# Patient Record
Sex: FEMALE | Race: WHITE | Employment: OTHER | URBAN - METROPOLITAN AREA
[De-identification: names, ages, dates, MRNs, and addresses within clinical notes are randomized per-mention and may not be internally consistent; named-entity substitution may affect disease eponyms.]

---

## 2017-02-23 RX ORDER — TRAMADOL HYDROCHLORIDE 50 MG/1
TABLET ORAL
Qty: 180 TABLET | Refills: 1 | Status: SHIPPED | OUTPATIENT
Start: 2017-02-23 | End: 2017-02-27 | Stop reason: SDUPTHER

## 2017-02-27 RX ORDER — SIMVASTATIN 20 MG
20 TABLET ORAL NIGHTLY
Qty: 90 TABLET | Refills: 3 | Status: SHIPPED | OUTPATIENT
Start: 2017-02-27 | End: 2018-02-07 | Stop reason: SDUPTHER

## 2017-02-27 RX ORDER — TRAMADOL HYDROCHLORIDE 50 MG/1
50 TABLET ORAL 2 TIMES DAILY PRN
Qty: 180 TABLET | Refills: 1 | Status: SHIPPED | OUTPATIENT
Start: 2017-02-27 | End: 2017-03-06 | Stop reason: SDUPTHER

## 2017-03-06 RX ORDER — TRAMADOL HYDROCHLORIDE 50 MG/1
50 TABLET ORAL 2 TIMES DAILY PRN
Qty: 180 TABLET | Refills: 1 | Status: SHIPPED | OUTPATIENT
Start: 2017-03-06 | End: 2017-03-08 | Stop reason: SDUPTHER

## 2017-03-08 ENCOUNTER — OFFICE VISIT (OUTPATIENT)
Dept: INTERNAL MEDICINE CLINIC | Age: 72
End: 2017-03-08

## 2017-03-08 VITALS
HEIGHT: 62 IN | RESPIRATION RATE: 16 BRPM | DIASTOLIC BLOOD PRESSURE: 70 MMHG | OXYGEN SATURATION: 98 % | SYSTOLIC BLOOD PRESSURE: 132 MMHG | HEART RATE: 93 BPM | WEIGHT: 145 LBS | BODY MASS INDEX: 26.68 KG/M2

## 2017-03-08 DIAGNOSIS — I34.0 MODERATE MITRAL REGURGITATION: ICD-10-CM

## 2017-03-08 DIAGNOSIS — I10 ESSENTIAL HYPERTENSION: Primary | ICD-10-CM

## 2017-03-08 DIAGNOSIS — Z79.899 HIGH RISK MEDICATION USE: ICD-10-CM

## 2017-03-08 DIAGNOSIS — J45.30 MILD PERSISTENT ASTHMA WITHOUT COMPLICATION: ICD-10-CM

## 2017-03-08 DIAGNOSIS — E78.2 MIXED HYPERLIPIDEMIA: ICD-10-CM

## 2017-03-08 DIAGNOSIS — G25.81 RLS (RESTLESS LEGS SYNDROME): ICD-10-CM

## 2017-03-08 DIAGNOSIS — K21.9 GASTROESOPHAGEAL REFLUX DISEASE, ESOPHAGITIS PRESENCE NOT SPECIFIED: ICD-10-CM

## 2017-03-08 PROCEDURE — 80305 DRUG TEST PRSMV DIR OPT OBS: CPT | Performed by: INTERNAL MEDICINE

## 2017-03-08 PROCEDURE — 99213 OFFICE O/P EST LOW 20 MIN: CPT | Performed by: INTERNAL MEDICINE

## 2017-03-08 RX ORDER — TRAMADOL HYDROCHLORIDE 50 MG/1
50 TABLET ORAL 2 TIMES DAILY PRN
Qty: 180 TABLET | Refills: 1 | Status: SHIPPED | OUTPATIENT
Start: 2017-03-08 | End: 2017-07-12 | Stop reason: SDUPTHER

## 2017-03-08 ASSESSMENT — ENCOUNTER SYMPTOMS
HOARSE VOICE: 0
SPUTUM PRODUCTION: 0
HEMOPTYSIS: 0
BELCHING: 0
HEARTBURN: 0
CHEST TIGHTNESS: 0
BLURRED VISION: 0
DIFFICULTY BREATHING: 0
GLOBUS SENSATION: 0
NAUSEA: 0
ORTHOPNEA: 0
SHORTNESS OF BREATH: 1
ABDOMINAL PAIN: 0
FREQUENT THROAT CLEARING: 0
WHEEZING: 0
COUGH: 0
SORE THROAT: 0
CHOKING: 0

## 2017-03-10 ENCOUNTER — HOSPITAL ENCOUNTER (OUTPATIENT)
Dept: NON INVASIVE DIAGNOSTICS | Age: 72
Discharge: HOME | End: 2017-03-10
Attending: INTERNAL MEDICINE | Admitting: INTERNAL MEDICINE

## 2017-03-10 DIAGNOSIS — I34.0 NONRHEUMATIC MITRAL VALVE INSUFFICIENCY: ICD-10-CM

## 2017-03-10 LAB
LV EF: 60 %
LVEF MODALITY: NORMAL

## 2017-03-12 LAB
6-ACETYLMORPHINE: NOT DETECTED
7-AMINOCLONAZEPAM: NOT DETECTED
ALPHA-OH-ALPRAZOLAM: NOT DETECTED
ALPRAZOLAM: NOT DETECTED
AMPHETAMINE: NOT DETECTED
BARBITURATES: NOT DETECTED
BENZOYLECGONINE: NOT DETECTED
BUPRENORPHINE: NOT DETECTED
CARISOPRODOL: NOT DETECTED
CLONAZEPAM: NOT DETECTED
CODEINE: NOT DETECTED
CREATININE URINE: 84.5 MG/DL (ref 20–400)
DIAZEPAM: NOT DETECTED
DRUGS EXPECTED: NORMAL
EER PAIN MGT DRUG PANEL, HIGH RES/EMIT U: NORMAL
ETHYL GLUCURONIDE: NOT DETECTED
FENTANYL: NOT DETECTED
HYDROCODONE: NOT DETECTED
HYDROMORPHONE: NOT DETECTED
LORAZEPAM: NOT DETECTED
MARIJUANA METABOLITE: NOT DETECTED
MDA: NOT DETECTED
MDEA: NOT DETECTED
MDMA URINE: NOT DETECTED
MEPERIDINE: NOT DETECTED
METHADONE: NOT DETECTED
METHAMPHETAMINE: NOT DETECTED
METHYLPHENIDATE: NOT DETECTED
MIDAZOLAM: NOT DETECTED
MORPHINE: NOT DETECTED
NORBUPRENORPHINE, FREE: NOT DETECTED
NORDIAZEPAM: NOT DETECTED
NORFENTANYL: NOT DETECTED
NORHYDROCODONE, URINE: NOT DETECTED
NOROXYCODONE: NOT DETECTED
NOROXYMORPHONE, URINE: NOT DETECTED
OXAZEPAM: NOT DETECTED
OXYCODONE: NOT DETECTED
OXYMORPHONE: NOT DETECTED
PAIN MANAGEMENT DRUG PANEL: NORMAL
PAIN MANAGEMENT DRUG PANEL: NORMAL
PCP: NOT DETECTED
PHENTERMINE: NOT DETECTED
PROPOXYPHENE: NOT DETECTED
TAPENTADOL, URINE: NOT DETECTED
TAPENTADOL-O-SULFATE, URINE: NOT DETECTED
TEMAZEPAM: NOT DETECTED
TRAMADOL: PRESENT
ZOLPIDEM: NOT DETECTED

## 2017-06-22 ENCOUNTER — OFFICE VISIT (OUTPATIENT)
Dept: INTERNAL MEDICINE CLINIC | Age: 72
End: 2017-06-22

## 2017-06-22 VITALS
HEIGHT: 62 IN | OXYGEN SATURATION: 99 % | BODY MASS INDEX: 27.05 KG/M2 | SYSTOLIC BLOOD PRESSURE: 134 MMHG | RESPIRATION RATE: 16 BRPM | WEIGHT: 147 LBS | DIASTOLIC BLOOD PRESSURE: 70 MMHG | HEART RATE: 100 BPM

## 2017-06-22 DIAGNOSIS — I10 ESSENTIAL HYPERTENSION: Primary | ICD-10-CM

## 2017-06-22 DIAGNOSIS — E78.2 MIXED HYPERLIPIDEMIA: ICD-10-CM

## 2017-06-22 DIAGNOSIS — K21.9 GASTROESOPHAGEAL REFLUX DISEASE, ESOPHAGITIS PRESENCE NOT SPECIFIED: ICD-10-CM

## 2017-06-22 DIAGNOSIS — G25.81 RLS (RESTLESS LEGS SYNDROME): ICD-10-CM

## 2017-06-22 DIAGNOSIS — J45.30 MILD PERSISTENT ASTHMA WITHOUT COMPLICATION: ICD-10-CM

## 2017-06-22 PROCEDURE — 99213 OFFICE O/P EST LOW 20 MIN: CPT | Performed by: INTERNAL MEDICINE

## 2017-06-22 RX ORDER — MEPOLIZUMAB 100 MG/ML
INJECTION, POWDER, FOR SOLUTION SUBCUTANEOUS
COMMUNITY
Start: 2017-06-13 | End: 2018-04-04

## 2017-06-22 RX ORDER — PREDNISONE 1 MG/1
5 TABLET ORAL EVERY OTHER DAY
COMMUNITY
End: 2018-09-25

## 2017-06-22 ASSESSMENT — ENCOUNTER SYMPTOMS
WHEEZING: 0
FREQUENT THROAT CLEARING: 0
CHEST TIGHTNESS: 0
BLURRED VISION: 0
ORTHOPNEA: 0
GLOBUS SENSATION: 0
SPUTUM PRODUCTION: 0
HEMOPTYSIS: 0
NAUSEA: 0
COUGH: 0
SORE THROAT: 0
HOARSE VOICE: 0
HEARTBURN: 0
CHOKING: 0
ABDOMINAL PAIN: 0
DIFFICULTY BREATHING: 0
BELCHING: 0
SHORTNESS OF BREATH: 0

## 2017-06-23 ENCOUNTER — NURSE ONLY (OUTPATIENT)
Dept: INTERNAL MEDICINE CLINIC | Age: 72
End: 2017-06-23

## 2017-06-23 DIAGNOSIS — E78.2 MIXED HYPERLIPIDEMIA: ICD-10-CM

## 2017-06-23 DIAGNOSIS — I10 ESSENTIAL HYPERTENSION: ICD-10-CM

## 2017-06-23 LAB
A/G RATIO: 1.9 (ref 1.1–2.2)
ALBUMIN SERPL-MCNC: 3.8 G/DL (ref 3.4–5)
ALP BLD-CCNC: 66 U/L (ref 40–129)
ALT SERPL-CCNC: 25 U/L (ref 10–40)
ANION GAP SERPL CALCULATED.3IONS-SCNC: 16 MMOL/L (ref 3–16)
AST SERPL-CCNC: 31 U/L (ref 15–37)
BILIRUB SERPL-MCNC: <0.2 MG/DL (ref 0–1)
BUN BLDV-MCNC: 10 MG/DL (ref 7–20)
CALCIUM SERPL-MCNC: 9.1 MG/DL (ref 8.3–10.6)
CHLORIDE BLD-SCNC: 103 MMOL/L (ref 99–110)
CO2: 25 MMOL/L (ref 21–32)
CREAT SERPL-MCNC: 0.7 MG/DL (ref 0.6–1.2)
GFR AFRICAN AMERICAN: >60
GFR NON-AFRICAN AMERICAN: >60
GLOBULIN: 2 G/DL
GLUCOSE BLD-MCNC: 85 MG/DL (ref 70–99)
LDL CHOLESTEROL DIRECT: 94 MG/DL
POTASSIUM SERPL-SCNC: 4.2 MMOL/L (ref 3.5–5.1)
SODIUM BLD-SCNC: 144 MMOL/L (ref 136–145)
TOTAL PROTEIN: 5.8 G/DL (ref 6.4–8.2)

## 2017-06-23 PROCEDURE — 36415 COLL VENOUS BLD VENIPUNCTURE: CPT | Performed by: INTERNAL MEDICINE

## 2017-07-12 DIAGNOSIS — Z79.899 HIGH RISK MEDICATION USE: ICD-10-CM

## 2017-07-12 DIAGNOSIS — G25.81 RLS (RESTLESS LEGS SYNDROME): ICD-10-CM

## 2017-07-13 RX ORDER — TRAMADOL HYDROCHLORIDE 50 MG/1
TABLET ORAL
Qty: 180 TABLET | Refills: 1 | Status: SHIPPED | OUTPATIENT
Start: 2017-07-13 | End: 2017-07-31 | Stop reason: SDUPTHER

## 2017-07-31 DIAGNOSIS — G25.81 RLS (RESTLESS LEGS SYNDROME): ICD-10-CM

## 2017-07-31 DIAGNOSIS — Z79.899 HIGH RISK MEDICATION USE: ICD-10-CM

## 2017-07-31 RX ORDER — LANSOPRAZOLE 30 MG/1
CAPSULE, DELAYED RELEASE ORAL
Qty: 90 CAPSULE | Refills: 3 | Status: SHIPPED | OUTPATIENT
Start: 2017-07-31 | End: 2018-07-25 | Stop reason: SDUPTHER

## 2017-07-31 RX ORDER — LANSOPRAZOLE 30 MG/1
CAPSULE, DELAYED RELEASE ORAL
Qty: 90 CAPSULE | Refills: 3 | Status: SHIPPED | OUTPATIENT
Start: 2017-07-31 | End: 2017-07-31 | Stop reason: SDUPTHER

## 2017-07-31 RX ORDER — TRAMADOL HYDROCHLORIDE 50 MG/1
TABLET ORAL
Qty: 180 TABLET | Refills: 0 | Status: SHIPPED | OUTPATIENT
Start: 2017-07-31 | End: 2017-10-10 | Stop reason: SDUPTHER

## 2017-10-02 ENCOUNTER — OFFICE VISIT (OUTPATIENT)
Dept: INTERNAL MEDICINE CLINIC | Age: 72
End: 2017-10-02

## 2017-10-02 VITALS
HEIGHT: 62 IN | RESPIRATION RATE: 16 BRPM | OXYGEN SATURATION: 98 % | HEART RATE: 98 BPM | BODY MASS INDEX: 26.68 KG/M2 | SYSTOLIC BLOOD PRESSURE: 126 MMHG | WEIGHT: 145 LBS | DIASTOLIC BLOOD PRESSURE: 70 MMHG

## 2017-10-02 DIAGNOSIS — E78.2 MIXED HYPERLIPIDEMIA: ICD-10-CM

## 2017-10-02 DIAGNOSIS — I10 ESSENTIAL HYPERTENSION: Primary | ICD-10-CM

## 2017-10-02 DIAGNOSIS — G25.81 RLS (RESTLESS LEGS SYNDROME): ICD-10-CM

## 2017-10-02 DIAGNOSIS — K21.9 GASTROESOPHAGEAL REFLUX DISEASE, ESOPHAGITIS PRESENCE NOT SPECIFIED: ICD-10-CM

## 2017-10-02 DIAGNOSIS — J45.30 MILD PERSISTENT ASTHMA WITHOUT COMPLICATION: ICD-10-CM

## 2017-10-02 PROCEDURE — 99213 OFFICE O/P EST LOW 20 MIN: CPT | Performed by: INTERNAL MEDICINE

## 2017-10-02 ASSESSMENT — PATIENT HEALTH QUESTIONNAIRE - PHQ9
SUM OF ALL RESPONSES TO PHQ QUESTIONS 1-9: 0
2. FEELING DOWN, DEPRESSED OR HOPELESS: 0
SUM OF ALL RESPONSES TO PHQ9 QUESTIONS 1 & 2: 0
1. LITTLE INTEREST OR PLEASURE IN DOING THINGS: 0

## 2017-10-02 ASSESSMENT — ENCOUNTER SYMPTOMS
BELCHING: 0
HEMOPTYSIS: 0
NAUSEA: 0
HEARTBURN: 0
SPUTUM PRODUCTION: 0
HOARSE VOICE: 0
BLURRED VISION: 0
SHORTNESS OF BREATH: 0
FREQUENT THROAT CLEARING: 0
ABDOMINAL PAIN: 0
SORE THROAT: 0
COUGH: 0
WHEEZING: 0
GLOBUS SENSATION: 0
DIFFICULTY BREATHING: 0
CHOKING: 0
CHEST TIGHTNESS: 0
ORTHOPNEA: 0

## 2017-10-02 NOTE — PROGRESS NOTES
Subjective:    Here for recheck of htn, hld,gerd, asthma and rls. Patient ID: Manuela Henriquez is a 67 y.o. female. Hypertension   This is a chronic problem. The current episode started more than 1 year ago. The problem is unchanged. The problem is controlled. Associated symptoms include malaise/fatigue. Pertinent negatives include no blurred vision, chest pain, headaches, neck pain, orthopnea, palpitations, peripheral edema, PND, shortness of breath or sweats. (. ) There are no associated agents to hypertension. Risk factors for coronary artery disease include dyslipidemia and post-menopausal state. Past treatments include ACE inhibitors and calcium channel blockers. The current treatment provides significant improvement. There are no compliance problems. Hyperlipidemia   This is a chronic problem. The current episode started more than 1 year ago. The problem is controlled. Recent lipid tests were reviewed and are normal. There are no known factors aggravating her hyperlipidemia. Pertinent negatives include no chest pain, focal sensory loss, focal weakness, leg pain, myalgias or shortness of breath. Current antihyperlipidemic treatment includes statins. The current treatment provides significant improvement of lipids. There are no compliance problems. Risk factors for coronary artery disease include dyslipidemia, hypertension and post-menopausal.   Gastroesophageal Reflux   She reports no abdominal pain, no belching, no chest pain, no choking, no coughing, no dysphagia, no early satiety, no globus sensation, no heartburn, no hoarse voice, no nausea, no sore throat or no wheezing. This is a chronic problem. The current episode started more than 1 year ago. The problem occurs rarely. The problem has been unchanged. Nothing aggravates the symptoms. Pertinent negatives include no anemia, fatigue, melena, muscle weakness, orthopnea or weight loss. There are no known risk factors.  She has tried a PPI for the symptoms. The treatment provided significant relief. Past procedures include an EGD. Asthma   There is no chest tightness, cough, difficulty breathing, frequent throat clearing, hemoptysis, hoarse voice, shortness of breath, sputum production or wheezing. This is a chronic problem. The current episode started more than 1 year ago. The problem occurs intermittently (greatly improved on spiriva). The problem has been rapidly improving (uring albuterol several times daily. 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 Nayeli Colmenares ). The cough is productive of sputum. Associated symptoms include malaise/fatigue. Pertinent negatives include no chest pain, fever, headaches, heartburn, myalgias, orthopnea, PND, sore throat, sweats or weight loss. Associated symptoms comments: Scant sputum production. . Her symptoms are aggravated by pollen and change in weather. Her symptoms are alleviated by beta-agonist, oral steroids, steroid inhaler and ipratropium (currently on prednisone taper. ). She reports complete improvement on treatment. Her past medical history is significant for asthma and pneumonia. Past medical history comments: Recent hospitalization for pneumonia. Seeing allergist. Recovered now. .     Using tramadol for rls. Completing adls. Good relief. Using 1.5-2 tab daily. Sleeping well. Recent echo with good ef.      Pt's fatigue is at base line       Review of Systems   Constitutional: Positive for malaise/fatigue. Negative for chills, fatigue, fever and weight loss. HENT: Negative for hoarse voice and sore throat. Eyes: Negative for blurred vision. Respiratory: Negative for cough, hemoptysis, sputum production, choking, shortness of breath and wheezing. Cardiovascular: Negative for chest pain, palpitations, orthopnea, leg swelling and PND. Gastrointestinal: Negative for abdominal pain, dysphagia, heartburn, melena and nausea.    Musculoskeletal: Negative for myalgias, muscle weakness and neck pain. Neurological: Negative for focal weakness, syncope, light-headedness and headaches. Prior to Visit Medications    Medication Sig Taking? Authorizing Provider   Tiotropium Bromide Monohydrate (SPIRIVA RESPIMAT IN) Inhale into the lungs Yes Historical Provider, MD   traMADol (ULTRAM) 50 MG tablet TAKE 1 TABLET TWICE DAILY AS NEEDED  FOR  PAIN Yes Heidy Murray MD   lansoprazole (PREVACID) 30 MG delayed release capsule TAKE 1 CAPSULE EVERY DAY Yes Heidy Murray MD   NUCALA 100 MG SOLR injection  Yes Historical Provider, MD   predniSONE (DELTASONE) 5 MG tablet Take 5 mg by mouth daily Yes Historical Provider, MD   simvastatin (ZOCOR) 20 MG tablet Take 1 tablet by mouth nightly Yes Heidy Murray MD   alendronate (FOSAMAX) 70 MG tablet Take 1 tablet by mouth every 7 days Yes Heidy Murray MD   ibuprofen (ADVIL;MOTRIN) 600 MG tablet Take 1 tablet by mouth every 6 hours as needed for Pain Yes Heidy Murray MD   alclomethasone (ACLOVATE) 0.05 % cream  Yes Historical Provider, MD   amLODIPine (NORVASC) 5 MG tablet TAKE 1 TABLET EVERY DAY Yes Heidy Murray MD   cromolyn (INTAL) 20 MG/2ML nebulizer solution  Yes Historical Provider, MD   beclomethasone (QVAR) 40 MCG/ACT inhaler 1 nasal spray each side bid Yes Heidy Murray MD   ALVESCO 160 MCG/ACT AERS  Yes Historical Provider, MD   montelukast (SINGULAIR) 10 MG tablet Take 1 tablet by mouth nightly. Yes Heidy Murray MD   azelastine (ASTELIN) 137 MCG/SPRAY nasal spray 2 sprays by Nasal route daily. Use in each nostril as directed Yes Heidy Murray MD   fexofenadine (ALLEGRA) 180 MG tablet Take 1 tablet by mouth daily. Yes Heidy Murray MD   albuterol (PROVENTIL HFA;VENTOLIN HFA) 108 (90 BASE) MCG/ACT inhaler Inhale 2 puffs into the lungs. AS NEEDED. Yes Historical Provider, MD   Multiple Vitamins-Minerals (CENTRUM SILVER) TABS Take  by mouth.    Yes Historical Provider, MD   [facility] [id number] BI-FLEX ADV TRIPLE ST) TABS Take 2 tablets by mouth daily. Yes Historical Provider, MD   Calcium Carbonate-Vit D-Min (CALTRATE 600+D PLUS) 600-400 MG-UNIT TABS Take 2 tablets by mouth daily. Yes Historical Provider, MD         /70 (Site: Right Arm, Position: Sitting, Cuff Size: Medium Adult)  Pulse 98  Resp 16  Ht 5' 2\" (1.575 m)  Wt 145 lb (65.8 kg)  SpO2 98%  BMI 26.52 kg/m2    Objective:   Physical Exam   Constitutional: She appears well-developed and well-nourished. No distress. Neck: No JVD present. No thyromegaly present. No bruits   Cardiovascular: Normal rate and regular rhythm. Exam reveals no gallop and no friction rub. Murmur heard. Systolic murmur is present with a grade of 2/6   Pulmonary/Chest: Effort normal. No respiratory distress. She has no wheezes. She has no rhonchi. She has no rales. Abdominal: Soft. Bowel sounds are normal. She exhibits no distension. There is no tenderness. Musculoskeletal: Normal range of motion. She exhibits no edema. Skin: Skin is warm and dry. She is not diaphoretic. Vitals reviewed. Assessment:      1. Essential hypertension    2. Mixed hyperlipidemia    3. Gastroesophageal reflux disease, esophagitis presence not specified    4. Mild persistent asthma without complication    5. RLS (restless legs syndrome)              Plan:          Yvonne was seen today for hypertension. Diagnoses and all orders for this visit:    Essential hypertension:  Stable, cont same meds    Mixed hyperlipidemia:  Stable, cont same meds    Gastroesophageal reflux disease, esophagitis presence not specified:  Stable, cont same meds    Mild persistent asthma without complication:  Stable, cont same meds    RLS (restless legs syndrome): Stable, cont same meds        Controlled Substances Monitoring: Documentation: No signs of potential drug abuse or diversion identified., Obtaining appropriate analgesic effect of treatment.  (Christy Fong MD)      3

## 2017-10-02 NOTE — MR AVS SNAPSHOT
your BMI by decreasing your calorie intake and becoming more physically active. Learn more at: [email or web address]             Today's Medication Changes          These changes are accurate as of: 10/2/17 12:47 PM.  If you have any questions, ask your nurse or doctor. STOP taking these medications           TUDORZA PRESSAIR 400 MCG/ACT Aepb inhaler   Generic drug:  aclidinium   Stopped by:  Cee Power MD               Your Current Medications Are              Tiotropium Bromide Monohydrate (SPIRIVA RESPIMAT IN) Inhale into the lungs    traMADol (ULTRAM) 50 MG tablet TAKE 1 TABLET TWICE DAILY AS NEEDED  FOR  PAIN    lansoprazole (PREVACID) 30 MG delayed release capsule TAKE 1 CAPSULE EVERY DAY    NUCALA 100 MG SOLR injection     predniSONE (DELTASONE) 5 MG tablet Take 5 mg by mouth daily    simvastatin (ZOCOR) 20 MG tablet Take 1 tablet by mouth nightly    alendronate (FOSAMAX) 70 MG tablet Take 1 tablet by mouth every 7 days    ibuprofen (ADVIL;MOTRIN) 600 MG tablet Take 1 tablet by mouth every 6 hours as needed for Pain    alclomethasone (ACLOVATE) 0.05 % cream     amLODIPine (NORVASC) 5 MG tablet TAKE 1 TABLET EVERY DAY    cromolyn (INTAL) 20 MG/2ML nebulizer solution     beclomethasone (QVAR) 40 MCG/ACT inhaler 1 nasal spray each side bid    ALVESCO 160 MCG/ACT AERS     montelukast (SINGULAIR) 10 MG tablet Take 1 tablet by mouth nightly. azelastine (ASTELIN) 137 MCG/SPRAY nasal spray 2 sprays by Nasal route daily. Use in each nostril as directed    fexofenadine (ALLEGRA) 180 MG tablet Take 1 tablet by mouth daily. albuterol (PROVENTIL HFA;VENTOLIN HFA) 108 (90 BASE) MCG/ACT inhaler Inhale 2 puffs into the lungs. AS NEEDED. Multiple Vitamins-Minerals (CENTRUM SILVER) TABS Take  by mouth. Misc Natural Products (OSTEO BI-FLEX ADV TRIPLE ST) TABS Take 2 tablets by mouth daily. Calcium Carbonate-Vit D-Min (CALTRATE 600+D PLUS) 600-400 MG-UNIT TABS Take 2 tablets by mouth daily. Allergies              Cinnamon     Eggs [Egg White]     Mustard [Allyl Isothiocyanate]     Naprosyn [Naproxen]     Other     TREE NUTS, PEANUTS    Pcn [Penicillins]     Red Dye     rash    Tylenol [Acetaminophen]          Additional Information        Basic Information     Date Of Birth Sex Race Ethnicity Preferred Language    1945 Female White Non-/Non  English      Problem List as of 10/2/2017  Date Reviewed: 10/2/2017                Moderate mitral regurgitation    Mild tricuspid regurgitation    Hyperlipidemia    Essential hypertension    Gastroesophageal reflux disease    Mild persistent asthma without complication    Seasonal allergies    Arthritis    Encounter for long-term (current) use of medications    Abdominal pain    Allergic rhinitis    RLS (restless legs syndrome)    Osteopenia      Your Goals as of 10/2/2017 at 12:47 PM                 Lifestyle    Will take medications as prescribed by Md 8/29/16     Notes    Self- Management Goals for the Patient with Hypertension: It is important to have goals to work towards when you have Hypertension. Below is a list of goals your doctor would like you to work towards to help control your hypertension and also maintain and improve your overall health. Please select one of these goals to try before your next follow up visit:    Goal: I will take all medications as prescribed by my doctor, and I will call the office if I am having any medication problems. Guess your barriers before they happen. Everyone runs into barriers to their goals. You may already know what's going to get in your way. Write down these problems (cost? time? stress? fear?), and think of ways to get around them.      Barriers to success: none  Plan for overcoming my barriers: N/A     Confidence: 10/10  Date goal set: 8/29/16  Date goal attained: Immunizations as of 10/2/2017     Name Date    Pneumococcal 13-valent Conjugate ([id number]) 3/2/2015    Pneumococcal Conjugate 7-valent 10/20/2005    Pneumococcal Polysaccharide ([id number]) 5/26/2016    Zoster 10/10/2012      Preventive Care        Date Due    Hepatitis C screening is recommended for all adults regardless of risk factors born [facility] at least once (lifetime) who have never been tested. 1945    Tetanus Combination Vaccine (1 - Tdap) 7/4/1964    Yearly Flu Vaccine (1) 9/1/2017    Mammograms are recommended every 2 years for low/average risk patients aged 48 - 69, and every year for high risk patients per updated national guidelines. However these guidelines can be individualized by your provider. 3/11/2018    Cholesterol Screening 6/23/2022    Colonoscopy 6/13/2023            aCont Signup           Our records indicate that you have an active [employer] account. You can view your After Visit Summary by going to [email or web address] and logging in with your [employer] username and password. If you don't have a [employer] username and password but a parent or guardian has access to your record, the parent or guardian should login with their own [employer] username and password and access your record to view the After Visit Summary. Additional Information  If you have questions, please contact the physician practice where you receive care. Remember, [employer] is NOT to be used for urgent needs. For medical emergencies, dial 911. For questions regarding your [employer] account call [phone]. If you have a clinical question, please call your doctor's office.

## 2017-10-10 DIAGNOSIS — G25.81 RLS (RESTLESS LEGS SYNDROME): ICD-10-CM

## 2017-10-10 DIAGNOSIS — Z79.899 HIGH RISK MEDICATION USE: ICD-10-CM

## 2017-10-10 RX ORDER — TRAMADOL HYDROCHLORIDE 50 MG/1
TABLET ORAL
Qty: 180 TABLET | Refills: 1 | OUTPATIENT
Start: 2017-10-10 | End: 2018-02-27 | Stop reason: SDUPTHER

## 2017-12-04 RX ORDER — AMLODIPINE BESYLATE 5 MG/1
TABLET ORAL
Qty: 90 TABLET | Refills: 3 | Status: SHIPPED | OUTPATIENT
Start: 2017-12-04 | End: 2018-04-04 | Stop reason: SDUPTHER

## 2018-01-03 ENCOUNTER — OFFICE VISIT (OUTPATIENT)
Dept: INTERNAL MEDICINE CLINIC | Age: 73
End: 2018-01-03

## 2018-01-03 VITALS
BODY MASS INDEX: 25.58 KG/M2 | WEIGHT: 139 LBS | RESPIRATION RATE: 16 BRPM | SYSTOLIC BLOOD PRESSURE: 134 MMHG | DIASTOLIC BLOOD PRESSURE: 68 MMHG | HEIGHT: 62 IN | HEART RATE: 78 BPM | OXYGEN SATURATION: 99 %

## 2018-01-03 DIAGNOSIS — E78.2 MIXED HYPERLIPIDEMIA: ICD-10-CM

## 2018-01-03 DIAGNOSIS — K21.9 GASTROESOPHAGEAL REFLUX DISEASE, ESOPHAGITIS PRESENCE NOT SPECIFIED: ICD-10-CM

## 2018-01-03 DIAGNOSIS — G25.81 RLS (RESTLESS LEGS SYNDROME): ICD-10-CM

## 2018-01-03 DIAGNOSIS — J45.30 MILD PERSISTENT ASTHMA WITHOUT COMPLICATION: ICD-10-CM

## 2018-01-03 DIAGNOSIS — I34.0 MODERATE MITRAL REGURGITATION: ICD-10-CM

## 2018-01-03 DIAGNOSIS — R47.1 DYSARTHRIA: ICD-10-CM

## 2018-01-03 DIAGNOSIS — I10 ESSENTIAL HYPERTENSION: Primary | ICD-10-CM

## 2018-01-03 LAB
A/G RATIO: 2 (ref 1.1–2.2)
ALBUMIN SERPL-MCNC: 4.2 G/DL (ref 3.4–5)
ALP BLD-CCNC: 55 U/L (ref 40–129)
ALT SERPL-CCNC: 29 U/L (ref 10–40)
ANION GAP SERPL CALCULATED.3IONS-SCNC: 14 MMOL/L (ref 3–16)
AST SERPL-CCNC: 24 U/L (ref 15–37)
BILIRUB SERPL-MCNC: 0.4 MG/DL (ref 0–1)
BUN BLDV-MCNC: 10 MG/DL (ref 7–20)
CALCIUM SERPL-MCNC: 9.7 MG/DL (ref 8.3–10.6)
CHLORIDE BLD-SCNC: 105 MMOL/L (ref 99–110)
CHOLESTEROL, TOTAL: 182 MG/DL (ref 0–199)
CO2: 27 MMOL/L (ref 21–32)
CREAT SERPL-MCNC: 0.5 MG/DL (ref 0.6–1.2)
GFR AFRICAN AMERICAN: >60
GFR NON-AFRICAN AMERICAN: >60
GLOBULIN: 2.1 G/DL
GLUCOSE BLD-MCNC: 81 MG/DL (ref 70–99)
HDLC SERPL-MCNC: 48 MG/DL (ref 40–60)
LDL CHOLESTEROL CALCULATED: 83 MG/DL
POTASSIUM SERPL-SCNC: 4.4 MMOL/L (ref 3.5–5.1)
SODIUM BLD-SCNC: 146 MMOL/L (ref 136–145)
TOTAL PROTEIN: 6.3 G/DL (ref 6.4–8.2)
TRIGL SERPL-MCNC: 256 MG/DL (ref 0–150)
VLDLC SERPL CALC-MCNC: 51 MG/DL

## 2018-01-03 PROCEDURE — G8419 CALC BMI OUT NRM PARAM NOF/U: HCPCS | Performed by: INTERNAL MEDICINE

## 2018-01-03 PROCEDURE — 4040F PNEUMOC VAC/ADMIN/RCVD: CPT | Performed by: INTERNAL MEDICINE

## 2018-01-03 PROCEDURE — 99214 OFFICE O/P EST MOD 30 MIN: CPT | Performed by: INTERNAL MEDICINE

## 2018-01-03 PROCEDURE — G8427 DOCREV CUR MEDS BY ELIG CLIN: HCPCS | Performed by: INTERNAL MEDICINE

## 2018-01-03 PROCEDURE — G8484 FLU IMMUNIZE NO ADMIN: HCPCS | Performed by: INTERNAL MEDICINE

## 2018-01-03 PROCEDURE — 3017F COLORECTAL CA SCREEN DOC REV: CPT | Performed by: INTERNAL MEDICINE

## 2018-01-03 PROCEDURE — 36415 COLL VENOUS BLD VENIPUNCTURE: CPT | Performed by: INTERNAL MEDICINE

## 2018-01-03 PROCEDURE — 3014F SCREEN MAMMO DOC REV: CPT | Performed by: INTERNAL MEDICINE

## 2018-01-03 PROCEDURE — 1090F PRES/ABSN URINE INCON ASSESS: CPT | Performed by: INTERNAL MEDICINE

## 2018-01-03 PROCEDURE — 1036F TOBACCO NON-USER: CPT | Performed by: INTERNAL MEDICINE

## 2018-01-03 PROCEDURE — 1123F ACP DISCUSS/DSCN MKR DOCD: CPT | Performed by: INTERNAL MEDICINE

## 2018-01-03 PROCEDURE — G8399 PT W/DXA RESULTS DOCUMENT: HCPCS | Performed by: INTERNAL MEDICINE

## 2018-01-03 ASSESSMENT — ENCOUNTER SYMPTOMS
CHOKING: 0
SPUTUM PRODUCTION: 0
WHEEZING: 0
FREQUENT THROAT CLEARING: 0
DIFFICULTY BREATHING: 0
HOARSE VOICE: 0
GLOBUS SENSATION: 0
SHORTNESS OF BREATH: 0
CHEST TIGHTNESS: 0
NAUSEA: 0
ABDOMINAL PAIN: 0
BLURRED VISION: 0
HEMOPTYSIS: 0
COUGH: 0
SORE THROAT: 0
BELCHING: 0
HEARTBURN: 0
ORTHOPNEA: 0

## 2018-01-03 NOTE — PROGRESS NOTES
orthopnea, leg swelling and PND. Gastrointestinal: Negative for abdominal pain, dysphagia, heartburn, melena and nausea. Musculoskeletal: Negative for myalgias, muscle weakness and neck pain. Neurological: Positive for speech difficulty. Negative for focal weakness, syncope, light-headedness and headaches. Prior to Visit Medications    Medication Sig Taking? Authorizing Provider   amLODIPine (NORVASC) 5 MG tablet TAKE 1 TABLET EVERY DAY Yes Burton Dalal MD   traMADol (ULTRAM) 50 MG tablet TAKE 1 TABLET TWICE DAILY AS NEEDED  FOR  PAIN Yes Burton Dalal MD   Tiotropium Bromide Monohydrate (SPIRIVA RESPIMAT IN) Inhale into the lungs Yes Historical Provider, MD   lansoprazole (PREVACID) 30 MG delayed release capsule TAKE 1 CAPSULE EVERY DAY Yes Burton Dalal MD   NUCALA 100 MG SOLR injection  Yes Historical Provider, MD   predniSONE (DELTASONE) 5 MG tablet Take 5 mg by mouth every other day  Yes Historical Provider, MD   simvastatin (ZOCOR) 20 MG tablet Take 1 tablet by mouth nightly Yes Burton Dalal MD   alendronate (FOSAMAX) 70 MG tablet Take 1 tablet by mouth every 7 days Yes Burton Dalal MD   ibuprofen (ADVIL;MOTRIN) 600 MG tablet Take 1 tablet by mouth every 6 hours as needed for Pain Yes Burton Dalal MD   alclomethasone (ACLOVATE) 0.05 % cream  Yes Historical Provider, MD   cromolyn (INTAL) 20 MG/2ML nebulizer solution  Yes Historical Provider, MD   beclomethasone (QVAR) 40 MCG/ACT inhaler 1 nasal spray each side bid Yes Burton aDlal MD   ALVESCO 160 MCG/ACT AERS  Yes Historical Provider, MD   montelukast (SINGULAIR) 10 MG tablet Take 1 tablet by mouth nightly. Yes Burton Dalal MD   azelastine (ASTELIN) 137 MCG/SPRAY nasal spray 2 sprays by Nasal route daily. Use in each nostril as directed Yes Burton Dalal MD   fexofenadine (ALLEGRA) 180 MG tablet Take 1 tablet by mouth daily.  Yes Burton Dalal MD   albuterol (PROVENTIL HFA;VENTOLIN HFA) 108 (90 BASE) MCG/ACT inhaler Inhale 2 puffs into the lungs. AS NEEDED. Yes Historical Provider, MD   Multiple Vitamins-Minerals (CENTRUM SILVER) TABS Take  by mouth. Yes Historical Provider, MD   Misc Natural Products (OSTEO BI-FLEX ADV TRIPLE ST) TABS Take 2 tablets by mouth daily. Yes Historical Provider, MD   Calcium Carbonate-Vit D-Min (CALTRATE 600+D PLUS) 600-400 MG-UNIT TABS Take 2 tablets by mouth daily. Yes Historical Provider, MD         /68 (Site: Left Arm, Position: Sitting, Cuff Size: Medium Adult)   Pulse 78   Resp 16   Ht 5' 2\" (1.575 m)   Wt 139 lb (63 kg)   SpO2 99%   BMI 25.42 kg/m²     Objective:   Physical Exam   Constitutional: She is oriented to person, place, and time. She appears well-developed and well-nourished. No distress. Neck: No JVD present. No thyromegaly present. + soft left bruit   Cardiovascular: Normal rate and regular rhythm. Exam reveals no gallop and no friction rub. Murmur heard. Systolic murmur is present with a grade of 2/6   Pulmonary/Chest: Effort normal. No respiratory distress. She has no wheezes. She has no rhonchi. She has no rales. Abdominal: Soft. Bowel sounds are normal. She exhibits no distension. There is no tenderness. Musculoskeletal: Normal range of motion. She exhibits no edema. Neurological: She is alert and oriented to person, place, and time. She has normal reflexes. No cranial nerve deficit. Skin: Skin is warm and dry. She is not diaphoretic. Vitals reviewed. Assessment:      1. Essential hypertension    2. Mixed hyperlipidemia    3. Gastroesophageal reflux disease, esophagitis presence not specified    4. Mild persistent asthma without complication    5. RLS (restless legs syndrome)    6. Dysarthria    7. Moderate mitral regurgitation              Plan:          Yvonne was seen today for hypertension.     Diagnoses and all orders for this visit:    Essential hypertension:  Stable, cont same meds  - Comprehensive Metabolic Panel; Future    Mixed hyperlipidemia:  Stable, cont same meds  -     Comprehensive Metabolic Panel; Future  -     Lipid Panel; Future    Gastroesophageal reflux disease, esophagitis presence not specified:  Stable, cont same meds  -     Comprehensive Metabolic Panel; Future    Mild persistent asthma without complication:  Stable, cont same meds    RLS (restless legs syndrome): Stable, cont same meds    Dysarthria:  Check echo and carotids, cont asa  -     Ultrasound carotid doppler; Future  -     ECHO Complete 2D W Doppler W Color; Future    Moderate mitral regurgitation  -     ECHO Complete 2D W Doppler W Color; Future      Controlled Substances Monitoring:     Attestation: The Prescription Monitoring Report for this patient was reviewed today. (Joel Ibarra MD)  Documentation: No signs of potential drug abuse or diversion identified. , Random urine drug screen sent today., Obtaining appropriate analgesic effect of treatment. (Joel Ibarra MD)  Chronic Pain: Dose reduction has been attempted., Functional status reviewed - continues with improved or maintaining ADL's. (Joel Ibarra MD)  Medication Contracts: Medication contract signed today.  (Joel Ibarra MD)      3 months

## 2018-01-07 LAB
6-ACETYLMORPHINE: NOT DETECTED
7-AMINOCLONAZEPAM: NOT DETECTED
ALPHA-OH-ALPRAZOLAM: NOT DETECTED
ALPRAZOLAM: NOT DETECTED
AMPHETAMINE: NOT DETECTED
BARBITURATES: NOT DETECTED
BENZOYLECGONINE: NOT DETECTED
BUPRENORPHINE: NOT DETECTED
CARISOPRODOL: NOT DETECTED
CLONAZEPAM: NOT DETECTED
CODEINE: NOT DETECTED
CREATININE URINE: 48.3 MG/DL (ref 20–400)
DIAZEPAM: NOT DETECTED
DRUGS EXPECTED: NORMAL
EER PAIN MGT DRUG PANEL, HIGH RES/EMIT U: NORMAL
ETHYL GLUCURONIDE: NOT DETECTED
FENTANYL: NOT DETECTED
HYDROCODONE: NOT DETECTED
HYDROMORPHONE: NOT DETECTED
LORAZEPAM: NOT DETECTED
MARIJUANA METABOLITE: NOT DETECTED
MDA: NOT DETECTED
MDEA: NOT DETECTED
MDMA URINE: NOT DETECTED
MEPERIDINE: NOT DETECTED
METHADONE: NOT DETECTED
METHAMPHETAMINE: NOT DETECTED
METHYLPHENIDATE: NOT DETECTED
MIDAZOLAM: NOT DETECTED
MORPHINE: NOT DETECTED
NORBUPRENORPHINE, FREE: NOT DETECTED
NORDIAZEPAM: NOT DETECTED
NORFENTANYL: NOT DETECTED
NORHYDROCODONE, URINE: NOT DETECTED
NOROXYCODONE: NOT DETECTED
NOROXYMORPHONE, URINE: NOT DETECTED
OXAZEPAM: NOT DETECTED
OXYCODONE: NOT DETECTED
OXYMORPHONE: NOT DETECTED
PAIN MANAGEMENT DRUG PANEL: NORMAL
PAIN MANAGEMENT DRUG PANEL: NORMAL
PCP: NOT DETECTED
PHENTERMINE: NOT DETECTED
PROPOXYPHENE: NOT DETECTED
TAPENTADOL, URINE: NOT DETECTED
TAPENTADOL-O-SULFATE, URINE: NOT DETECTED
TEMAZEPAM: NOT DETECTED
TRAMADOL: PRESENT
ZOLPIDEM: NOT DETECTED

## 2018-01-12 ENCOUNTER — HOSPITAL ENCOUNTER (OUTPATIENT)
Dept: NON INVASIVE DIAGNOSTICS | Age: 73
Discharge: HOME | End: 2018-01-12
Attending: INTERNAL MEDICINE | Admitting: INTERNAL MEDICINE

## 2018-01-12 DIAGNOSIS — R47.1 DYSARTHRIA: ICD-10-CM

## 2018-01-12 DIAGNOSIS — R47.1 DYSARTHRIA AND ANARTHRIA: ICD-10-CM

## 2018-01-12 DIAGNOSIS — I34.0 MODERATE MITRAL REGURGITATION: ICD-10-CM

## 2018-01-12 LAB
LV EF: 70 %
LVEF MODALITY: NORMAL

## 2018-01-29 DIAGNOSIS — M25.552 LEFT HIP PAIN: ICD-10-CM

## 2018-01-29 RX ORDER — IBUPROFEN 600 MG/1
TABLET ORAL
Qty: 120 TABLET | Refills: 5 | Status: SHIPPED | OUTPATIENT
Start: 2018-01-29 | End: 2019-02-22 | Stop reason: SDUPTHER

## 2018-01-29 RX ORDER — ALENDRONATE SODIUM 70 MG/1
TABLET ORAL
Qty: 12 TABLET | Refills: 3 | Status: SHIPPED | OUTPATIENT
Start: 2018-01-29 | End: 2019-01-27 | Stop reason: SDUPTHER

## 2018-02-07 RX ORDER — SIMVASTATIN 20 MG
TABLET ORAL
Qty: 90 TABLET | Refills: 3 | Status: SHIPPED | OUTPATIENT
Start: 2018-02-07 | End: 2019-02-13 | Stop reason: SDUPTHER

## 2018-03-07 DIAGNOSIS — G25.81 RLS (RESTLESS LEGS SYNDROME): ICD-10-CM

## 2018-03-07 DIAGNOSIS — Z79.899 HIGH RISK MEDICATION USE: ICD-10-CM

## 2018-03-08 RX ORDER — TRAMADOL HYDROCHLORIDE 50 MG/1
TABLET ORAL
Qty: 180 TABLET | Refills: 0 | Status: SHIPPED | OUTPATIENT
Start: 2018-03-08 | End: 2018-03-15 | Stop reason: SDUPTHER

## 2018-03-08 NOTE — TELEPHONE ENCOUNTER
Controlled Substances Monitoring: The Prescription Monitoring Report for this patient was reviewed today. (Burton Dalal MD)    No signs of potential drug abuse or diversion identified. (Burton Dalal MD)         Functional status reviewed - continues with improved or maintaining ADL's. (Burton Dalal MD)    Medication contract signed today.  (Burton Dalal MD)

## 2018-04-04 ENCOUNTER — OFFICE VISIT (OUTPATIENT)
Dept: INTERNAL MEDICINE CLINIC | Age: 73
End: 2018-04-04

## 2018-04-04 VITALS
WEIGHT: 141 LBS | BODY MASS INDEX: 25.95 KG/M2 | DIASTOLIC BLOOD PRESSURE: 60 MMHG | HEART RATE: 96 BPM | TEMPERATURE: 97.8 F | SYSTOLIC BLOOD PRESSURE: 122 MMHG | HEIGHT: 62 IN | RESPIRATION RATE: 12 BRPM | OXYGEN SATURATION: 97 %

## 2018-04-04 DIAGNOSIS — E78.2 MIXED HYPERLIPIDEMIA: ICD-10-CM

## 2018-04-04 DIAGNOSIS — G25.81 RLS (RESTLESS LEGS SYNDROME): ICD-10-CM

## 2018-04-04 DIAGNOSIS — I10 ESSENTIAL HYPERTENSION: Primary | ICD-10-CM

## 2018-04-04 DIAGNOSIS — K21.9 GASTROESOPHAGEAL REFLUX DISEASE, ESOPHAGITIS PRESENCE NOT SPECIFIED: ICD-10-CM

## 2018-04-04 DIAGNOSIS — J45.30 MILD PERSISTENT ASTHMA WITHOUT COMPLICATION: ICD-10-CM

## 2018-04-04 DIAGNOSIS — I34.0 MODERATE MITRAL REGURGITATION: ICD-10-CM

## 2018-04-04 PROCEDURE — 3014F SCREEN MAMMO DOC REV: CPT | Performed by: INTERNAL MEDICINE

## 2018-04-04 PROCEDURE — G8419 CALC BMI OUT NRM PARAM NOF/U: HCPCS | Performed by: INTERNAL MEDICINE

## 2018-04-04 PROCEDURE — 3017F COLORECTAL CA SCREEN DOC REV: CPT | Performed by: INTERNAL MEDICINE

## 2018-04-04 PROCEDURE — 4040F PNEUMOC VAC/ADMIN/RCVD: CPT | Performed by: INTERNAL MEDICINE

## 2018-04-04 PROCEDURE — 1123F ACP DISCUSS/DSCN MKR DOCD: CPT | Performed by: INTERNAL MEDICINE

## 2018-04-04 PROCEDURE — G8427 DOCREV CUR MEDS BY ELIG CLIN: HCPCS | Performed by: INTERNAL MEDICINE

## 2018-04-04 PROCEDURE — 1090F PRES/ABSN URINE INCON ASSESS: CPT | Performed by: INTERNAL MEDICINE

## 2018-04-04 PROCEDURE — 1036F TOBACCO NON-USER: CPT | Performed by: INTERNAL MEDICINE

## 2018-04-04 PROCEDURE — 99213 OFFICE O/P EST LOW 20 MIN: CPT | Performed by: INTERNAL MEDICINE

## 2018-04-04 PROCEDURE — G8399 PT W/DXA RESULTS DOCUMENT: HCPCS | Performed by: INTERNAL MEDICINE

## 2018-04-04 RX ORDER — DESOXIMETASONE 2.5 MG/G
CREAM TOPICAL
Qty: 30 G | Refills: 0 | COMMUNITY
Start: 2018-04-04 | End: 2022-07-29 | Stop reason: SDUPTHER

## 2018-04-04 RX ORDER — AMLODIPINE BESYLATE 5 MG/1
2.5 TABLET ORAL DAILY
Qty: 90 TABLET | Refills: 3
Start: 2018-04-04 | End: 2019-07-24

## 2018-04-04 ASSESSMENT — ENCOUNTER SYMPTOMS
CHOKING: 0
FREQUENT THROAT CLEARING: 0
BELCHING: 0
SORE THROAT: 0
HEARTBURN: 0
SPUTUM PRODUCTION: 0
GLOBUS SENSATION: 0
WHEEZING: 0
HOARSE VOICE: 0
NAUSEA: 0
SHORTNESS OF BREATH: 0
DIFFICULTY BREATHING: 0
HEMOPTYSIS: 0
ORTHOPNEA: 0
BLURRED VISION: 0
ABDOMINAL PAIN: 0
CHEST TIGHTNESS: 0
COUGH: 0

## 2018-04-08 LAB
6-ACETYLMORPHINE: NOT DETECTED
7-AMINOCLONAZEPAM: NOT DETECTED
ALPHA-OH-ALPRAZOLAM: NOT DETECTED
ALPRAZOLAM: NOT DETECTED
AMPHETAMINE: NOT DETECTED
BARBITURATES: NOT DETECTED
BENZOYLECGONINE: NOT DETECTED
BUPRENORPHINE: NOT DETECTED
CARISOPRODOL: NOT DETECTED
CLONAZEPAM: NOT DETECTED
CODEINE: NOT DETECTED
CREATININE URINE: 113.1 MG/DL (ref 20–400)
DIAZEPAM: NOT DETECTED
DRUGS EXPECTED: NORMAL
EER PAIN MGT DRUG PANEL, HIGH RES/EMIT U: NORMAL
ETHYL GLUCURONIDE: NOT DETECTED
FENTANYL: NOT DETECTED
HYDROCODONE: NOT DETECTED
HYDROMORPHONE: NOT DETECTED
LORAZEPAM: NOT DETECTED
MARIJUANA METABOLITE: NOT DETECTED
MDA: NOT DETECTED
MDEA: NOT DETECTED
MDMA URINE: NOT DETECTED
MEPERIDINE: NOT DETECTED
METHADONE: NOT DETECTED
METHAMPHETAMINE: NOT DETECTED
METHYLPHENIDATE: NOT DETECTED
MIDAZOLAM: NOT DETECTED
MORPHINE: NOT DETECTED
NORBUPRENORPHINE, FREE: NOT DETECTED
NORDIAZEPAM: NOT DETECTED
NORFENTANYL: NOT DETECTED
NORHYDROCODONE, URINE: NOT DETECTED
NOROXYCODONE: NOT DETECTED
NOROXYMORPHONE, URINE: NOT DETECTED
OXAZEPAM: NOT DETECTED
OXYCODONE: NOT DETECTED
OXYMORPHONE: NOT DETECTED
PAIN MANAGEMENT DRUG PANEL: NORMAL
PAIN MANAGEMENT DRUG PANEL: NORMAL
PCP: NOT DETECTED
PHENTERMINE: NOT DETECTED
PROPOXYPHENE: NOT DETECTED
TAPENTADOL, URINE: NOT DETECTED
TAPENTADOL-O-SULFATE, URINE: NOT DETECTED
TEMAZEPAM: NOT DETECTED
TRAMADOL: PRESENT
ZOLPIDEM: NOT DETECTED

## 2018-06-21 DIAGNOSIS — G25.81 RLS (RESTLESS LEGS SYNDROME): Primary | ICD-10-CM

## 2018-06-22 RX ORDER — TRAMADOL HYDROCHLORIDE 50 MG/1
TABLET ORAL
Qty: 60 TABLET | Refills: 0 | Status: SHIPPED | OUTPATIENT
Start: 2018-06-22 | End: 2018-09-05 | Stop reason: SDUPTHER

## 2018-07-05 ENCOUNTER — OFFICE VISIT (OUTPATIENT)
Dept: INTERNAL MEDICINE CLINIC | Age: 73
End: 2018-07-05

## 2018-07-05 VITALS
WEIGHT: 135 LBS | BODY MASS INDEX: 24.84 KG/M2 | HEIGHT: 62 IN | HEART RATE: 68 BPM | DIASTOLIC BLOOD PRESSURE: 78 MMHG | RESPIRATION RATE: 16 BRPM | SYSTOLIC BLOOD PRESSURE: 116 MMHG

## 2018-07-05 DIAGNOSIS — J45.30 MILD PERSISTENT ASTHMA WITHOUT COMPLICATION: ICD-10-CM

## 2018-07-05 DIAGNOSIS — K21.9 GASTROESOPHAGEAL REFLUX DISEASE, ESOPHAGITIS PRESENCE NOT SPECIFIED: ICD-10-CM

## 2018-07-05 DIAGNOSIS — E78.2 MIXED HYPERLIPIDEMIA: ICD-10-CM

## 2018-07-05 DIAGNOSIS — R00.2 PALPITATIONS: ICD-10-CM

## 2018-07-05 DIAGNOSIS — I34.0 MODERATE MITRAL REGURGITATION: ICD-10-CM

## 2018-07-05 DIAGNOSIS — G25.81 RLS (RESTLESS LEGS SYNDROME): ICD-10-CM

## 2018-07-05 DIAGNOSIS — I10 ESSENTIAL HYPERTENSION: Primary | ICD-10-CM

## 2018-07-05 LAB
A/G RATIO: 1.9 (ref 1.1–2.2)
ALBUMIN SERPL-MCNC: 3.8 G/DL (ref 3.4–5)
ALP BLD-CCNC: 77 U/L (ref 40–129)
ALT SERPL-CCNC: 28 U/L (ref 10–40)
ANION GAP SERPL CALCULATED.3IONS-SCNC: 10 MMOL/L (ref 3–16)
AST SERPL-CCNC: 33 U/L (ref 15–37)
BILIRUB SERPL-MCNC: <0.2 MG/DL (ref 0–1)
BUN BLDV-MCNC: 14 MG/DL (ref 7–20)
CALCIUM SERPL-MCNC: 9.1 MG/DL (ref 8.3–10.6)
CHLORIDE BLD-SCNC: 107 MMOL/L (ref 99–110)
CO2: 27 MMOL/L (ref 21–32)
CREAT SERPL-MCNC: 0.6 MG/DL (ref 0.6–1.2)
GFR AFRICAN AMERICAN: >60
GFR NON-AFRICAN AMERICAN: >60
GLOBULIN: 2 G/DL
GLUCOSE BLD-MCNC: 96 MG/DL (ref 70–99)
LDL CHOLESTEROL DIRECT: 88 MG/DL
POTASSIUM SERPL-SCNC: 4.9 MMOL/L (ref 3.5–5.1)
SODIUM BLD-SCNC: 144 MMOL/L (ref 136–145)
TOTAL PROTEIN: 5.8 G/DL (ref 6.4–8.2)
TSH SERPL DL<=0.05 MIU/L-ACNC: 1.93 UIU/ML (ref 0.27–4.2)

## 2018-07-05 PROCEDURE — G8420 CALC BMI NORM PARAMETERS: HCPCS | Performed by: INTERNAL MEDICINE

## 2018-07-05 PROCEDURE — 4040F PNEUMOC VAC/ADMIN/RCVD: CPT | Performed by: INTERNAL MEDICINE

## 2018-07-05 PROCEDURE — G8399 PT W/DXA RESULTS DOCUMENT: HCPCS | Performed by: INTERNAL MEDICINE

## 2018-07-05 PROCEDURE — 1090F PRES/ABSN URINE INCON ASSESS: CPT | Performed by: INTERNAL MEDICINE

## 2018-07-05 PROCEDURE — 1123F ACP DISCUSS/DSCN MKR DOCD: CPT | Performed by: INTERNAL MEDICINE

## 2018-07-05 PROCEDURE — 3017F COLORECTAL CA SCREEN DOC REV: CPT | Performed by: INTERNAL MEDICINE

## 2018-07-05 PROCEDURE — G8427 DOCREV CUR MEDS BY ELIG CLIN: HCPCS | Performed by: INTERNAL MEDICINE

## 2018-07-05 PROCEDURE — 99214 OFFICE O/P EST MOD 30 MIN: CPT | Performed by: INTERNAL MEDICINE

## 2018-07-05 PROCEDURE — 36415 COLL VENOUS BLD VENIPUNCTURE: CPT | Performed by: INTERNAL MEDICINE

## 2018-07-05 PROCEDURE — 1036F TOBACCO NON-USER: CPT | Performed by: INTERNAL MEDICINE

## 2018-07-05 ASSESSMENT — ENCOUNTER SYMPTOMS
SORE THROAT: 0
GLOBUS SENSATION: 0
BELCHING: 0
SPUTUM PRODUCTION: 0
CHEST TIGHTNESS: 0
NAUSEA: 0
WHEEZING: 0
HEARTBURN: 0
CHOKING: 0
ABDOMINAL PAIN: 0
ORTHOPNEA: 0
BLURRED VISION: 0
DIFFICULTY BREATHING: 0
HEMOPTYSIS: 0
SHORTNESS OF BREATH: 0
HOARSE VOICE: 0
COUGH: 0
FREQUENT THROAT CLEARING: 0

## 2018-07-05 NOTE — PROGRESS NOTES
lungs. AS NEEDED. Yes Historical Provider, MD   Multiple Vitamins-Minerals (CENTRUM SILVER) TABS Take  by mouth. Yes Historical Provider, MD   Misc Natural Products (OSTEO BI-FLEX ADV TRIPLE ST) TABS Take 2 tablets by mouth daily. Yes Historical Provider, MD   Calcium Carbonate-Vit D-Min (CALTRATE 600+D PLUS) 600-400 MG-UNIT TABS Take 2 tablets by mouth daily. Yes Historical Provider, MD       /78 (Site: Left Arm, Position: Sitting, Cuff Size: Medium Adult)   Pulse 68   Resp 16   Ht 5' 2\" (1.575 m)   Wt 135 lb (61.2 kg)   LMP  (Approximate)   BMI 24.69 kg/m²     Objective:   Physical Exam   Constitutional: She appears well-developed and well-nourished. No distress. Neck: No JVD present. No thyromegaly present.   + right bruit   Cardiovascular: Normal rate and regular rhythm. Exam reveals no gallop and no friction rub. Murmur heard. Systolic murmur is present with a grade of 2/6   Pulmonary/Chest: Effort normal. No respiratory distress. She has no wheezes. She has no rhonchi. She has no rales. Abdominal: Soft. Bowel sounds are normal. She exhibits no distension. There is no tenderness. Musculoskeletal: Normal range of motion. She exhibits no edema. Skin: Skin is warm and dry. She is not diaphoretic. Vitals reviewed. Assessment:      1. Essential hypertension    2. RLS (restless legs syndrome)    3. Mixed hyperlipidemia    4. Gastroesophageal reflux disease, esophagitis presence not specified    5. Mild persistent asthma without complication    6. Moderate mitral regurgitation    7. Palpitations              Plan:          Kevin Benedict was seen today for hypertension. Diagnoses and all orders for this visit:    Essential hypertension:  Stable, cont same meds  -     Comprehensive Metabolic Panel; Future    RLS (restless legs syndrome): Stable, cont same meds    Mixed hyperlipidemia:  Stable, cont same meds  -     LDL Cholesterol, Direct;  Future  -     Comprehensive Metabolic Panel; Future    Gastroesophageal reflux disease, esophagitis presence not specified:  Stable, cont same meds  -     Comprehensive Metabolic Panel; Future    Mild persistent asthma without complication:  Stable, cont same meds    Moderate mitral regurgitation:  monitor    Palpitations:  Check labs and holter. -     Holter Monitor 24 Hour - Clinic Performed; Future  -     TSH without Reflex; Future        Controlled Substances Monitoring:     Attestation: The Prescription Monitoring Report for this patient was reviewed today. (Jane Davis MD)  Documentation: No signs of potential drug abuse or diversion identified. (Jane Davis MD)  Chronic Pain: Functional status reviewed - continues with improved or maintaining ADL's., Dose reduction has been attempted. (Jane Davis MD)  Medication Contracts: Existing medication contract.  (Jane Davis MD)      3 months

## 2018-08-07 ENCOUNTER — PATIENT MESSAGE (OUTPATIENT)
Dept: INTERNAL MEDICINE CLINIC | Age: 73
End: 2018-08-07

## 2018-08-07 DIAGNOSIS — R10.11 RUQ ABDOMINAL PAIN: Primary | ICD-10-CM

## 2018-08-14 ENCOUNTER — HOSPITAL ENCOUNTER (OUTPATIENT)
Dept: ULTRASOUND IMAGING | Age: 73
Discharge: HOME | End: 2018-08-14
Attending: INTERNAL MEDICINE | Admitting: INTERNAL MEDICINE

## 2018-08-14 DIAGNOSIS — R10.11 RUQ ABDOMINAL PAIN: ICD-10-CM

## 2018-08-14 DIAGNOSIS — R10.11 RIGHT UPPER QUADRANT PAIN: ICD-10-CM

## 2018-08-14 DIAGNOSIS — K80.20 GALL STONES: Primary | ICD-10-CM

## 2018-08-17 ENCOUNTER — INITIAL CONSULT (OUTPATIENT)
Dept: SURGERY | Age: 73
End: 2018-08-17

## 2018-08-17 VITALS
BODY MASS INDEX: 24.33 KG/M2 | SYSTOLIC BLOOD PRESSURE: 151 MMHG | HEART RATE: 104 BPM | DIASTOLIC BLOOD PRESSURE: 84 MMHG | WEIGHT: 133 LBS

## 2018-08-17 DIAGNOSIS — K80.20 SYMPTOMATIC CHOLELITHIASIS: Primary | ICD-10-CM

## 2018-08-17 PROCEDURE — 99203 OFFICE O/P NEW LOW 30 MIN: CPT | Performed by: SURGERY

## 2018-08-17 PROCEDURE — 1101F PT FALLS ASSESS-DOCD LE1/YR: CPT | Performed by: SURGERY

## 2018-08-17 PROCEDURE — 3017F COLORECTAL CA SCREEN DOC REV: CPT | Performed by: SURGERY

## 2018-08-17 PROCEDURE — 1090F PRES/ABSN URINE INCON ASSESS: CPT | Performed by: SURGERY

## 2018-08-17 PROCEDURE — G8427 DOCREV CUR MEDS BY ELIG CLIN: HCPCS | Performed by: SURGERY

## 2018-08-17 PROCEDURE — G8420 CALC BMI NORM PARAMETERS: HCPCS | Performed by: SURGERY

## 2018-08-17 NOTE — LETTER
CONSENT TO OPERATION   Laparoscopic Cholecystectomy with Cholangiogram, possible open     PATIENT : Yvonne Carcamo  DATE OF BIRTH:  [birth year] DATE : 8-17-18        1. I request and consent that Dr. Philip Lizama and/or his associates or assistants perform an operation and/or procedures on the above patient at [facility], to treat the condition(s) which appear indicated by the diagnostic studies already performed. 2. It has been explained to me by the informing physician that during the course of the operation and/or procedure(s) unforeseen conditions may be revealed that necessitate an extension of the original operation and/or procedure(s) or different operation and/or procedures than those set forth in Paragraph 1. I therefore authorize and request that my physician and/or his associates or assistants perform such operations and/or procedures as are necessary and desirable in the exercise of professional judgment. The authority granted under this Paragraph 2 shall extend to all conditions that require treatment and are known to my physician at the time the operation is commenced. 3. I have been made aware by the informing physician of certain risks and consequences that are associated with the operation and/or procedure(s) described in Paragraph 1, the reasonable alternative methods or treatment, the possible consequences, the possibility that the operation and/or procedure(s) may be unsuccessful and the possibility of complications. I understand the reasonably known risks to be:  ? Bleeding  ? Infection  ? Poor Healing  ? Possible Damage to Nerve, Vessel, Tendon/Muscle or Bone  ? Need for further Treatment/Surgery  ? Stiffness  ? Pain  ? Residual or Recurrent Symptoms  ? Anesthetic and/or Medical Risks     4.  I have also been informed by the informing physician that there are other risks from both known and unknown causes that are attendant to the performance of any surgical procedure. I am aware that the practice of medicine and surgery is not an exact science, and that no guarantees have been made to me concerning the results of the operation and/or procedure(s). 5. I consent to the administration of anesthesia and to the use of such anesthetics as may be deemed advisable by the anesthesiologist who has been engaged by me or my physician.     6. I certify that I have read and understand the above consent to operation and/or other procedure(s); that the explanations therein referred to were made to me by the informing physician in advance of my signing this consent; that all blanks or statements requiring insertion or completion were filled in and inapplicable paragraphs, if any, were stricken before I signed; and that all questions asked by me about the operation and/or procedure(s) which I have consented to have been fully answered in a satisfactory manner.           _________________            _______________________  Signature Of Patient   Date              Witness

## 2018-08-17 NOTE — PATIENT INSTRUCTIONS
Surgeon: Barbara Waterman MD     Your surgery will be at [facility] [place]  First floor of the [address] [place] [address]. [address]    Date:  Arrival time:  Surgery time:       You will be on a 15 lbs weight restriction for 6 weeks after surgery. No driving until your off the pain medication. Nothing to eat or drink after midnight the night before. You may be asked to stop blood thinners such as Coumadin, Plavix, Fragmin, Lovenox, etc., or any anti-inflammatories such as:  Aspirin, Ibuprofen, Advil, Naproxen prior to your surgery. We also ask that you stop any OTC medications such as fish oil, vitamin E, glucosamine, garlic, Multivitamins, COQ 10, etc.  Your time and instructions will be given to you by the surgery scheduler, Tod Borrego. She will call you as well as send you a letter with all the details regarding your surgery. Please make a History and Physical (surgery clearance) by your primary care physician prior to your surgery date. * within 30 days of surgery*   Please contact Safia if you need to reschedule your surgery or have any questions. Our office phone number is [phone].

## 2018-08-23 NOTE — PROGRESS NOTES
MOUTH EVERY 6 HOURS AS NEEDED FOR PAIN 120 tablet 5    Tiotropium Bromide Monohydrate (SPIRIVA RESPIMAT IN) Inhale into the lungs      predniSONE (DELTASONE) 5 MG tablet Take 5 mg by mouth every other day       cromolyn (INTAL) 20 MG/2ML nebulizer solution       beclomethasone (QVAR) 40 MCG/ACT inhaler 1 nasal spray each side bid 1 Inhaler     ALVESCO 160 MCG/ACT AERS       montelukast (SINGULAIR) 10 MG tablet Take 1 tablet by mouth nightly. 30 tablet 12    azelastine (ASTELIN) 137 MCG/SPRAY nasal spray 2 sprays by Nasal route daily. Use in each nostril as directed 3 Bottle 3    fexofenadine (ALLEGRA) 180 MG tablet Take 1 tablet by mouth daily. 90 tablet 3    albuterol (PROVENTIL HFA;VENTOLIN HFA) 108 (90 BASE) MCG/ACT inhaler Inhale 2 puffs into the lungs. AS NEEDED.  Multiple Vitamins-Minerals (CENTRUM SILVER) TABS Take  by mouth.  Misc Natural Products (OSTEO BI-FLEX ADV TRIPLE ST) TABS Take 2 tablets by mouth daily.  Calcium Carbonate-Vit D-Min (CALTRATE 600+D PLUS) 600-400 MG-UNIT TABS Take 2 tablets by mouth daily. Past Medical History:   Diagnosis Date    Arthritis     Bronchial asthma     GERD (gastroesophageal reflux disease)     Hyperlipidemia     Hypertension     Mild tricuspid regurgitation 5/26/2016    Moderate mitral regurgitation 5/26/2016    By echo 1/2016. Needs yrly echo    RLS (restless legs syndrome)     Seasonal allergies      Past Surgical History:   Procedure Laterality Date    DILATION AND CURETTAGE OF UTERUS      NASAL SINUS SURGERY      x 3    TUBAL LIGATION       Family History   Problem Relation Age of Onset    Heart Disease Mother         chf    Stroke Mother     Dementia Father      Social History     Social History    Marital status:      Spouse name: N/A    Number of children: N/A    Years of education: N/A     Occupational History    Not on file.      Social History Main Topics    Smoking status: Never Smoker   

## 2018-08-27 ENCOUNTER — OFFICE VISIT (OUTPATIENT)
Dept: INTERNAL MEDICINE CLINIC | Age: 73
End: 2018-08-27

## 2018-08-27 VITALS
DIASTOLIC BLOOD PRESSURE: 62 MMHG | RESPIRATION RATE: 16 BRPM | OXYGEN SATURATION: 98 % | WEIGHT: 133 LBS | HEART RATE: 91 BPM | SYSTOLIC BLOOD PRESSURE: 140 MMHG | HEIGHT: 62 IN | BODY MASS INDEX: 24.48 KG/M2

## 2018-08-27 DIAGNOSIS — E78.2 MIXED HYPERLIPIDEMIA: ICD-10-CM

## 2018-08-27 DIAGNOSIS — I34.0 MODERATE MITRAL REGURGITATION: ICD-10-CM

## 2018-08-27 DIAGNOSIS — Z01.818 PREOP EXAMINATION: Primary | ICD-10-CM

## 2018-08-27 DIAGNOSIS — G25.81 RLS (RESTLESS LEGS SYNDROME): ICD-10-CM

## 2018-08-27 DIAGNOSIS — M19.90 ARTHRITIS: ICD-10-CM

## 2018-08-27 DIAGNOSIS — I07.1 MILD TRICUSPID REGURGITATION: ICD-10-CM

## 2018-08-27 DIAGNOSIS — I10 ESSENTIAL HYPERTENSION: ICD-10-CM

## 2018-08-27 DIAGNOSIS — J45.30 MILD PERSISTENT ASTHMA WITHOUT COMPLICATION: ICD-10-CM

## 2018-08-27 DIAGNOSIS — K21.9 GASTROESOPHAGEAL REFLUX DISEASE, ESOPHAGITIS PRESENCE NOT SPECIFIED: ICD-10-CM

## 2018-08-27 DIAGNOSIS — J30.2 SEASONAL ALLERGIES: ICD-10-CM

## 2018-08-27 DIAGNOSIS — K80.20 CALCULUS OF GALLBLADDER WITHOUT CHOLECYSTITIS WITHOUT OBSTRUCTION: ICD-10-CM

## 2018-08-27 PROCEDURE — 1101F PT FALLS ASSESS-DOCD LE1/YR: CPT | Performed by: INTERNAL MEDICINE

## 2018-08-27 PROCEDURE — 99213 OFFICE O/P EST LOW 20 MIN: CPT | Performed by: INTERNAL MEDICINE

## 2018-08-27 PROCEDURE — G8420 CALC BMI NORM PARAMETERS: HCPCS | Performed by: INTERNAL MEDICINE

## 2018-08-27 PROCEDURE — 93000 ELECTROCARDIOGRAM COMPLETE: CPT | Performed by: INTERNAL MEDICINE

## 2018-08-27 PROCEDURE — 3017F COLORECTAL CA SCREEN DOC REV: CPT | Performed by: INTERNAL MEDICINE

## 2018-08-27 PROCEDURE — 1090F PRES/ABSN URINE INCON ASSESS: CPT | Performed by: INTERNAL MEDICINE

## 2018-08-27 PROCEDURE — G8427 DOCREV CUR MEDS BY ELIG CLIN: HCPCS | Performed by: INTERNAL MEDICINE

## 2018-08-27 ASSESSMENT — ENCOUNTER SYMPTOMS
DIARRHEA: 0
EYES NEGATIVE: 1
VOMITING: 0
RESPIRATORY NEGATIVE: 1
CONSTIPATION: 0
BLOOD IN STOOL: 0
ABDOMINAL DISTENTION: 0
NAUSEA: 1
ALLERGIC/IMMUNOLOGIC NEGATIVE: 1
ABDOMINAL PAIN: 1
RECTAL PAIN: 0
ANAL BLEEDING: 0

## 2018-08-27 NOTE — PROGRESS NOTES
status: Never Smoker    Smokeless tobacco: Never Used    Alcohol use No    Drug use: No    Sexual activity: Yes     Partners: Male     Other Topics Concern    Not on file     Social History Narrative    No narrative on file     family history includes Arthritis in her father and mother; Cancer in her father; Dementia in her father; Heart Disease in her mother; Stroke in her mother. BP (!) 140/62 (Site: Left Arm, Position: Sitting, Cuff Size: Medium Adult)   Pulse 91   Resp 16   Ht 5' 2\" (1.575 m)   Wt 133 lb (60.3 kg)   LMP  (Approximate)   SpO2 98%   BMI 24.33 kg/m²     Objective:   Physical Exam   Constitutional: She is oriented to person, place, and time. She appears well-developed and well-nourished. No distress. HENT:   Head: Normocephalic and atraumatic. Right Ear: External ear normal.   Left Ear: External ear normal.   Nose: Nose normal.   Mouth/Throat: Oropharynx is clear and moist. No oropharyngeal exudate. Eyes: Pupils are equal, round, and reactive to light. Conjunctivae and EOM are normal. Right eye exhibits no discharge. Left eye exhibits no discharge. No scleral icterus. Neck: Normal range of motion. Neck supple. No JVD present. No thyromegaly present. Cardiovascular: Normal rate, regular rhythm and intact distal pulses. Exam reveals no gallop and no friction rub. Murmur heard. Pulmonary/Chest: Effort normal and breath sounds normal. No respiratory distress. She has no wheezes. She has no rales. She exhibits no tenderness. Abdominal: Soft. Bowel sounds are normal. She exhibits no distension and no mass. There is tenderness in the right upper quadrant. There is no rebound and no guarding. Musculoskeletal: Normal range of motion. She exhibits no edema or tenderness. Lymphadenopathy:     She has no cervical adenopathy. Neurological: She is alert and oriented to person, place, and time. She has normal reflexes. No cranial nerve deficit.  Coordination normal.   Skin:

## 2018-08-28 ENCOUNTER — PAT TELEPHONE (OUTPATIENT)
Dept: PREADMISSION TESTING | Age: 73
End: 2018-08-28

## 2018-08-28 ENCOUNTER — SURG/PROC ORDERS (OUTPATIENT)
Dept: ANESTHESIOLOGY | Age: 73
End: 2018-08-28

## 2018-08-28 RX ORDER — SODIUM CHLORIDE 0.9 % (FLUSH) 0.9 %
10 SYRINGE (ML) INJECTION EVERY 12 HOURS SCHEDULED
Status: CANCELLED | OUTPATIENT
Start: 2018-08-28 | End: 2019-08-28

## 2018-08-28 RX ORDER — SODIUM CHLORIDE 0.9 % (FLUSH) 0.9 %
10 SYRINGE (ML) INJECTION PRN
Status: CANCELLED | OUTPATIENT
Start: 2018-08-28 | End: 2019-08-28

## 2018-08-28 RX ORDER — SODIUM CHLORIDE 9 MG/ML
INJECTION, SOLUTION INTRAVENOUS CONTINUOUS
Status: CANCELLED | OUTPATIENT
Start: 2018-08-28 | End: 2019-08-28

## 2018-08-28 NOTE — PRE-PROCEDURE INSTRUCTIONS
[facility] time_____0700_______        Surgery time____________    Take the following medications with a sip of water:See MAR    Do not eat or drink anything after 12:00 midnight prior to your surgery. This includes water chewing gum, mints and ice chips. You may brush your teeth and gargle the morning of your surgery, but do not swallow the water     Please see your family doctor/pediatrician for a history and physical and/or concerning medications. Bring any test results/reports from your physicians office. If you are under the care of a heart doctor or specialist doctor, please be aware that you may be asked to them for clearance    You may be asked to stop blood thinners such as Coumadin, Plavix, Fragmin, Lovenox, etc., or any anti-inflammatories such as:  Aspirin, Ibuprofen, Advil, Naproxen prior to your surgery. We also ask that you stop any OTC medications such as fish oil, vitamin E, glucosamine, garlic, Multivitamins, COQ 10, etc.    We ask that you do not smoke 24 hours prior to surgery  We ask that you do not  drink any alcoholic beverages 24 hours prior to surgery     You must make arrangements for a responsible adult to take you home after your surgery. For your safety you will not be allowed to leave alone or drive yourself home. Your surgery will be cancelled if you do not have a ride home. Also for your safety, it is strongly suggested that someone stay with you the first 24 hours after your surgery. A parent or legal guardian must accompany a child scheduled for surgery and plan to stay at the hospital until the child is discharged. Please do not bring other children with you. For your comfort, please wear simple loose fitting clothing to the hospital.  Please do not bring valuables.     Do not wear any make-up or nail polish on your fingers or toes      For your safety, please do not wear any jewelry or body piercing's on the day of surgery. All jewelry must be removed. If you have dentures, they will be removed before going to operating room. For your convenience, we will provide you with a container. If you wear contact lenses or glasses, they will be removed, please bring a case for them. If you have a living will and a durable power of  for healthcare, please bring in a copy. As part of our patient safety program to minimize surgical site infections, we ask you to do the following:    · Please notify your surgeon if you develop any illness between         now and the  day of your surgery. · This includes a cough, cold, fever, sore throat, nausea,         or vomiting, and diarrhea, etc.  ·  Please notify your surgeon if you experience dizziness, shortness         of breath or blurred vision between now and the time of your surgery. Do not shave your operative site 96 hours prior to surgery. For face and neck surgery, men may use an electric razor 48 hours   prior to surgery. You may shower the night before surgery or the morning of   your surgery with an antibacterial soap. You will need to bring a photo ID and insurance card    [facility], would you like to utilize our pharmacy     If you will be staying overnight and use a C-pap machine, please bring   your C-pap to hospital     Our goal is to provide you with excellent care, therefore, visitors will be limited to two(2) in the room at a time so that we may focus on providing this care for you. Please contact pre-admission testing if you have any further questions. [facility] phone number:  [phone] PAT fax number:  [phone]  Please note these are generalized instructions for all surgical cases, you may be provided with more specific instructions according to your surgery.

## 2018-08-30 ENCOUNTER — HOSPITAL ENCOUNTER (OUTPATIENT)
Dept: SURGERY | Age: 73
Discharge: HOME | End: 2018-08-30
Attending: SURGERY | Admitting: SURGERY

## 2018-08-30 VITALS
OXYGEN SATURATION: 97 % | TEMPERATURE: 97.3 F | HEART RATE: 79 BPM | RESPIRATION RATE: 16 BRPM | DIASTOLIC BLOOD PRESSURE: 76 MMHG | SYSTOLIC BLOOD PRESSURE: 150 MMHG

## 2018-08-30 DIAGNOSIS — K80.20 SYMPTOMATIC CHOLELITHIASIS: Primary | ICD-10-CM

## 2018-08-30 LAB
A/G RATIO: 1.4 (ref 1.1–2.2)
ALBUMIN SERPL-MCNC: 3.6 G/DL (ref 3.4–5)
ALP BLD-CCNC: 95 U/L (ref 40–129)
ALT SERPL-CCNC: 20 U/L (ref 10–40)
ANION GAP SERPL CALCULATED.3IONS-SCNC: 12 MMOL/L (ref 3–16)
AST SERPL-CCNC: 26 U/L (ref 15–37)
BILIRUB SERPL-MCNC: <0.2 MG/DL (ref 0–1)
BUN BLDV-MCNC: 11 MG/DL (ref 7–20)
CALCIUM SERPL-MCNC: 8.8 MG/DL (ref 8.3–10.6)
CHLORIDE BLD-SCNC: 105 MMOL/L (ref 99–110)
CO2: 26 MMOL/L (ref 21–32)
CREAT SERPL-MCNC: 0.6 MG/DL (ref 0.6–1.2)
GFR AFRICAN AMERICAN: >60
GFR NON-AFRICAN AMERICAN: >60
GLOBULIN: 2.6 G/DL
GLUCOSE BLD-MCNC: 101 MG/DL (ref 70–99)
HCT VFR BLD CALC: 37.6 % (ref 36–48)
HEMOGLOBIN: 12.4 G/DL (ref 12–16)
MCH RBC QN AUTO: 28.8 PG (ref 26–34)
MCHC RBC AUTO-ENTMCNC: 32.9 G/DL (ref 31–36)
MCV RBC AUTO: 87.4 FL (ref 80–100)
PDW BLD-RTO: 14.5 % (ref 12.4–15.4)
PLATELET # BLD: 295 K/UL (ref 135–450)
PMV BLD AUTO: 9.1 FL (ref 5–10.5)
POTASSIUM REFLEX MAGNESIUM: 4 MMOL/L (ref 3.5–5.1)
RBC # BLD: 4.3 M/UL (ref 4–5.2)
SODIUM BLD-SCNC: 143 MMOL/L (ref 136–145)
TOTAL PROTEIN: 6.2 G/DL (ref 6.4–8.2)
WBC # BLD: 8.2 K/UL (ref 4–11)

## 2018-08-30 PROCEDURE — 47563 LAPARO CHOLECYSTECTOMY/GRAPH: CPT | Performed by: SURGERY

## 2018-08-30 RX ORDER — FENTANYL CITRATE 50 UG/ML
50 INJECTION, SOLUTION INTRAMUSCULAR; INTRAVENOUS EVERY 5 MIN PRN
Status: COMPLETED | OUTPATIENT
Start: 2018-08-30 | End: 2018-08-30

## 2018-08-30 RX ORDER — MORPHINE SULFATE 2 MG/ML
2 INJECTION, SOLUTION INTRAMUSCULAR; INTRAVENOUS EVERY 5 MIN PRN
Status: DISCONTINUED | OUTPATIENT
Start: 2018-08-30 | End: 2018-08-31 | Stop reason: HOSPADM

## 2018-08-30 RX ORDER — OXYCODONE HYDROCHLORIDE AND ACETAMINOPHEN 5; 325 MG/1; MG/1
2 TABLET ORAL PRN
Status: ACTIVE | OUTPATIENT
Start: 2018-08-30 | End: 2018-08-30

## 2018-08-30 RX ORDER — FENTANYL CITRATE 50 UG/ML
25 INJECTION, SOLUTION INTRAMUSCULAR; INTRAVENOUS EVERY 5 MIN PRN
Status: DISCONTINUED | OUTPATIENT
Start: 2018-08-30 | End: 2018-08-31 | Stop reason: HOSPADM

## 2018-08-30 RX ORDER — ONDANSETRON 2 MG/ML
4 INJECTION INTRAMUSCULAR; INTRAVENOUS
Status: ACTIVE | OUTPATIENT
Start: 2018-08-30 | End: 2018-08-30

## 2018-08-30 RX ORDER — SODIUM CHLORIDE 9 MG/ML
INJECTION, SOLUTION INTRAVENOUS CONTINUOUS
Status: DISCONTINUED | OUTPATIENT
Start: 2018-08-30 | End: 2018-08-31 | Stop reason: HOSPADM

## 2018-08-30 RX ORDER — OXYCODONE HYDROCHLORIDE AND ACETAMINOPHEN 5; 325 MG/1; MG/1
1 TABLET ORAL PRN
Status: ACTIVE | OUTPATIENT
Start: 2018-08-30 | End: 2018-08-30

## 2018-08-30 RX ORDER — OXYCODONE HYDROCHLORIDE 5 MG/1
5-10 TABLET ORAL EVERY 6 HOURS PRN
Qty: 28 TABLET | Refills: 0 | Status: SHIPPED | OUTPATIENT
Start: 2018-08-30 | End: 2018-09-06

## 2018-08-30 RX ORDER — MORPHINE SULFATE 2 MG/ML
1 INJECTION, SOLUTION INTRAMUSCULAR; INTRAVENOUS EVERY 5 MIN PRN
Status: DISCONTINUED | OUTPATIENT
Start: 2018-08-30 | End: 2018-08-31 | Stop reason: HOSPADM

## 2018-08-30 RX ORDER — SODIUM CHLORIDE 0.9 % (FLUSH) 0.9 %
10 SYRINGE (ML) INJECTION PRN
Status: DISCONTINUED | OUTPATIENT
Start: 2018-08-30 | End: 2018-08-31 | Stop reason: HOSPADM

## 2018-08-30 RX ORDER — CIPROFLOXACIN 2 MG/ML
400 INJECTION, SOLUTION INTRAVENOUS
Status: COMPLETED | OUTPATIENT
Start: 2018-08-30 | End: 2018-08-30

## 2018-08-30 RX ORDER — MEPERIDINE HYDROCHLORIDE 25 MG/ML
12.5 INJECTION INTRAMUSCULAR; INTRAVENOUS; SUBCUTANEOUS EVERY 5 MIN PRN
Status: DISCONTINUED | OUTPATIENT
Start: 2018-08-30 | End: 2018-08-31 | Stop reason: HOSPADM

## 2018-08-30 RX ORDER — OXYCODONE HYDROCHLORIDE 5 MG/1
5 TABLET ORAL EVERY 4 HOURS PRN
Status: DISCONTINUED | OUTPATIENT
Start: 2018-08-30 | End: 2018-08-31 | Stop reason: HOSPADM

## 2018-08-30 RX ORDER — SODIUM CHLORIDE 0.9 % (FLUSH) 0.9 %
10 SYRINGE (ML) INJECTION EVERY 12 HOURS SCHEDULED
Status: DISCONTINUED | OUTPATIENT
Start: 2018-08-30 | End: 2018-08-31 | Stop reason: HOSPADM

## 2018-08-30 RX ADMIN — FENTANYL CITRATE 50 MCG: 50 INJECTION, SOLUTION INTRAMUSCULAR; INTRAVENOUS at 09:45

## 2018-08-30 RX ADMIN — FENTANYL CITRATE 50 MCG: 50 INJECTION, SOLUTION INTRAMUSCULAR; INTRAVENOUS at 09:58

## 2018-08-30 RX ADMIN — OXYCODONE HYDROCHLORIDE 5 MG: 5 TABLET ORAL at 11:51

## 2018-08-30 RX ADMIN — FENTANYL CITRATE 50 MCG: 50 INJECTION, SOLUTION INTRAMUSCULAR; INTRAVENOUS at 09:50

## 2018-08-30 RX ADMIN — SODIUM CHLORIDE: 9 INJECTION, SOLUTION INTRAVENOUS at 07:49

## 2018-08-30 RX ADMIN — FENTANYL CITRATE 50 MCG: 50 INJECTION, SOLUTION INTRAMUSCULAR; INTRAVENOUS at 10:24

## 2018-08-30 RX ADMIN — CIPROFLOXACIN 400 MG: 2 INJECTION, SOLUTION INTRAVENOUS at 07:49

## 2018-08-30 ASSESSMENT — PAIN DESCRIPTION - LOCATION: LOCATION: ABDOMEN

## 2018-08-30 ASSESSMENT — PAIN SCALES - GENERAL
PAINLEVEL_OUTOF10: 10
PAINLEVEL_OUTOF10: 0
PAINLEVEL_OUTOF10: 9
PAINLEVEL_OUTOF10: 5
PAINLEVEL_OUTOF10: 5
PAINLEVEL_OUTOF10: 8
PAINLEVEL_OUTOF10: 7

## 2018-08-30 ASSESSMENT — PAIN - FUNCTIONAL ASSESSMENT
PAIN_FUNCTIONAL_ASSESSMENT: 0-10
PAIN_FUNCTIONAL_ASSESSMENT: 0-10

## 2018-08-30 ASSESSMENT — LIFESTYLE VARIABLES: SMOKING_STATUS: 0

## 2018-08-30 ASSESSMENT — PAIN DESCRIPTION - PAIN TYPE: TYPE: SURGICAL PAIN

## 2018-08-30 NOTE — ANESTHESIA PRE-OP
amLODIPine (NORVASC) 5 MG tablet Take 0.5 tablets by mouth daily 90 tablet 3    benralizumab (FASENRA) 30 MG/ML SOSY injection Inject 30 mg into the skin      desoximetasone (TOPICORT) 0.25 % cream Apply topically 2 times daily. 30 g 0    mupirocin (BACTROBAN) 2 % ointment Apply topically 3 times daily.  simvastatin (ZOCOR) 20 MG tablet TAKE 1 TABLET EVERY NIGHT 90 tablet 3    alendronate (FOSAMAX) 70 MG tablet TAKE 1 TABLET BY MOUTH ONCE WEEKLY BEFORE BREAKFAST, ON AN EMPTY STOMACH: REMAIN UPRIGHT FOR 30 MINUTES 12 tablet 3    ibuprofen (ADVIL;MOTRIN) 600 MG tablet TAKE ONE TABLET BY MOUTH EVERY 6 HOURS AS NEEDED FOR PAIN 120 tablet 5    Tiotropium Bromide Monohydrate (SPIRIVA RESPIMAT IN) Inhale into the lungs      predniSONE (DELTASONE) 5 MG tablet Take 5 mg by mouth every other day       cromolyn (INTAL) 20 MG/2ML nebulizer solution       beclomethasone (QVAR) 40 MCG/ACT inhaler 1 nasal spray each side bid 1 Inhaler     ALVESCO 160 MCG/ACT AERS       montelukast (SINGULAIR) 10 MG tablet Take 1 tablet by mouth nightly. 30 tablet 12    azelastine (ASTELIN) 137 MCG/SPRAY nasal spray 2 sprays by Nasal route daily. Use in each nostril as directed 3 Bottle 3    fexofenadine (ALLEGRA) 180 MG tablet Take 1 tablet by mouth daily. 90 tablet 3    albuterol (PROVENTIL HFA;VENTOLIN HFA) 108 (90 BASE) MCG/ACT inhaler Inhale 2 puffs into the lungs. AS NEEDED.  Multiple Vitamins-Minerals (CENTRUM SILVER) TABS Take  by mouth.  Misc Natural Products (OSTEO BI-FLEX ADV TRIPLE ST) TABS Take 2 tablets by mouth daily.  Calcium Carbonate-Vit D-Min (CALTRATE 600+D PLUS) 600-400 MG-UNIT TABS Take 2 tablets by mouth daily. No current facility-administered medications on file prior to encounter.       Current Outpatient Prescriptions   Medication Sig Dispense Refill    lansoprazole (PREVACID) 30 MG delayed release capsule TAKE 1 CAPSULE EVERY DAY 90 capsule 0    amLODIPine (NORVASC) 5 MG Past Medical History:        Diagnosis Date    Arthritis     Bronchial asthma     GERD (gastroesophageal reflux disease)     Hyperlipidemia     Hypertension     Mild tricuspid regurgitation 5/26/2016    Moderate mitral regurgitation 5/26/2016    By echo 1/2016. Needs yrly echo    Prolonged emergence from general anesthesia     RLS (restless legs syndrome)     Seasonal allergies        Past Surgical History:        Procedure Laterality Date    DILATION AND CURETTAGE OF UTERUS      NASAL SINUS SURGERY      x 3    TUBAL LIGATION         Social History:    Social History   Substance Use Topics    Smoking status: Never Smoker    Smokeless tobacco: Never Used    Alcohol use No                                Counseling given: Not Answered      Vital Signs (Current): There were no vitals filed for this visit. BP Readings from Last 3 Encounters:   08/27/18 (!) 140/62   08/17/18 (!) 151/84   07/05/18 116/78       NPO Status:                           mn+, see mar for am meds                                                      BMI:   Wt Readings from Last 3 Encounters:   08/27/18 133 lb (60.3 kg)   08/17/18 133 lb (60.3 kg)   07/05/18 135 lb (61.2 kg)     There is no height or weight on file to calculate BMI. Anesthesia Evaluation  Patient summary reviewed history of anesthetic complications (slow emerge): Airway: Mallampati: III  TM distance: >3 FB   Neck ROM: full  Mouth opening: < 3 FB Dental:          Pulmonary: breath sounds clear to auscultation  (+) asthma (stable w/ daily inhalers): allergic asthma,     (-) not a current smoker          Patient did not smoke on day of surgery.                  Cardiovascular:    (+) hypertension:, valvular problems/murmurs (and tr): MR, hyperlipidemia      ECG reviewed  Rhythm: regular  Rate: normal  Echocardiogram reviewed         Beta Blocker:  Not on Beta Blocker         Neuro/Psych:   (+) neuromuscular disease (rls):,             GI/Hepatic/Renal:   (+) GERD:,      (-) liver disease and no renal disease       Endo/Other:    (+) : arthritis:., no malignancy/cancer. (-) diabetes mellitus, hypothyroidism, hyperthyroidism, no malignancy/cancer               Abdominal:           Vascular: negative vascular ROS. Anesthesia Plan      general     ASA 3       Induction: intravenous. MIPS: Postoperative opioids intended and Prophylactic antiemetics administered. Anesthetic plan and risks discussed with patient and spouse. Plan discussed with CRNA. This pre-anesthesia assessment may be used as a history and physical.    DOS STAFF ADDENDUM:    Pt seen and examined, chart reviewed (including anesthesia, drug and allergy history). No interval changes to history and physical examination. Anesthetic plan, risks, benefits, alternatives, and personnel involved discussed with patient. Patient verbalized an understanding and agrees to proceed.       Dimitrios Llamas MD  August 30, 2018  7:35 AM      Dimitrios Llamas MD   8/30/2018

## 2018-08-30 NOTE — OP NOTE
Laparoscopic Cholecystectomy Operative Report      Patient: Barbara Longoria MRN: [id number]     YOB: 1945  Age: 68 y.o. Sex: female        Primary Care Physician: Fidel Aguilera MD         DATE OF OPERATION: 8/30/2018     PREOPERATIVE DIAGNOSIS: symptomatic cholelithiasis    POSTOPERATIVE DIAGNOSIS: same    PROCEDURE PERFORMED: Laparoscopic cholecystectomy with cholangiogram    SURGEON: Kunal Fairbanks MD    ANESTHESIA: General endotracheal    ASA CLASS: 3    DVT PROPHYLAXIS: bilateral SCDs    ANTIBIOTICS: cipro 400 mg IV preoperatively    INDICATIONS: Yvonne Carcamo presented with a history of RUQ Pain. She had an ultrasound that showed gallstones. The patient's symptoms were felt to be secondary to gallbladder disease and she presented electively for removal after the risks, benefits and alternatives were discussed with her. Procedure Details:       After informed consent was obtained, the patient was brought to the operating room and placed on the table in the supine position. Preoperative antibiotics were given. Once under general endotracheal anesthesia, the abdomen was prepped and sterilely draped in the standard fashion. A time-out was performed for patient and procedure verification. A small vertical incision was made just at the umbilicus and a Veress needle inserted into peritoneal cavity. The abdomen was insufflated with carbon dioxide to a pressure of 15 mmHg. A 5mm trocar was introduced and a camera placed. Under direct vision, a 10mm port was placed in the subxiphoid location and two 5 mm ports placed in the right upper quadrant. The dome of the gallbladder was grasped and elevated up and over the liver. The patient had some adhesions but no active inflammation. We bluntly took down the peritoneum over the infundibulum of the gallbladder. The infundibulum was then grasped and retracted laterally exposing the triangle of Calot.   We performed our dissection until the anatomy was very clear as we could see two and only two structures entering the gallbladder and completing our critical view of safety. A clip was placed on the gallbladder side of the cystic duct. A small stab incision was made just below the costal margin in the RUQ and the cholangiogram catheter was introduced. A ductotomy was made in the cystic duct and the catheter inserted. Cholangiogram showed contrast flow into the duodenum. Common bile duct, common hepatic duct, cystic duct and junction of the left and right hepatic ducts were well visualized. No filling defects were noted. The catheter was removed. The cystic duct was further cleared circumferentially was clipped proximally with 3 clips and divided. The artery was likewise identified, clipped and divided. The gallbladder was removed from the liver bed with cautery and removed through the epigastric port site. The abdomen was reinspected and found to have good hemostasis. The epigastric trocar site fascia was closed with a figure of eight suture of 0-Vicryl using a laparoscopic suture passer. The trocars were withdrawn and the skin closed with 4-0 Vicryl. Skin affix was applied after injecting local anesthetic. The patient was awoken and extubated without complication. All instrument counts were said to be correct.         Findings: few adhesions, single gallstone, normal cholangiogram    Estimated Blood Loss: less than 50 ml    Complications: none           Specimen: gallbladder and contents                  Electronically signed by Le Salinas MD on 8/30/2018 at 9:25 AM

## 2018-08-30 NOTE — PROGRESS NOTES
Alert and oriented. C/o 5-6/10 surgical abd pain. Analgesic given. Tolerated sitting up and po fluids and crackers well. Denies nausea. Family at bedside. Verbalizes understanding of discharge instructions. Surgical glue is clean dry intact.
Assisted to restroom and voided without difficulty. Family at bedside. Tolerated sitting up. Sipping on ginger ale and nibbling on saltine.
Pt awake. States pain 7/10 if she is moving and \"not noticeable\" if not moving. Abd rounded but soft.   To phase 2 care per stretcher with RN assist.
Pt medicated with fentanyl 50 mcg IVP. See emar.
Pt states pain 9/10. Medicated with fentanyl 50 mcg IVP. See emar.
To pacu from OR. Pt awake screaming. Thrashing around in bed. Abd soft with 5 small incisions noted with surgical glue - dry and intact. IV infusing. Monitor in sinus rhythm.
leg pain L/leg pain R

## 2018-08-30 NOTE — H&P
H&P Update    Patient's History and Physical from August 30, 2018 was reviewed. Patient examined. There has been no change. Patient has symptomatic cholelithiasis. Will proceed with laparoscopic cholecystectomy with cholangiogram, possible open. Cipro ordered. Bilateral SCDs.     Eric Villalobos MD

## 2018-08-30 NOTE — ANESTHESIA POST-OP
[facility] Department of Anesthesiology  Post-Anesthesia Note       Name:  Shaylee Gay                                         Age:  68 y.o. MRN:  [id number]     Last Vitals & Oxygen Saturation: BP (!) 150/76   Pulse 79   Temp 97.3 °F (36.3 °C) (Temporal)   Resp 16   LMP  (Approximate)   SpO2 97%   Patient Vitals for the past 4 hrs:   BP Temp Temp src Pulse Resp SpO2   08/30/18 1129 (!) 150/76 - - 79 16 97 %   08/30/18 1105 (!) 157/76 97.3 °F (36.3 °C) Temporal 79 16 94 %   08/30/18 1052 - 97 °F (36.1 °C) Temporal 82 23 92 %   08/30/18 1047 (!) 153/71 - - 83 14 93 %       Level of consciousness:  Awake, alert    Respiratory: Respirations easy, no distress. Stable. Cardiovascular: Hemodynamically stable. Hydration: Adequate. PONV: Adequately managed. Post-op pain: Adequately controlled. Post-op assessment: Tolerated anesthetic well without complication. Complications:  None.     Danie Alexis MD  August 30, 2018   2:42 PM

## 2018-09-05 DIAGNOSIS — G25.81 RLS (RESTLESS LEGS SYNDROME): ICD-10-CM

## 2018-09-05 RX ORDER — TRAMADOL HYDROCHLORIDE 50 MG/1
50 TABLET ORAL 2 TIMES DAILY PRN
Qty: 60 TABLET | Refills: 1 | Status: SHIPPED | OUTPATIENT
Start: 2018-09-05 | End: 2018-10-15 | Stop reason: SDUPTHER

## 2018-09-13 ENCOUNTER — OFFICE VISIT (OUTPATIENT)
Dept: SURGERY | Age: 73
End: 2018-09-13

## 2018-09-13 VITALS
SYSTOLIC BLOOD PRESSURE: 150 MMHG | DIASTOLIC BLOOD PRESSURE: 77 MMHG | WEIGHT: 130 LBS | BODY MASS INDEX: 23.78 KG/M2 | HEART RATE: 91 BPM

## 2018-09-13 DIAGNOSIS — Z90.49 S/P LAPAROSCOPIC CHOLECYSTECTOMY: ICD-10-CM

## 2018-09-13 DIAGNOSIS — K80.20 SYMPTOMATIC CHOLELITHIASIS: Primary | ICD-10-CM

## 2018-09-13 PROCEDURE — 99024 POSTOP FOLLOW-UP VISIT: CPT | Performed by: SURGERY

## 2018-09-13 NOTE — PROGRESS NOTES
Post Operative Visit    [facility]  [place] [facility]   Trell Brown MD    [address], [address]  [place] Kam[place]  Phone: [phone]  Fax: [phone]    Kathleen Moscoso   YOB: 1945    Date of Visit:  9/13/2018    No ref. provider found  Koby Harris MD    HPI:     Gallbladder: Kathleen Moscoso is 68 y.o. female presenting for her 2 week follow up visit after laparoscopic cholecystectomy, which was performed for symptomatic cholelithiasis. Overall her pain has improved, but is still having a moderate amount of incisional pain. She is taking tylenol and ibuprofen for her pain. She is eating and drinking without difficulty. BMs are normal.  No jaundice. No fevers or chills. Pain Score:   0 - No pain    Vitals:    09/13/18 1403   BP: (!) 150/77   Pulse: 91   Weight: 130 lb (59 kg)     Body mass index is 23.78 kg/m². PHYSICAL EXAM:    CONSTITUTIONAL:  alert, no apparent distress  LUNGS:  Resp easy and unlabored  ABDOMEN:  Incisions healing well, no erythema or induration, soft, non-distended, non-tender, voluntary guarding absent, and hernia absent  MUSCULOSKELETAL: No edema  NEUROLOGIC:  Mental Status Exam:  Level of Alertness:   awake  Orientation:   person, place, time        Pathology:  FINAL DIAGNOSIS:    Gallbladder, cholecystectomy:     - Chronic cholecystitis with cholelithiasis.      MUTGE/MUTGE    ASSESSMENT:     Diagnosis Orders   1. Symptomatic cholelithiasis     2. S/P laparoscopic cholecystectomy           PLAN:    Overall Yvonne Carcamo is doing well. Abdominal pain has improved. Continue tylenol/ibuprofen for pain control. She is tolerating a regular diet with normal bowel function. Incisions are healing well. Continue with lifting restrictions for the next 4 weeks.      Electronically signed by Araceli Klein MD on 9/13/2018 at 2:22 PM

## 2018-09-25 ENCOUNTER — OFFICE VISIT (OUTPATIENT)
Dept: INTERNAL MEDICINE CLINIC | Age: 73
End: 2018-09-25
Payer: MEDICARE

## 2018-09-25 ENCOUNTER — TELEPHONE (OUTPATIENT)
Dept: SURGERY | Age: 73
End: 2018-09-25

## 2018-09-25 VITALS
SYSTOLIC BLOOD PRESSURE: 136 MMHG | OXYGEN SATURATION: 97 % | HEART RATE: 80 BPM | DIASTOLIC BLOOD PRESSURE: 70 MMHG | WEIGHT: 133.8 LBS | BODY MASS INDEX: 24.47 KG/M2 | TEMPERATURE: 98 F | RESPIRATION RATE: 18 BRPM

## 2018-09-25 DIAGNOSIS — R21 RASH: Primary | ICD-10-CM

## 2018-09-25 PROCEDURE — G8420 CALC BMI NORM PARAMETERS: HCPCS | Performed by: INTERNAL MEDICINE

## 2018-09-25 PROCEDURE — 99213 OFFICE O/P EST LOW 20 MIN: CPT | Performed by: INTERNAL MEDICINE

## 2018-09-25 PROCEDURE — 1123F ACP DISCUSS/DSCN MKR DOCD: CPT | Performed by: INTERNAL MEDICINE

## 2018-09-25 PROCEDURE — 1090F PRES/ABSN URINE INCON ASSESS: CPT | Performed by: INTERNAL MEDICINE

## 2018-09-25 PROCEDURE — 1101F PT FALLS ASSESS-DOCD LE1/YR: CPT | Performed by: INTERNAL MEDICINE

## 2018-09-25 PROCEDURE — G8427 DOCREV CUR MEDS BY ELIG CLIN: HCPCS | Performed by: INTERNAL MEDICINE

## 2018-09-25 PROCEDURE — G8399 PT W/DXA RESULTS DOCUMENT: HCPCS | Performed by: INTERNAL MEDICINE

## 2018-09-25 PROCEDURE — 1036F TOBACCO NON-USER: CPT | Performed by: INTERNAL MEDICINE

## 2018-09-25 PROCEDURE — 4040F PNEUMOC VAC/ADMIN/RCVD: CPT | Performed by: INTERNAL MEDICINE

## 2018-09-25 PROCEDURE — 3017F COLORECTAL CA SCREEN DOC REV: CPT | Performed by: INTERNAL MEDICINE

## 2018-09-25 RX ORDER — KETOCONAZOLE 20 MG/G
CREAM TOPICAL
Qty: 45 G | Refills: 1 | Status: SHIPPED | OUTPATIENT
Start: 2018-09-25 | End: 2021-02-18

## 2018-09-25 NOTE — TELEPHONE ENCOUNTER
Patient called back. She forgot to mention that when she poured peroxide over the incision yesterday, there were three areas that did bubble up.   If you need to discuss this matter with her please call her at [phone]

## 2018-09-27 ENCOUNTER — OFFICE VISIT (OUTPATIENT)
Dept: SURGERY | Age: 73
End: 2018-09-27

## 2018-09-27 VITALS — SYSTOLIC BLOOD PRESSURE: 146 MMHG | HEART RATE: 93 BPM | TEMPERATURE: 97 F | DIASTOLIC BLOOD PRESSURE: 75 MMHG

## 2018-09-27 DIAGNOSIS — Z90.49 S/P LAPAROSCOPIC CHOLECYSTECTOMY: Primary | ICD-10-CM

## 2018-09-27 PROCEDURE — 99024 POSTOP FOLLOW-UP VISIT: CPT | Performed by: SURGERY

## 2018-10-15 ENCOUNTER — OFFICE VISIT (OUTPATIENT)
Dept: INTERNAL MEDICINE CLINIC | Age: 73
End: 2018-10-15
Payer: MEDICARE

## 2018-10-15 VITALS
RESPIRATION RATE: 16 BRPM | HEART RATE: 82 BPM | BODY MASS INDEX: 23.78 KG/M2 | DIASTOLIC BLOOD PRESSURE: 70 MMHG | WEIGHT: 130 LBS | SYSTOLIC BLOOD PRESSURE: 126 MMHG | OXYGEN SATURATION: 100 %

## 2018-10-15 DIAGNOSIS — K21.9 GASTROESOPHAGEAL REFLUX DISEASE, ESOPHAGITIS PRESENCE NOT SPECIFIED: ICD-10-CM

## 2018-10-15 DIAGNOSIS — M54.2 NECK PAIN: ICD-10-CM

## 2018-10-15 DIAGNOSIS — G25.81 RLS (RESTLESS LEGS SYNDROME): ICD-10-CM

## 2018-10-15 DIAGNOSIS — E78.2 MIXED HYPERLIPIDEMIA: ICD-10-CM

## 2018-10-15 DIAGNOSIS — J01.11 ACUTE RECURRENT FRONTAL SINUSITIS: ICD-10-CM

## 2018-10-15 DIAGNOSIS — I10 ESSENTIAL HYPERTENSION: Primary | ICD-10-CM

## 2018-10-15 DIAGNOSIS — J45.30 MILD PERSISTENT ASTHMA WITHOUT COMPLICATION: ICD-10-CM

## 2018-10-15 DIAGNOSIS — I34.0 MODERATE MITRAL REGURGITATION: ICD-10-CM

## 2018-10-15 PROCEDURE — 1090F PRES/ABSN URINE INCON ASSESS: CPT | Performed by: INTERNAL MEDICINE

## 2018-10-15 PROCEDURE — G8399 PT W/DXA RESULTS DOCUMENT: HCPCS | Performed by: INTERNAL MEDICINE

## 2018-10-15 PROCEDURE — G8427 DOCREV CUR MEDS BY ELIG CLIN: HCPCS | Performed by: INTERNAL MEDICINE

## 2018-10-15 PROCEDURE — 1036F TOBACCO NON-USER: CPT | Performed by: INTERNAL MEDICINE

## 2018-10-15 PROCEDURE — 4040F PNEUMOC VAC/ADMIN/RCVD: CPT | Performed by: INTERNAL MEDICINE

## 2018-10-15 PROCEDURE — 99214 OFFICE O/P EST MOD 30 MIN: CPT | Performed by: INTERNAL MEDICINE

## 2018-10-15 PROCEDURE — 3017F COLORECTAL CA SCREEN DOC REV: CPT | Performed by: INTERNAL MEDICINE

## 2018-10-15 PROCEDURE — 1101F PT FALLS ASSESS-DOCD LE1/YR: CPT | Performed by: INTERNAL MEDICINE

## 2018-10-15 PROCEDURE — G8484 FLU IMMUNIZE NO ADMIN: HCPCS | Performed by: INTERNAL MEDICINE

## 2018-10-15 PROCEDURE — 1123F ACP DISCUSS/DSCN MKR DOCD: CPT | Performed by: INTERNAL MEDICINE

## 2018-10-15 PROCEDURE — G8420 CALC BMI NORM PARAMETERS: HCPCS | Performed by: INTERNAL MEDICINE

## 2018-10-15 RX ORDER — BACLOFEN 5 MG/1
5 TABLET ORAL 2 TIMES DAILY PRN
Qty: 30 TABLET | Refills: 3 | Status: SHIPPED | OUTPATIENT
Start: 2018-10-15 | End: 2019-02-22 | Stop reason: SDUPTHER

## 2018-10-15 RX ORDER — AZITHROMYCIN 250 MG/1
TABLET, FILM COATED ORAL
Qty: 1 PACKET | Refills: 0 | Status: SHIPPED | OUTPATIENT
Start: 2018-10-15 | End: 2018-10-19

## 2018-10-15 RX ORDER — TRAMADOL HYDROCHLORIDE 50 MG/1
50 TABLET ORAL 2 TIMES DAILY PRN
Qty: 60 TABLET | Refills: 2 | Status: SHIPPED | OUTPATIENT
Start: 2018-10-15 | End: 2018-11-26 | Stop reason: SDUPTHER

## 2018-10-15 ASSESSMENT — ENCOUNTER SYMPTOMS
SORE THROAT: 0
RHINORRHEA: 0
DIFFICULTY BREATHING: 0
WHEEZING: 0
SINUS PRESSURE: 1
CHOKING: 0
ORTHOPNEA: 0
SHORTNESS OF BREATH: 0
SPUTUM PRODUCTION: 0
COUGH: 1
BLURRED VISION: 0
SINUS PAIN: 1
SINUS COMPLAINT: 1
BELCHING: 0
CHEST TIGHTNESS: 0
FREQUENT THROAT CLEARING: 0
NAUSEA: 0
HEMOPTYSIS: 0
ABDOMINAL PAIN: 0
HOARSE VOICE: 0
HEARTBURN: 0
GLOBUS SENSATION: 0

## 2018-10-15 ASSESSMENT — PATIENT HEALTH QUESTIONNAIRE - PHQ9
1. LITTLE INTEREST OR PLEASURE IN DOING THINGS: 0
SUM OF ALL RESPONSES TO PHQ QUESTIONS 1-9: 0
2. FEELING DOWN, DEPRESSED OR HOPELESS: 0
SUM OF ALL RESPONSES TO PHQ QUESTIONS 1-9: 0
SUM OF ALL RESPONSES TO PHQ9 QUESTIONS 1 & 2: 0

## 2018-10-15 NOTE — PROGRESS NOTES
improved or maintaining ADL's.;Dose reduction has been attempted. Medication Contracts Existing medication contract.              3 months

## 2018-11-26 DIAGNOSIS — G25.81 RLS (RESTLESS LEGS SYNDROME): ICD-10-CM

## 2018-11-27 RX ORDER — TRAMADOL HYDROCHLORIDE 50 MG/1
50 TABLET ORAL 2 TIMES DAILY PRN
Qty: 60 TABLET | Refills: 2 | Status: SHIPPED | OUTPATIENT
Start: 2018-11-27 | End: 2018-11-30 | Stop reason: SDUPTHER

## 2018-11-30 DIAGNOSIS — G25.81 RLS (RESTLESS LEGS SYNDROME): ICD-10-CM

## 2018-11-30 RX ORDER — LANSOPRAZOLE 30 MG/1
CAPSULE, DELAYED RELEASE ORAL
Qty: 90 CAPSULE | Refills: 3 | Status: SHIPPED | OUTPATIENT
Start: 2018-11-30 | End: 2019-10-15 | Stop reason: SDUPTHER

## 2018-11-30 RX ORDER — TRAMADOL HYDROCHLORIDE 50 MG/1
TABLET ORAL
Qty: 60 TABLET | Refills: 1 | OUTPATIENT
Start: 2018-11-30

## 2018-11-30 RX ORDER — TRAMADOL HYDROCHLORIDE 50 MG/1
50 TABLET ORAL 2 TIMES DAILY PRN
Qty: 60 TABLET | Refills: 1 | Status: SHIPPED | OUTPATIENT
Start: 2018-11-30 | End: 2018-12-30

## 2018-12-03 ENCOUNTER — HOSPITAL ENCOUNTER (OUTPATIENT)
Dept: GENERAL RADIOLOGY | Age: 73
Discharge: HOME | End: 2018-12-03
Payer: MEDICARE

## 2018-12-03 ENCOUNTER — HOSPITAL ENCOUNTER (OUTPATIENT)
Age: 73
Discharge: HOME | End: 2018-12-03
Payer: MEDICARE

## 2018-12-03 DIAGNOSIS — M47.812 OSTEOARTHRITIS OF CERVICAL SPINE, UNSPECIFIED SPINAL OSTEOARTHRITIS COMPLICATION STATUS: ICD-10-CM

## 2018-12-03 DIAGNOSIS — M54.2 NECK PAIN: ICD-10-CM

## 2018-12-03 DIAGNOSIS — M50.30 DDD (DEGENERATIVE DISC DISEASE), CERVICAL: Primary | ICD-10-CM

## 2018-12-03 PROCEDURE — 72040 X-RAY EXAM NECK SPINE 2-3 VW: CPT

## 2018-12-04 ENCOUNTER — HOSPITAL ENCOUNTER (OUTPATIENT)
Dept: PHYSICAL THERAPY | Age: 73
Setting detail: THERAPIES SERIES
Discharge: HOME | End: 2018-12-04
Payer: MEDICARE

## 2018-12-04 PROCEDURE — 97112 NEUROMUSCULAR REEDUCATION: CPT | Performed by: PHYSICAL THERAPIST

## 2018-12-04 PROCEDURE — 97161 PT EVAL LOW COMPLEX 20 MIN: CPT | Performed by: PHYSICAL THERAPIST

## 2018-12-04 PROCEDURE — G8982 BODY POS GOAL STATUS: HCPCS | Performed by: PHYSICAL THERAPIST

## 2018-12-04 PROCEDURE — G8981 BODY POS CURRENT STATUS: HCPCS | Performed by: PHYSICAL THERAPIST

## 2018-12-04 PROCEDURE — 97140 MANUAL THERAPY 1/> REGIONS: CPT | Performed by: PHYSICAL THERAPIST

## 2018-12-04 NOTE — PLAN OF CARE
issues if not already being addressed at this time. Co-morbidities/Complexities (which will affect course of rehabilitation):   []None           Arthritic conditions   []Rheumatoid arthritis (M05.9)  [x]Osteoarthritis (M19.91)   Cardiovascular conditions   [x]Hypertension (I10) (controlled)  []Hyperlipidemia (E78.5)  []Angina pectoris (I20)  []Atherosclerosis (I70)   Musculoskeletal conditions   [x]Disc pathology   []Congenital spine pathologies   []Prior surgical intervention  []Osteoporosis (M81.8)  []Osteopenia (M85.8)   Endocrine conditions   []Hypothyroid (E03.9)  []Hyperthyroid Gastrointestinal conditions   []Constipation (Z05.98)   Metabolic conditions   []Morbid obesity (E66.01)  []Diabetes type 1(E10.65) or 2 (E11.65)   []Neuropathy (G60.9)     Pulmonary conditions   [x]Asthma (J45)  []Coughing   []COPD (J44.9)   Psychological Disorders  []Anxiety (F41.9)  []Depression (F32.9)   []Other:   []Other:          Barriers to/and or personal factors that will affect rehab potential:              [x]Age  []Sex              []Motivation/Lack of Motivation                        [x]Co-Morbidities              []Cognitive Function, education/learning barriers              []Environmental, home barriers              []profession/work barriers  []past PT/medical experience  [x]other:  Justification: Pt is on many different medications and has significant pulmonary problems which may affect her healing and tolerance to some treatments. Falls Risk Assessment (30 days):   [x] Falls Risk assessed and no intervention required.   [] Falls Risk assessed and Patient requires intervention due to being higher risk   TUG score (>12s at risk):     [] Falls education provided, including       G-Codes:  PT G-Codes  Functional Assessment Tool Used: NDI and FABQ  Score: NDI- incomplete ~30% FABQw: 0 FABQpa-10  Functional Limitation: Changing and maintaining body position  Changing and Maintaining Body Position Current Status (): At least 20 percent but less than 40 percent impaired, limited or restricted  Changing and Maintaining Body Position Goal Status (): At least 1 percent but less than 20 percent impaired, limited or restricted    ASSESSMENT:  Pt is a 69 y/o female presenting with diagnosis of cervical OA and DDD from the MD.  Clinically, the pt presents with decreased ROM, decreased strength, decreased function, and increased pain consistent with the MD diagnosis. The pt would benefit from skilled PT to return to PLOF. Pt tolerated session well. Pt demo decreased cervical rotation bilaterally with pain beginning at mid range. Pt symptoms are more chronic in nature with a more recent increase in symptoms. Pt demo tightness in UT and LS. Pt tolerated manual traction well and had symptom relief. Pt was educated on use of homemade peanut for self mobilization at home. Pt was educated on exercises including previously given 4-way isometrics. Pt was educated on benefits. She is understanding.    Functional Impairments:     []Noted cervical/thoracic/GHJ joint hypomobility   []Noted cervical/thoracic/GHJ joint hypermobility   [x]Decreased cervical/UE functional ROM   []Noted Headache pain aggravated by neck movements with/without dizziness   []Abnormal reflexes/sensation/myotomal/dermatomal deficits   [x]Decreased DCF control or ability to hold head up   []Decreased RC/scapular/core strength and neuromuscular control    []Decreased UE functional strength   []other:      Functional Activity Limitations (from functional questionnaire and intake)   [x]Reduced ability to tolerate prolonged functional positions   []Reduced ability or difficulty with changes of positions or transfers between positions   [x]Reduced ability to maintain good posture and demonstrate good body mechanics with sitting, bending, and lifting   [] Reduced ability or tolerance with driving and/or computer work   [x]Reduced ability to perform lifting, reaching, carrying tasks   []Reduced ability to concentrate   []Reduced ability to sleep    []Reduced ability to tolerate any impact through UE or spine   []Reduced ability to ambulate prolonged functional periods/distances   []other:    Participation Restrictions   [x]Reduced participation in self care activities   [x]Reduced participation in home management activities   []Reduced participation in work activities   [x]Reduced participation in social activities. []Reduced participation in sport/recreational activities. Classification/Subgrouping:   []signs/symptoms consistent with neck pain with mobility deficits     [x]signs/symptoms consistent with neck pain with movement coordinated impairments    []signs/symptoms consistent with neck pain with radiating pain    []signs/symptoms consistent with neck pain with headaches (cervicogenic)    []Signs/symptoms consistent with nerve root involvement including myotome & dermatome dysfunction   []sign/symptoms consistent with facet dysfunction of cervical and thoracic spine    []signs/symptoms consistent suggesting central cord compression/UMN syndromes   []signs/symptoms consistent with discogenic cervical pain   []signs/symptoms consistent with rib dysfunction   [x]signs/symptoms consistent with postural dysfunction   []signs/symptoms consistent with shoulder pathology    []signs/symptoms consistent with post-surgical status including decreased ROM, strength and function.    [x]signs/symptoms consistent with pathology which may benefit from Dry Needling   []signs/symptoms which may limit the use of advanced manual therapy techniques: (Hypertension, recent trauma, intolerance to end range positions, prior TIA, visual issues, UE myotomes loss )     Prognosis/Rehab Potential:      []Excellent   [x]Good    []Fair   []Poor    Tolerance of evaluation/treatment:    []Excellent   [x]Good    []Fair   []Poor    PLAN  Frequency/Duration:  1-2 days per week for 4-6

## 2018-12-04 NOTE — FLOWSHEET NOTE
therapy to mobilize proximal hip and LS spine soft tissue/joints for the purpose of modulating pain, promoting relaxation,  increasing ROM, reducing/eliminating soft tissue swelling/inflammation/restriction, improving soft tissue extensibility and allowing for proper ROM for normal function with self care, mobility, lifting and ambulation. Other: Patient education on PT and plan of care including diagnosis, prognosis, treatment goals and options. Patient agrees with discussed POC and treatment and is aware of rehab process. Pt was also educated on clinic layout and use of modalities. PT gave pt business card to call if any questions. Modalities:   declined    Charges:   Timed Code Treatment Minutes: 29'   Total Treatment Minutes: 100'     [x] EVAL (LOW) 69246   [] EVAL (MOD) 50458   [] EVAL (HIGH) 72829   [] RE-EVAL   [] GF(87058) x      [] IONTO  [x] NMR (45887) x  1   [] VASO  [x] Manual (73666) x  1    [] Other:  [] TA x       [] Mech Traction (53241)  [] ES(attended) (79790)      [] ES (un) (36651):     Goals: Patient stated goal: To learn how to control and deal with her neck pain     Therapist goals for Patient:   Short Term Goals: To be achieved in: 2 weeks  1. Independent in HEP and progression per patient tolerance, in order to prevent re-injury. 2. Patient will report pain at worst less than or equal to 3/10 to facilitate improvement in movement, function, and ADLs as indicated by functional deficits.     Long Term Goals: To be achieved in: 6 weeks  1. Disability index score of 10% or less for the NDI to assist with reaching prior level of function. 2. Patient will demonstrate increased AROM of bilateral cervical side bend to 45 and bilateral cervical rotation to 70, cervical flexion to 60 and extension to 60 to allow for proper joint functioning as indicated by patients functional deficits. 3. Patient will return to sewing, and baking  without increased pain or restrictions.    4. Pt PT, DPT [id number]

## 2018-12-06 ENCOUNTER — HOSPITAL ENCOUNTER (OUTPATIENT)
Dept: PHYSICAL THERAPY | Age: 73
Setting detail: THERAPIES SERIES
Discharge: HOME | End: 2018-12-06
Payer: MEDICARE

## 2018-12-06 PROCEDURE — 97112 NEUROMUSCULAR REEDUCATION: CPT | Performed by: PHYSICAL THERAPIST

## 2018-12-06 PROCEDURE — 97140 MANUAL THERAPY 1/> REGIONS: CPT | Performed by: PHYSICAL THERAPIST

## 2018-12-06 PROCEDURE — G8982 BODY POS GOAL STATUS: HCPCS | Performed by: PHYSICAL THERAPIST

## 2018-12-06 PROCEDURE — 97110 THERAPEUTIC EXERCISES: CPT | Performed by: PHYSICAL THERAPIST

## 2018-12-06 PROCEDURE — G8981 BODY POS CURRENT STATUS: HCPCS | Performed by: PHYSICAL THERAPIST

## 2018-12-11 ENCOUNTER — HOSPITAL ENCOUNTER (OUTPATIENT)
Dept: PHYSICAL THERAPY | Age: 73
Setting detail: THERAPIES SERIES
Discharge: HOME | End: 2018-12-11
Payer: MEDICARE

## 2018-12-11 NOTE — FLOWSHEET NOTE
Physical Therapy  Cancellation/No-show Note  Patient Name:  Pippa Grullon  :  1945   Date:  2018  Cancelled visits to date: 01  No-shows to date: 0    For today's appointment patient:  [x]  Cancelled  []  Rescheduled appointment  []  No-show     Reason given by patient:  [x]  Patient ill  []  Conflicting appointment  []  No transportation    []  Conflict with work  []  No reason given  []  Other:     Comments:  Pt cancelled due to being sick. Will call back and reschedule.      Electronically signed by:  Je Mendoza PT

## 2019-01-14 ENCOUNTER — OFFICE VISIT (OUTPATIENT)
Dept: INTERNAL MEDICINE CLINIC | Age: 74
End: 2019-01-14
Payer: MEDICARE

## 2019-01-14 VITALS
SYSTOLIC BLOOD PRESSURE: 126 MMHG | OXYGEN SATURATION: 99 % | BODY MASS INDEX: 23 KG/M2 | HEIGHT: 62 IN | HEART RATE: 76 BPM | RESPIRATION RATE: 16 BRPM | WEIGHT: 125 LBS | DIASTOLIC BLOOD PRESSURE: 70 MMHG

## 2019-01-14 DIAGNOSIS — K21.9 GASTROESOPHAGEAL REFLUX DISEASE, ESOPHAGITIS PRESENCE NOT SPECIFIED: ICD-10-CM

## 2019-01-14 DIAGNOSIS — E78.2 MIXED HYPERLIPIDEMIA: ICD-10-CM

## 2019-01-14 DIAGNOSIS — J45.30 MILD PERSISTENT ASTHMA WITHOUT COMPLICATION: ICD-10-CM

## 2019-01-14 DIAGNOSIS — G25.81 RLS (RESTLESS LEGS SYNDROME): ICD-10-CM

## 2019-01-14 DIAGNOSIS — I34.0 MODERATE MITRAL REGURGITATION: ICD-10-CM

## 2019-01-14 DIAGNOSIS — I10 ESSENTIAL HYPERTENSION: Primary | ICD-10-CM

## 2019-01-14 LAB
A/G RATIO: 1.9 (ref 1.1–2.2)
ALBUMIN SERPL-MCNC: 3.9 G/DL (ref 3.4–5)
ALP BLD-CCNC: 63 U/L (ref 40–129)
ALT SERPL-CCNC: 19 U/L (ref 10–40)
ANION GAP SERPL CALCULATED.3IONS-SCNC: 13 MMOL/L (ref 3–16)
AST SERPL-CCNC: 29 U/L (ref 15–37)
BILIRUB SERPL-MCNC: 0.3 MG/DL (ref 0–1)
BUN BLDV-MCNC: 12 MG/DL (ref 7–20)
CALCIUM SERPL-MCNC: 9.5 MG/DL (ref 8.3–10.6)
CHLORIDE BLD-SCNC: 106 MMOL/L (ref 99–110)
CHOLESTEROL, TOTAL: 189 MG/DL (ref 0–199)
CO2: 25 MMOL/L (ref 21–32)
CREAT SERPL-MCNC: 0.7 MG/DL (ref 0.6–1.2)
GFR AFRICAN AMERICAN: >60
GFR NON-AFRICAN AMERICAN: >60
GLOBULIN: 2.1 G/DL
GLUCOSE BLD-MCNC: 96 MG/DL (ref 70–99)
HDLC SERPL-MCNC: 47 MG/DL (ref 40–60)
LDL CHOLESTEROL CALCULATED: 103 MG/DL
POTASSIUM SERPL-SCNC: 4.5 MMOL/L (ref 3.5–5.1)
SODIUM BLD-SCNC: 144 MMOL/L (ref 136–145)
TOTAL PROTEIN: 6 G/DL (ref 6.4–8.2)
TRIGL SERPL-MCNC: 194 MG/DL (ref 0–150)
VLDLC SERPL CALC-MCNC: 39 MG/DL

## 2019-01-14 PROCEDURE — 4040F PNEUMOC VAC/ADMIN/RCVD: CPT | Performed by: INTERNAL MEDICINE

## 2019-01-14 PROCEDURE — 99214 OFFICE O/P EST MOD 30 MIN: CPT | Performed by: INTERNAL MEDICINE

## 2019-01-14 PROCEDURE — 1036F TOBACCO NON-USER: CPT | Performed by: INTERNAL MEDICINE

## 2019-01-14 PROCEDURE — 3017F COLORECTAL CA SCREEN DOC REV: CPT | Performed by: INTERNAL MEDICINE

## 2019-01-14 PROCEDURE — G8427 DOCREV CUR MEDS BY ELIG CLIN: HCPCS | Performed by: INTERNAL MEDICINE

## 2019-01-14 PROCEDURE — 1101F PT FALLS ASSESS-DOCD LE1/YR: CPT | Performed by: INTERNAL MEDICINE

## 2019-01-14 PROCEDURE — 1090F PRES/ABSN URINE INCON ASSESS: CPT | Performed by: INTERNAL MEDICINE

## 2019-01-14 PROCEDURE — 1123F ACP DISCUSS/DSCN MKR DOCD: CPT | Performed by: INTERNAL MEDICINE

## 2019-01-14 PROCEDURE — 36415 COLL VENOUS BLD VENIPUNCTURE: CPT | Performed by: INTERNAL MEDICINE

## 2019-01-14 PROCEDURE — G8399 PT W/DXA RESULTS DOCUMENT: HCPCS | Performed by: INTERNAL MEDICINE

## 2019-01-14 PROCEDURE — G8484 FLU IMMUNIZE NO ADMIN: HCPCS | Performed by: INTERNAL MEDICINE

## 2019-01-14 PROCEDURE — G8420 CALC BMI NORM PARAMETERS: HCPCS | Performed by: INTERNAL MEDICINE

## 2019-01-14 ASSESSMENT — ENCOUNTER SYMPTOMS
BLURRED VISION: 0
FREQUENT THROAT CLEARING: 0
HEMOPTYSIS: 0
HOARSE VOICE: 0
CHOKING: 0
CHEST TIGHTNESS: 0
ORTHOPNEA: 0
SORE THROAT: 0
NAUSEA: 0
BELCHING: 0
GLOBUS SENSATION: 0
SPUTUM PRODUCTION: 0
SHORTNESS OF BREATH: 0
WHEEZING: 0
ABDOMINAL PAIN: 0
COUGH: 0
DIFFICULTY BREATHING: 0
HEARTBURN: 0

## 2019-01-19 LAB
6-ACETYLMORPHINE: NOT DETECTED
7-AMINOCLONAZEPAM: NOT DETECTED
ALPHA-OH-ALPRAZOLAM: NOT DETECTED
ALPRAZOLAM: NOT DETECTED
AMPHETAMINE: NOT DETECTED
BARBITURATES: NOT DETECTED
BENZOYLECGONINE: NOT DETECTED
BUPRENORPHINE: NOT DETECTED
CARISOPRODOL: NOT DETECTED
CLONAZEPAM: NOT DETECTED
CODEINE: NOT DETECTED
CREATININE URINE: 129.2 MG/DL (ref 20–400)
DIAZEPAM: NOT DETECTED
DRUGS EXPECTED: NORMAL
EER PAIN MGT DRUG PANEL, HIGH RES/EMIT U: NORMAL
ETHYL GLUCURONIDE: NOT DETECTED
FENTANYL: NOT DETECTED
HYDROCODONE: NOT DETECTED
HYDROMORPHONE: NOT DETECTED
LORAZEPAM: NOT DETECTED
MARIJUANA METABOLITE: NOT DETECTED
MDA: NOT DETECTED
MDEA: NOT DETECTED
MDMA URINE: NOT DETECTED
MEPERIDINE: NOT DETECTED
METHADONE: NOT DETECTED
METHAMPHETAMINE: NOT DETECTED
METHYLPHENIDATE: NOT DETECTED
MIDAZOLAM: NOT DETECTED
MORPHINE: NOT DETECTED
NORBUPRENORPHINE, FREE: NOT DETECTED
NORDIAZEPAM: NOT DETECTED
NORFENTANYL: NOT DETECTED
NORHYDROCODONE, URINE: NOT DETECTED
NOROXYCODONE: NOT DETECTED
NOROXYMORPHONE, URINE: NOT DETECTED
OXAZEPAM: NOT DETECTED
OXYCODONE: NOT DETECTED
OXYMORPHONE: NOT DETECTED
PAIN MANAGEMENT DRUG PANEL: NORMAL
PAIN MANAGEMENT DRUG PANEL: NORMAL
PCP: NOT DETECTED
PHENTERMINE: NOT DETECTED
PROPOXYPHENE: NOT DETECTED
TAPENTADOL, URINE: NOT DETECTED
TAPENTADOL-O-SULFATE, URINE: NOT DETECTED
TEMAZEPAM: NOT DETECTED
TRAMADOL: PRESENT
ZOLPIDEM: NOT DETECTED

## 2019-01-28 RX ORDER — ALENDRONATE SODIUM 70 MG/1
TABLET ORAL
Qty: 12 TABLET | Refills: 3 | Status: SHIPPED | OUTPATIENT
Start: 2019-01-28 | End: 2019-07-24

## 2019-02-13 RX ORDER — SIMVASTATIN 20 MG
TABLET ORAL
Qty: 90 TABLET | Refills: 3 | Status: SHIPPED | OUTPATIENT
Start: 2019-02-13 | End: 2020-02-28

## 2019-04-14 ASSESSMENT — ENCOUNTER SYMPTOMS
CHOKING: 0
ABDOMINAL PAIN: 0
BLURRED VISION: 0
SHORTNESS OF BREATH: 0
SORE THROAT: 0
HEMOPTYSIS: 0
NAUSEA: 0
SPUTUM PRODUCTION: 0
DIFFICULTY BREATHING: 0
ORTHOPNEA: 0
HEARTBURN: 0
BELCHING: 0
WHEEZING: 0
CHEST TIGHTNESS: 0
FREQUENT THROAT CLEARING: 0
GLOBUS SENSATION: 0
HOARSE VOICE: 0
COUGH: 0

## 2019-04-14 NOTE — PROGRESS NOTES
Subjective:    Here for recheck of htn, hld,gerd, asthma and rls. Patient ID: Rosa Reddy is a 68 y.o. female. Hypertension   This is a chronic problem. The current episode started more than 1 year ago. The problem is unchanged. The problem is controlled. Pertinent negatives include no blurred vision, chest pain, malaise/fatigue, orthopnea, palpitations, peripheral edema, PND, shortness of breath or sweats. There are no associated agents to hypertension. Risk factors for coronary artery disease include dyslipidemia and post-menopausal state. Past treatments include ACE inhibitors and calcium channel blockers. The current treatment provides significant improvement. There are no compliance problems. Hyperlipidemia   This is a chronic problem. The current episode started more than 1 year ago. The problem is controlled. Recent lipid tests were reviewed and are normal. There are no known factors aggravating her hyperlipidemia. Pertinent negatives include no chest pain, focal sensory loss, focal weakness, leg pain, myalgias or shortness of breath. Current antihyperlipidemic treatment includes statins. The current treatment provides significant improvement of lipids. There are no compliance problems. Risk factors for coronary artery disease include dyslipidemia, hypertension and post-menopausal.   Gastroesophageal Reflux   She reports no abdominal pain, no belching, no chest pain, no choking, no coughing, no dysphagia, no early satiety, no globus sensation, no heartburn, no hoarse voice, no nausea, no sore throat or no wheezing. This is a chronic problem. The current episode started more than 1 year ago. The problem occurs rarely. The problem has been unchanged. Nothing aggravates the symptoms. Pertinent negatives include no anemia, fatigue, melena, muscle weakness, orthopnea or weight loss. There are no known risk factors. She has tried a PPI for the symptoms. The treatment provided significant relief. Past procedures include an EGD. Asthma   There is no chest tightness, cough, difficulty breathing, frequent throat clearing, hemoptysis, hoarse voice, shortness of breath, sputum production or wheezing. This is a chronic problem. The current episode started more than 1 year ago. The problem occurs intermittently (greatly improved on spiriva). The problem has been unchanged ( feeling well currently. ). The cough is productive of sputum. Pertinent negatives include no chest pain, fever, heartburn, malaise/fatigue, myalgias, orthopnea, PND, sore throat, sweats or weight loss. Her symptoms are aggravated by pollen and change in weather. Her symptoms are alleviated by beta-agonist, oral steroids, steroid inhaler and ipratropium (currently on prednisone ). She reports complete improvement on treatment. Her past medical history is significant for asthma and pneumonia. Using tramadol for rls. Completing adls. Good relief. Using 1.5-2 tab daily. Sleeping well. Recent echo with good ef.  70%    Carotid bruits:  Recent doppler normal.      Has been off chronic prednisone for over about 1 yr. Last dexa 2+ yr ago. Pt wondering if she needs to continue fosamax. Getting regular exercise and on ca and vit d. Review of Systems   Constitutional: Negative for fatigue, fever, malaise/fatigue and weight loss. HENT: Negative for hoarse voice and sore throat. Eyes: Negative for blurred vision. Respiratory: Negative for cough, hemoptysis, sputum production, choking, shortness of breath and wheezing. Cardiovascular: Negative for chest pain, palpitations, orthopnea, leg swelling and PND. Gastrointestinal: Negative for abdominal pain, dysphagia, heartburn, melena and nausea. Musculoskeletal: Negative for myalgias and muscle weakness. Neurological: Negative for focal weakness, syncope and light-headedness. Prior to Visit Medications    Medication Sig Taking?  Authorizing Provider   Baclofen 5 MG TABS TAKE ONE TABLET BY MOUTH TWICE A DAY AS NEEDED FOR PAIN Yes MIKY Thornton CNP    MG tablet TAKE ONE TABLET BY MOUTH EVERY 6 HOURS AS NEEDED FOR PAIN Yes MIKY Thornton CNP   simvastatin (ZOCOR) 20 MG tablet TAKE 1 TABLET EVERY NIGHT Yes Tra Morelos MD   alendronate (FOSAMAX) 70 MG tablet TAKE 1 TABLET BY MOUTH ONCE WEEKLY BEFORE BREAKFAST, ON AN EMPTY STOMACH: REMAIN UPRIGHT FOR 30 MINUTES:TAKE WITH 8 OUNCES OF WATER Yes Tra Morelos MD   traMADol (ULTRAM) 50 MG tablet TAKE 1 TABLET TWO TIMES DAILY AS NEEDED FOR PAIN Yes Tra Morelos MD   lansoprazole (PREVACID) 30 MG delayed release capsule TAKE 1 CAPSULE EVERY DAY Yes MIKY Patel CNP   Oxymetazoline HCl (QC NASAL RELIEF SINUS NA) by Nasal route Yes Historical Provider, MD   ketoconazole (NIZORAL) 2 % cream Apply topically daily. Yes Jordi Friend MD   amLODIPine (NORVASC) 5 MG tablet Take 0.5 tablets by mouth daily Yes Tra Morelos MD   benralizumab [facility]) 30 MG/ML SOSY injection Inject 30 mg into the skin Yes Historical Provider, MD   desoximetasone (TOPICORT) 0.25 % cream Apply topically 2 times daily. Yes Tra Morelos MD   mupirocin [facility]) 2 % ointment Apply topically 3 times daily. Yes Tra Morelos MD   Tiotropium Bromide Monohydrate (SPIRIVA RESPIMAT IN) Inhale into the lungs Yes Historical Provider, MD   cromolyn (INTAL) 20 MG/2ML nebulizer solution  Yes Historical Provider, MD   ALVESCO 160 MCG/ACT AERS  Yes Historical Provider, MD   montelukast (SINGULAIR) 10 MG tablet Take 1 tablet by mouth nightly. Yes Tra Morelos MD   azelastine (ASTELIN) 137 MCG/SPRAY nasal spray 2 sprays by Nasal route daily. Use in each nostril as directed Yes Tra Morelos MD   fexofenadine (ALLEGRA) 180 MG tablet Take 1 tablet by mouth daily.  Yes Tra Morelos MD   albuterol (PROVENTIL HFA;VENTOLIN HFA) 108 (90 BASE) MCG/ACT inhaler Inhale 2 puffs into the lungs. AS NEEDED. Yes Historical Provider, MD   Multiple Vitamins-Minerals (CENTRUM SILVER) TABS Take  by mouth. Yes Historical Provider, MD   Misc Natural Products (OSTEO BI-FLEX ADV TRIPLE ST) TABS Take 2 tablets by mouth daily. Yes Historical Provider, MD   Calcium Carbonate-Vit D-Min (CALTRATE 600+D PLUS) 600-400 MG-UNIT TABS Take 2 tablets by mouth daily. Yes Historical Provider, MD       /80 (Site: Left Upper Arm, Position: Sitting, Cuff Size: Medium Adult)   Pulse 76   Resp 16   Ht 5' 2\" (1.575 m)   Wt 122 lb (55.3 kg)   LMP  (Approximate)   BMI 22.31 kg/m²     Objective:   Physical Exam   Constitutional: She appears well-developed and well-nourished. No distress. Neck: Normal range of motion. Neck supple. No JVD present. No thyromegaly present.   + bilat bruit   Cardiovascular: Normal rate and regular rhythm. Exam reveals no gallop and no friction rub. Murmur heard. Systolic murmur is present with a grade of 2/6. Pulmonary/Chest: Effort normal and breath sounds normal. No respiratory distress. She has no wheezes. She has no rhonchi. She has no rales. Musculoskeletal: Normal range of motion. She exhibits no edema. Skin: Skin is warm and dry. She is not diaphoretic. Vitals reviewed. Assessment:      1. Essential hypertension    2. Mixed hyperlipidemia    3. Mild persistent asthma without complication    4. Gastroesophageal reflux disease, esophagitis presence not specified    5. Moderate mitral regurgitation    6. RLS (restless legs syndrome)    7. Age-related osteoporosis without current pathological fracture              Plan:          Yvonne was seen today for hypertension.     Diagnoses and all orders for this visit:    Essential hypertension:  Stable, cont same meds    Mixed hyperlipidemia:  Stable, cont same meds    Mild persistent asthma without complication:  Stable, cont same meds    Gastroesophageal reflux disease, esophagitis presence not specified:  Stable, cont same meds    Moderate mitral regurgitation:  monitor    RLS (restless legs syndrome): Stable, cont same meds    Age-related osteoporosis without current pathological fracture:  Check dexa  -     DEXA BONE DENSITY 2 SITES; Future          Controlled Substances Monitoring:     RX Monitoring 4/15/2019   Attestation The Prescription Monitoring Report for this patient was reviewed today. Chronic Pain Routine Monitoring No signs of potential drug abuse or diversion identified: otherwise, see note documentation   Chronic Pain > 50 MEDD Obtained or confirmened \"Consent of Opioid Use\" on file.    Chronic Pain > 80 MEDD -       3 months

## 2019-04-15 ENCOUNTER — OFFICE VISIT (OUTPATIENT)
Dept: INTERNAL MEDICINE CLINIC | Age: 74
End: 2019-04-15
Payer: MEDICARE

## 2019-04-15 VITALS
BODY MASS INDEX: 22.45 KG/M2 | DIASTOLIC BLOOD PRESSURE: 80 MMHG | RESPIRATION RATE: 16 BRPM | HEART RATE: 76 BPM | SYSTOLIC BLOOD PRESSURE: 126 MMHG | HEIGHT: 62 IN | WEIGHT: 122 LBS

## 2019-04-15 DIAGNOSIS — M81.0 AGE-RELATED OSTEOPOROSIS WITHOUT CURRENT PATHOLOGICAL FRACTURE: ICD-10-CM

## 2019-04-15 DIAGNOSIS — E78.2 MIXED HYPERLIPIDEMIA: ICD-10-CM

## 2019-04-15 DIAGNOSIS — J45.30 MILD PERSISTENT ASTHMA WITHOUT COMPLICATION: ICD-10-CM

## 2019-04-15 DIAGNOSIS — I10 ESSENTIAL HYPERTENSION: Primary | ICD-10-CM

## 2019-04-15 DIAGNOSIS — I34.0 MODERATE MITRAL REGURGITATION: ICD-10-CM

## 2019-04-15 DIAGNOSIS — G25.81 RLS (RESTLESS LEGS SYNDROME): ICD-10-CM

## 2019-04-15 DIAGNOSIS — K21.9 GASTROESOPHAGEAL REFLUX DISEASE, ESOPHAGITIS PRESENCE NOT SPECIFIED: ICD-10-CM

## 2019-04-15 PROCEDURE — 1036F TOBACCO NON-USER: CPT | Performed by: INTERNAL MEDICINE

## 2019-04-15 PROCEDURE — 99214 OFFICE O/P EST MOD 30 MIN: CPT | Performed by: INTERNAL MEDICINE

## 2019-04-15 PROCEDURE — G8420 CALC BMI NORM PARAMETERS: HCPCS | Performed by: INTERNAL MEDICINE

## 2019-04-15 PROCEDURE — 1123F ACP DISCUSS/DSCN MKR DOCD: CPT | Performed by: INTERNAL MEDICINE

## 2019-04-15 PROCEDURE — 4040F PNEUMOC VAC/ADMIN/RCVD: CPT | Performed by: INTERNAL MEDICINE

## 2019-04-15 PROCEDURE — G8399 PT W/DXA RESULTS DOCUMENT: HCPCS | Performed by: INTERNAL MEDICINE

## 2019-04-15 PROCEDURE — 3017F COLORECTAL CA SCREEN DOC REV: CPT | Performed by: INTERNAL MEDICINE

## 2019-04-15 PROCEDURE — 1090F PRES/ABSN URINE INCON ASSESS: CPT | Performed by: INTERNAL MEDICINE

## 2019-04-15 PROCEDURE — G8427 DOCREV CUR MEDS BY ELIG CLIN: HCPCS | Performed by: INTERNAL MEDICINE

## 2019-04-16 DIAGNOSIS — G25.81 RLS (RESTLESS LEGS SYNDROME): ICD-10-CM

## 2019-04-17 RX ORDER — TRAMADOL HYDROCHLORIDE 50 MG/1
50 TABLET ORAL 2 TIMES DAILY PRN
Qty: 180 TABLET | Refills: 0 | Status: SHIPPED | OUTPATIENT
Start: 2019-04-17 | End: 2019-07-16

## 2019-04-22 ENCOUNTER — HOSPITAL ENCOUNTER (OUTPATIENT)
Dept: WOMENS IMAGING | Age: 74
Discharge: HOME | End: 2019-04-22
Payer: MEDICARE

## 2019-04-22 DIAGNOSIS — M81.0 AGE-RELATED OSTEOPOROSIS WITHOUT CURRENT PATHOLOGICAL FRACTURE: ICD-10-CM

## 2019-04-22 PROCEDURE — 77080 DXA BONE DENSITY AXIAL: CPT

## 2019-07-24 ENCOUNTER — OFFICE VISIT (OUTPATIENT)
Dept: INTERNAL MEDICINE CLINIC | Age: 74
End: 2019-07-24
Payer: MEDICARE

## 2019-07-24 VITALS
SYSTOLIC BLOOD PRESSURE: 124 MMHG | DIASTOLIC BLOOD PRESSURE: 82 MMHG | HEIGHT: 62 IN | HEART RATE: 82 BPM | WEIGHT: 122.4 LBS | RESPIRATION RATE: 16 BRPM | OXYGEN SATURATION: 98 % | BODY MASS INDEX: 22.52 KG/M2

## 2019-07-24 DIAGNOSIS — E78.2 MIXED HYPERLIPIDEMIA: ICD-10-CM

## 2019-07-24 DIAGNOSIS — G25.81 RLS (RESTLESS LEGS SYNDROME): ICD-10-CM

## 2019-07-24 DIAGNOSIS — I10 ESSENTIAL HYPERTENSION: Primary | ICD-10-CM

## 2019-07-24 DIAGNOSIS — I07.1 MILD TRICUSPID REGURGITATION: ICD-10-CM

## 2019-07-24 DIAGNOSIS — I34.0 MODERATE MITRAL REGURGITATION: ICD-10-CM

## 2019-07-24 DIAGNOSIS — J45.30 MILD PERSISTENT ASTHMA WITHOUT COMPLICATION: ICD-10-CM

## 2019-07-24 DIAGNOSIS — M85.89 OSTEOPENIA OF MULTIPLE SITES: ICD-10-CM

## 2019-07-24 DIAGNOSIS — K21.9 GASTROESOPHAGEAL REFLUX DISEASE, ESOPHAGITIS PRESENCE NOT SPECIFIED: ICD-10-CM

## 2019-07-24 LAB
A/G RATIO: 2.3 (ref 1.1–2.2)
ALBUMIN SERPL-MCNC: 3.9 G/DL (ref 3.4–5)
ALP BLD-CCNC: 64 U/L (ref 40–129)
ALT SERPL-CCNC: 17 U/L (ref 10–40)
ANION GAP SERPL CALCULATED.3IONS-SCNC: 13 MMOL/L (ref 3–16)
AST SERPL-CCNC: 25 U/L (ref 15–37)
BILIRUB SERPL-MCNC: <0.2 MG/DL (ref 0–1)
BUN BLDV-MCNC: 10 MG/DL (ref 7–20)
CALCIUM SERPL-MCNC: 9.2 MG/DL (ref 8.3–10.6)
CHLORIDE BLD-SCNC: 105 MMOL/L (ref 99–110)
CO2: 25 MMOL/L (ref 21–32)
CREAT SERPL-MCNC: 0.5 MG/DL (ref 0.6–1.2)
GFR AFRICAN AMERICAN: >60
GFR NON-AFRICAN AMERICAN: >60
GLOBULIN: 1.7 G/DL
GLUCOSE BLD-MCNC: 88 MG/DL (ref 70–99)
LDL CHOLESTEROL DIRECT: 108 MG/DL
POTASSIUM SERPL-SCNC: 4.4 MMOL/L (ref 3.5–5.1)
SODIUM BLD-SCNC: 143 MMOL/L (ref 136–145)
TOTAL PROTEIN: 5.6 G/DL (ref 6.4–8.2)

## 2019-07-24 PROCEDURE — G8427 DOCREV CUR MEDS BY ELIG CLIN: HCPCS | Performed by: INTERNAL MEDICINE

## 2019-07-24 PROCEDURE — 1123F ACP DISCUSS/DSCN MKR DOCD: CPT | Performed by: INTERNAL MEDICINE

## 2019-07-24 PROCEDURE — 1090F PRES/ABSN URINE INCON ASSESS: CPT | Performed by: INTERNAL MEDICINE

## 2019-07-24 PROCEDURE — 4040F PNEUMOC VAC/ADMIN/RCVD: CPT | Performed by: INTERNAL MEDICINE

## 2019-07-24 PROCEDURE — 99214 OFFICE O/P EST MOD 30 MIN: CPT | Performed by: INTERNAL MEDICINE

## 2019-07-24 PROCEDURE — 36415 COLL VENOUS BLD VENIPUNCTURE: CPT | Performed by: INTERNAL MEDICINE

## 2019-07-24 PROCEDURE — G8420 CALC BMI NORM PARAMETERS: HCPCS | Performed by: INTERNAL MEDICINE

## 2019-07-24 PROCEDURE — 3017F COLORECTAL CA SCREEN DOC REV: CPT | Performed by: INTERNAL MEDICINE

## 2019-07-24 PROCEDURE — 1036F TOBACCO NON-USER: CPT | Performed by: INTERNAL MEDICINE

## 2019-07-24 PROCEDURE — G8399 PT W/DXA RESULTS DOCUMENT: HCPCS | Performed by: INTERNAL MEDICINE

## 2019-07-24 RX ORDER — AMLODIPINE BESYLATE 2.5 MG/1
2.5 TABLET ORAL DAILY
Qty: 90 TABLET | Refills: 3 | Status: SHIPPED | OUTPATIENT
Start: 2019-07-24 | End: 2020-04-18 | Stop reason: SDUPTHER

## 2019-07-24 ASSESSMENT — PATIENT HEALTH QUESTIONNAIRE - PHQ9
SUM OF ALL RESPONSES TO PHQ QUESTIONS 1-9: 0
SUM OF ALL RESPONSES TO PHQ QUESTIONS 1-9: 0
2. FEELING DOWN, DEPRESSED OR HOPELESS: 0
1. LITTLE INTEREST OR PLEASURE IN DOING THINGS: 0
SUM OF ALL RESPONSES TO PHQ9 QUESTIONS 1 & 2: 0

## 2019-07-24 ASSESSMENT — ENCOUNTER SYMPTOMS
GLOBUS SENSATION: 0
DIFFICULTY BREATHING: 0
SHORTNESS OF BREATH: 0
ABDOMINAL PAIN: 0
WHEEZING: 0
CHEST TIGHTNESS: 0
BLURRED VISION: 0
ORTHOPNEA: 0
NAUSEA: 0
CHOKING: 0
COUGH: 0
HOARSE VOICE: 0
SPUTUM PRODUCTION: 0
HEMOPTYSIS: 0
BELCHING: 0
FREQUENT THROAT CLEARING: 0
SORE THROAT: 0
HEARTBURN: 0

## 2019-08-05 ENCOUNTER — HOSPITAL ENCOUNTER (OUTPATIENT)
Dept: PHYSICAL THERAPY | Age: 74
Setting detail: THERAPIES SERIES
Discharge: HOME | End: 2019-08-05

## 2019-08-10 ENCOUNTER — HOSPITAL ENCOUNTER (OUTPATIENT)
Dept: NON INVASIVE DIAGNOSTICS | Age: 74
Discharge: HOME | End: 2019-08-10
Payer: MEDICARE

## 2019-08-10 DIAGNOSIS — I07.1 MILD TRICUSPID REGURGITATION: ICD-10-CM

## 2019-08-10 DIAGNOSIS — I34.0 MODERATE MITRAL REGURGITATION: ICD-10-CM

## 2019-08-10 LAB
LV EF: 63 %
LVEF MODALITY: NORMAL

## 2019-08-10 PROCEDURE — 93306 TTE W/DOPPLER COMPLETE: CPT

## 2019-09-16 DIAGNOSIS — G25.81 RLS (RESTLESS LEGS SYNDROME): Primary | ICD-10-CM

## 2019-09-16 NOTE — TELEPHONE ENCOUNTER
LAST OFFICE VISIT :07/24/2019      Future Appointments   Date Time Provider Lex Mcdonnell   10/30/2019  9:45 AM M Leyla Montejo MD [facility]

## 2019-09-17 RX ORDER — TRAMADOL HYDROCHLORIDE 50 MG/1
TABLET ORAL
Qty: 60 TABLET | Refills: 1 | Status: SHIPPED | OUTPATIENT
Start: 2019-09-17 | End: 2019-10-17

## 2019-10-15 RX ORDER — LANSOPRAZOLE 30 MG/1
CAPSULE, DELAYED RELEASE ORAL
Qty: 90 CAPSULE | Refills: 3 | Status: SHIPPED | OUTPATIENT
Start: 2019-10-15 | End: 2020-09-09

## 2019-10-30 ENCOUNTER — OFFICE VISIT (OUTPATIENT)
Dept: INTERNAL MEDICINE CLINIC | Age: 74
End: 2019-10-30
Payer: MEDICARE

## 2019-10-30 VITALS
WEIGHT: 124 LBS | SYSTOLIC BLOOD PRESSURE: 138 MMHG | RESPIRATION RATE: 16 BRPM | HEART RATE: 84 BPM | BODY MASS INDEX: 22.82 KG/M2 | DIASTOLIC BLOOD PRESSURE: 70 MMHG | OXYGEN SATURATION: 98 % | HEIGHT: 62 IN

## 2019-10-30 DIAGNOSIS — M79.642 PAIN OF LEFT HAND: ICD-10-CM

## 2019-10-30 DIAGNOSIS — I34.0 MODERATE MITRAL REGURGITATION: ICD-10-CM

## 2019-10-30 DIAGNOSIS — I07.1 MILD TRICUSPID REGURGITATION: ICD-10-CM

## 2019-10-30 DIAGNOSIS — K21.9 GASTROESOPHAGEAL REFLUX DISEASE, ESOPHAGITIS PRESENCE NOT SPECIFIED: ICD-10-CM

## 2019-10-30 DIAGNOSIS — Z12.39 BREAST CANCER SCREENING: ICD-10-CM

## 2019-10-30 DIAGNOSIS — M85.89 OSTEOPENIA OF MULTIPLE SITES: ICD-10-CM

## 2019-10-30 DIAGNOSIS — I10 ESSENTIAL HYPERTENSION: Primary | ICD-10-CM

## 2019-10-30 DIAGNOSIS — J45.30 MILD PERSISTENT ASTHMA WITHOUT COMPLICATION: ICD-10-CM

## 2019-10-30 DIAGNOSIS — G25.81 RLS (RESTLESS LEGS SYNDROME): ICD-10-CM

## 2019-10-30 DIAGNOSIS — E78.2 MIXED HYPERLIPIDEMIA: ICD-10-CM

## 2019-10-30 PROCEDURE — 4040F PNEUMOC VAC/ADMIN/RCVD: CPT | Performed by: INTERNAL MEDICINE

## 2019-10-30 PROCEDURE — G8484 FLU IMMUNIZE NO ADMIN: HCPCS | Performed by: INTERNAL MEDICINE

## 2019-10-30 PROCEDURE — 1036F TOBACCO NON-USER: CPT | Performed by: INTERNAL MEDICINE

## 2019-10-30 PROCEDURE — 3017F COLORECTAL CA SCREEN DOC REV: CPT | Performed by: INTERNAL MEDICINE

## 2019-10-30 PROCEDURE — G8427 DOCREV CUR MEDS BY ELIG CLIN: HCPCS | Performed by: INTERNAL MEDICINE

## 2019-10-30 PROCEDURE — 99214 OFFICE O/P EST MOD 30 MIN: CPT | Performed by: INTERNAL MEDICINE

## 2019-10-30 PROCEDURE — G8399 PT W/DXA RESULTS DOCUMENT: HCPCS | Performed by: INTERNAL MEDICINE

## 2019-10-30 PROCEDURE — 1090F PRES/ABSN URINE INCON ASSESS: CPT | Performed by: INTERNAL MEDICINE

## 2019-10-30 PROCEDURE — G8420 CALC BMI NORM PARAMETERS: HCPCS | Performed by: INTERNAL MEDICINE

## 2019-10-30 PROCEDURE — 1123F ACP DISCUSS/DSCN MKR DOCD: CPT | Performed by: INTERNAL MEDICINE

## 2019-10-30 ASSESSMENT — ENCOUNTER SYMPTOMS
SHORTNESS OF BREATH: 0
SPUTUM PRODUCTION: 0
WHEEZING: 0
HEARTBURN: 0
GLOBUS SENSATION: 0
BELCHING: 0
CHEST TIGHTNESS: 0
HOARSE VOICE: 0
COUGH: 0
HEMOPTYSIS: 0
ORTHOPNEA: 0
CHOKING: 0
DIFFICULTY BREATHING: 0
SORE THROAT: 0
NAUSEA: 0
BLURRED VISION: 0
FREQUENT THROAT CLEARING: 0
ABDOMINAL PAIN: 0

## 2019-11-01 ENCOUNTER — OFFICE VISIT (OUTPATIENT)
Dept: ORTHOPEDIC SURGERY | Age: 74
End: 2019-11-01
Payer: MEDICARE

## 2019-11-01 VITALS
BODY MASS INDEX: 22.82 KG/M2 | HEART RATE: 92 BPM | DIASTOLIC BLOOD PRESSURE: 80 MMHG | RESPIRATION RATE: 16 BRPM | HEIGHT: 62 IN | WEIGHT: 124 LBS | SYSTOLIC BLOOD PRESSURE: 150 MMHG

## 2019-11-01 DIAGNOSIS — M79.645 THUMB PAIN, LEFT: Primary | ICD-10-CM

## 2019-11-01 DIAGNOSIS — M19.042 OSTEOARTHRITIS OF FINGER OF LEFT HAND: ICD-10-CM

## 2019-11-01 PROCEDURE — G8427 DOCREV CUR MEDS BY ELIG CLIN: HCPCS | Performed by: ORTHOPAEDIC SURGERY

## 2019-11-01 PROCEDURE — 99203 OFFICE O/P NEW LOW 30 MIN: CPT | Performed by: ORTHOPAEDIC SURGERY

## 2019-11-01 PROCEDURE — G8484 FLU IMMUNIZE NO ADMIN: HCPCS | Performed by: ORTHOPAEDIC SURGERY

## 2019-11-01 PROCEDURE — 20600 DRAIN/INJ JOINT/BURSA W/O US: CPT | Performed by: ORTHOPAEDIC SURGERY

## 2019-11-01 PROCEDURE — G8420 CALC BMI NORM PARAMETERS: HCPCS | Performed by: ORTHOPAEDIC SURGERY

## 2019-11-01 PROCEDURE — 1090F PRES/ABSN URINE INCON ASSESS: CPT | Performed by: ORTHOPAEDIC SURGERY

## 2019-11-05 DIAGNOSIS — G25.81 RLS (RESTLESS LEGS SYNDROME): Primary | ICD-10-CM

## 2019-11-05 RX ORDER — TRAMADOL HYDROCHLORIDE 50 MG/1
TABLET ORAL
Qty: 60 TABLET | Refills: 2 | Status: SHIPPED | OUTPATIENT
Start: 2019-11-05 | End: 2019-12-01 | Stop reason: SDUPTHER

## 2019-11-28 DIAGNOSIS — G25.81 RLS (RESTLESS LEGS SYNDROME): ICD-10-CM

## 2019-12-01 DIAGNOSIS — G25.81 RLS (RESTLESS LEGS SYNDROME): ICD-10-CM

## 2019-12-02 RX ORDER — TRAMADOL HYDROCHLORIDE 50 MG/1
50 TABLET ORAL 2 TIMES DAILY PRN
Qty: 60 TABLET | Refills: 0 | Status: SHIPPED | OUTPATIENT
Start: 2019-12-02 | End: 2019-12-02

## 2019-12-02 RX ORDER — TRAMADOL HYDROCHLORIDE 50 MG/1
50 TABLET ORAL 2 TIMES DAILY PRN
Qty: 60 TABLET | Refills: 0 | Status: SHIPPED | OUTPATIENT
Start: 2019-12-02 | End: 2019-12-26

## 2019-12-19 ENCOUNTER — TELEPHONE (OUTPATIENT)
Dept: INTERNAL MEDICINE CLINIC | Age: 74
End: 2019-12-19

## 2019-12-25 DIAGNOSIS — G25.81 RLS (RESTLESS LEGS SYNDROME): ICD-10-CM

## 2019-12-26 RX ORDER — TRAMADOL HYDROCHLORIDE 50 MG/1
50 TABLET ORAL 2 TIMES DAILY
Qty: 60 TABLET | Refills: 1 | Status: SHIPPED | OUTPATIENT
Start: 2019-12-26 | End: 2020-02-12 | Stop reason: SDUPTHER

## 2020-01-10 RX ORDER — IBUPROFEN 600 MG/1
TABLET ORAL
Qty: 120 TABLET | Refills: 3 | Status: SHIPPED | OUTPATIENT
Start: 2020-01-10 | End: 2021-02-17

## 2020-02-10 ENCOUNTER — OFFICE VISIT (OUTPATIENT)
Dept: INTERNAL MEDICINE CLINIC | Age: 75
End: 2020-02-10
Payer: MEDICARE

## 2020-02-10 VITALS
SYSTOLIC BLOOD PRESSURE: 138 MMHG | HEART RATE: 91 BPM | RESPIRATION RATE: 16 BRPM | HEIGHT: 62 IN | DIASTOLIC BLOOD PRESSURE: 70 MMHG | WEIGHT: 125 LBS | OXYGEN SATURATION: 99 % | BODY MASS INDEX: 23 KG/M2

## 2020-02-10 PROCEDURE — 99213 OFFICE O/P EST LOW 20 MIN: CPT | Performed by: INTERNAL MEDICINE

## 2020-02-10 PROCEDURE — 1090F PRES/ABSN URINE INCON ASSESS: CPT | Performed by: INTERNAL MEDICINE

## 2020-02-10 PROCEDURE — G8399 PT W/DXA RESULTS DOCUMENT: HCPCS | Performed by: INTERNAL MEDICINE

## 2020-02-10 PROCEDURE — 4040F PNEUMOC VAC/ADMIN/RCVD: CPT | Performed by: INTERNAL MEDICINE

## 2020-02-10 PROCEDURE — G8420 CALC BMI NORM PARAMETERS: HCPCS | Performed by: INTERNAL MEDICINE

## 2020-02-10 PROCEDURE — 3017F COLORECTAL CA SCREEN DOC REV: CPT | Performed by: INTERNAL MEDICINE

## 2020-02-10 PROCEDURE — G8427 DOCREV CUR MEDS BY ELIG CLIN: HCPCS | Performed by: INTERNAL MEDICINE

## 2020-02-10 PROCEDURE — 1036F TOBACCO NON-USER: CPT | Performed by: INTERNAL MEDICINE

## 2020-02-10 PROCEDURE — 1123F ACP DISCUSS/DSCN MKR DOCD: CPT | Performed by: INTERNAL MEDICINE

## 2020-02-10 PROCEDURE — G8484 FLU IMMUNIZE NO ADMIN: HCPCS | Performed by: INTERNAL MEDICINE

## 2020-02-10 RX ORDER — LEVOCETIRIZINE DIHYDROCHLORIDE 5 MG/1
5 TABLET, FILM COATED ORAL NIGHTLY
COMMUNITY
End: 2020-02-26

## 2020-02-10 SDOH — ECONOMIC STABILITY: TRANSPORTATION INSECURITY
IN THE PAST 12 MONTHS, HAS LACK OF TRANSPORTATION KEPT YOU FROM MEETINGS, WORK, OR FROM GETTING THINGS NEEDED FOR DAILY LIVING?: NO

## 2020-02-10 SDOH — ECONOMIC STABILITY: FOOD INSECURITY: WITHIN THE PAST 12 MONTHS, YOU WORRIED THAT YOUR FOOD WOULD RUN OUT BEFORE YOU GOT MONEY TO BUY MORE.: NEVER TRUE

## 2020-02-10 SDOH — ECONOMIC STABILITY: INCOME INSECURITY: HOW HARD IS IT FOR YOU TO PAY FOR THE VERY BASICS LIKE FOOD, HOUSING, MEDICAL CARE, AND HEATING?: NOT HARD AT ALL

## 2020-02-10 SDOH — ECONOMIC STABILITY: TRANSPORTATION INSECURITY
IN THE PAST 12 MONTHS, HAS THE LACK OF TRANSPORTATION KEPT YOU FROM MEDICAL APPOINTMENTS OR FROM GETTING MEDICATIONS?: NO

## 2020-02-10 SDOH — ECONOMIC STABILITY: FOOD INSECURITY: WITHIN THE PAST 12 MONTHS, THE FOOD YOU BOUGHT JUST DIDN'T LAST AND YOU DIDN'T HAVE MONEY TO GET MORE.: NEVER TRUE

## 2020-02-10 ASSESSMENT — ENCOUNTER SYMPTOMS
GLOBUS SENSATION: 0
HEMOPTYSIS: 0
BELCHING: 0
ORTHOPNEA: 0
COUGH: 0
SORE THROAT: 0
BLURRED VISION: 0
DIFFICULTY BREATHING: 0
WHEEZING: 0
SPUTUM PRODUCTION: 0
HEARTBURN: 0
NAUSEA: 0
HOARSE VOICE: 0
SHORTNESS OF BREATH: 0
CHOKING: 0
FREQUENT THROAT CLEARING: 0
CHEST TIGHTNESS: 0
ABDOMINAL PAIN: 0

## 2020-02-10 ASSESSMENT — PATIENT HEALTH QUESTIONNAIRE - PHQ9
1. LITTLE INTEREST OR PLEASURE IN DOING THINGS: 0
2. FEELING DOWN, DEPRESSED OR HOPELESS: 0
SUM OF ALL RESPONSES TO PHQ QUESTIONS 1-9: 0
SUM OF ALL RESPONSES TO PHQ QUESTIONS 1-9: 0
SUM OF ALL RESPONSES TO PHQ9 QUESTIONS 1 & 2: 0

## 2020-02-10 NOTE — PROGRESS NOTES
Sitting, Cuff Size: Medium Adult)   Pulse 91   Resp 16   Ht 5' 2\" (1.575 m)   Wt 125 lb (56.7 kg)   LMP  (Approximate)   SpO2 99%   BMI 22.86 kg/m²     Objective:   Physical Exam   Constitutional: She appears well-developed and well-nourished. No distress. Neck: Normal range of motion. Neck supple. No JVD present. No thyromegaly present.   + bilat bruit   Cardiovascular: Normal rate and regular rhythm. Exam reveals no gallop and no friction rub. Murmur heard. Systolic murmur is present with a grade of 2/6. Pulmonary/Chest: Effort normal and breath sounds normal. No respiratory distress. She has no wheezes. She has no rhonchi. She has no rales. Musculoskeletal:         General: No edema. Skin: Skin is warm and dry. She is not diaphoretic. Vitals reviewed. Assessment:      1. Essential hypertension    2. Mixed hyperlipidemia    3. Gastroesophageal reflux disease, esophagitis presence not specified    4. Mild persistent asthma without complication    5. RLS (restless legs syndrome)    6. Moderate mitral regurgitation    7. Mild tricuspid regurgitation    8. Osteopenia of multiple sites              Plan:      Stevie Matt was seen today for hypertension. Diagnoses and all orders for this visit:    Essential hypertension:  Stable, cont same meds  -     Cancel: Comprehensive Metabolic Panel; Future    Mixed hyperlipidemia:  Stable, cont same meds  -     Cancel: Lipid Panel; Future  -     Cancel: Comprehensive Metabolic Panel; Future    Gastroesophageal reflux disease, esophagitis presence not specified:  Stable, cont same meds  -     Cancel: Comprehensive Metabolic Panel; Future    Mild persistent asthma without complication:  Stable, cont same meds    RLS (restless legs syndrome):   Stable, cont same meds    Moderate mitral regurgitation:  Monitor echo    Mild tricuspid regurgitation:  As above    Osteopenia of multiple sites:  Stable, cont same meds      Controlled Substance Monitoring:    Acute and Chronic Pain Monitoring:   RX Monitoring 2/10/2020   Attestation -   Periodic Controlled Substance Monitoring No signs of potential drug abuse or diversion identified. Chronic Pain > 50 MEDD Obtained or confirmed \"Consent for Opioid Use\" on file.    Chronic Pain > 80 MEDD -             3 months

## 2020-02-12 RX ORDER — TRAMADOL HYDROCHLORIDE 50 MG/1
50 TABLET ORAL 2 TIMES DAILY
Qty: 60 TABLET | Refills: 1 | Status: SHIPPED | OUTPATIENT
Start: 2020-02-12 | End: 2020-04-15

## 2020-02-17 ENCOUNTER — OFFICE VISIT (OUTPATIENT)
Dept: INTERNAL MEDICINE CLINIC | Age: 75
End: 2020-02-17
Payer: MEDICARE

## 2020-02-17 VITALS
DIASTOLIC BLOOD PRESSURE: 80 MMHG | RESPIRATION RATE: 18 BRPM | SYSTOLIC BLOOD PRESSURE: 138 MMHG | TEMPERATURE: 97.8 F | OXYGEN SATURATION: 99 % | HEART RATE: 99 BPM

## 2020-02-17 PROCEDURE — 4040F PNEUMOC VAC/ADMIN/RCVD: CPT | Performed by: INTERNAL MEDICINE

## 2020-02-17 PROCEDURE — G8484 FLU IMMUNIZE NO ADMIN: HCPCS | Performed by: INTERNAL MEDICINE

## 2020-02-17 PROCEDURE — 1123F ACP DISCUSS/DSCN MKR DOCD: CPT | Performed by: INTERNAL MEDICINE

## 2020-02-17 PROCEDURE — 99212 OFFICE O/P EST SF 10 MIN: CPT | Performed by: INTERNAL MEDICINE

## 2020-02-17 PROCEDURE — 1036F TOBACCO NON-USER: CPT | Performed by: INTERNAL MEDICINE

## 2020-02-17 PROCEDURE — G8427 DOCREV CUR MEDS BY ELIG CLIN: HCPCS | Performed by: INTERNAL MEDICINE

## 2020-02-17 PROCEDURE — 3017F COLORECTAL CA SCREEN DOC REV: CPT | Performed by: INTERNAL MEDICINE

## 2020-02-17 PROCEDURE — 1090F PRES/ABSN URINE INCON ASSESS: CPT | Performed by: INTERNAL MEDICINE

## 2020-02-17 PROCEDURE — G8399 PT W/DXA RESULTS DOCUMENT: HCPCS | Performed by: INTERNAL MEDICINE

## 2020-02-17 PROCEDURE — G8420 CALC BMI NORM PARAMETERS: HCPCS | Performed by: INTERNAL MEDICINE

## 2020-02-17 RX ORDER — PREDNISONE 20 MG/1
TABLET ORAL
Qty: 15 TABLET | Refills: 0 | Status: SHIPPED | OUTPATIENT
Start: 2020-02-17 | End: 2020-09-02 | Stop reason: ALTCHOICE

## 2020-02-17 RX ORDER — HYDROXYZINE HYDROCHLORIDE 10 MG/1
10 TABLET, FILM COATED ORAL 3 TIMES DAILY PRN
Qty: 30 TABLET | Refills: 1 | Status: SHIPPED | OUTPATIENT
Start: 2020-02-17 | End: 2020-04-20

## 2020-02-17 ASSESSMENT — ENCOUNTER SYMPTOMS
VOMITING: 0
SHORTNESS OF BREATH: 0
COUGH: 0
NAUSEA: 0

## 2020-02-17 NOTE — PROGRESS NOTES
Subjective:    cc:  rash  Patient ID: Jl Puckett is a 76 y.o. female. HPI  C/o rash on chest. X 10 d. Waxing and waning. No f/c/n/v/abd pain.  +++ itching. Using aspercream.    Review of Systems   Constitutional: Negative for chills, diaphoresis, fever and unexpected weight change. Respiratory: Negative for cough and shortness of breath. Cardiovascular: Negative for chest pain, palpitations and leg swelling. Gastrointestinal: Negative for nausea and vomiting. Skin: Positive for rash. Neurological: Negative for syncope and light-headedness. Prior to Visit Medications    Medication Sig Taking? Authorizing Provider   traMADol (ULTRAM) 50 MG tablet Take 1 tablet by mouth 2 times daily for 30 days. Yes Silvino Swain MD   levocetirizine (XYZAL) 5 MG tablet Take 5 mg by mouth nightly Yes Historical Provider, MD   ibuprofen (ADVIL;MOTRIN) 600 MG tablet TAKE ONE TABLET BY MOUTH EVERY 6 HOURS AS NEEDED FOR PAIN Yes Silvino Swain MD   lansoprazole (PREVACID) 30 MG delayed release capsule TAKE 1 CAPSULE EVERY DAY Yes Silvino Swain MD   amLODIPine (NORVASC) 2.5 MG tablet Take 1 tablet by mouth daily Yes Silvino Swain MD   simvastatin (ZOCOR) 20 MG tablet TAKE 1 TABLET EVERY NIGHT Yes Silvino Swain MD   Oxymetazoline HCl (QC NASAL RELIEF SINUS NA) by Nasal route Yes Historical Provider, MD   ketoconazole (NIZORAL) 2 % cream Apply topically daily. Yes Misha Mejia MD   desoximetasone (TOPICORT) 0.25 % cream Apply topically 2 times daily. Yes Silvino Swain MD   mupirocin [facility]) 2 % ointment Apply topically 3 times daily. Yes Silvino Swain MD   Tiotropium Bromide Monohydrate (SPIRIVA RESPIMAT IN) Inhale into the lungs Yes Historical Provider, MD   cromolyn (INTAL) 20 MG/2ML nebulizer solution  Yes Historical Provider, MD   ALVESCO 160 MCG/ACT AERS  Yes Historical Provider, MD   montelukast (SINGULAIR) 10 MG tablet Take 1 tablet by mouth nightly.  Yes José Luis Garrett MD [place]   azelastine (ASTELIN) 137 MCG/SPRAY nasal spray 2 sprays by Nasal route daily. Use in each nostril as directed Yes Silvino Swain MD   albuterol (PROVENTIL HFA;VENTOLIN HFA) 108 (90 BASE) MCG/ACT inhaler Inhale 2 puffs into the lungs. AS NEEDED. Yes Historical Provider, MD   Multiple Vitamins-Minerals (CENTRUM SILVER) TABS Take  by mouth. Yes Historical Provider, MD   Misc Natural Products (OSTEO BI-FLEX ADV TRIPLE ST) TABS Take 2 tablets by mouth daily. Yes Historical Provider, MD   Calcium Carbonate-Vit D-Min (CALTRATE 600+D PLUS) 600-400 MG-UNIT TABS Take 2 tablets by mouth daily. Yes Historical Provider, MD     /80 (Site: Left Upper Arm, Position: Sitting, Cuff Size: Medium Adult)   Pulse 99   Temp 97.8 °F (36.6 °C) (Oral)   Resp 18   LMP  (Approximate)   SpO2 99%   Allergies   Allergen Reactions    Cinnamon     Eggs [Egg White]     Mustard [Allyl Isothiocyanate]     Naprosyn [Naproxen]     Other      TREE NUTS, PEANUTS    Pcn [Penicillins]     Red Dye      rash    Soy Allergy     Tylenol [Acetaminophen]        Objective:   Physical Exam  Constitutional:       Appearance: Normal appearance. She is not ill-appearing. Skin:     General: Skin is warm and dry. Findings: Erythema and rash present. Comments: Erythematous papular rash over anterior chest.  No d/c. Nontender. Neurological:      Mental Status: She is alert. Assessment:      1. Rash            Plan:      Mario Sanchez was seen today for other. Diagnoses and all orders for this visit:    Rash  -     predniSONE (DELTASONE) 20 MG tablet; 1 po bid x 5 d 1 po qd x 5 d with food  -     hydrOXYzine (ATARAX) 10 MG tablet;  Take 1 tablet by mouth 3 times daily as needed for Jeanette Stock MD

## 2020-02-23 ENCOUNTER — PATIENT MESSAGE (OUTPATIENT)
Dept: INTERNAL MEDICINE CLINIC | Age: 75
End: 2020-02-23

## 2020-02-24 NOTE — TELEPHONE ENCOUNTER
From: Yvonne Carcamo  To: Camille Piper MD  Sent: 2/23/2020 4:47 PM EST  Subject: Visit Follow-Up Question    For several days I have had periods of my pulse racing and my heart pounding, but my BP is OK - 135 / 70. Today is has not stopped (now 4:45pm). What should I do?

## 2020-02-26 ENCOUNTER — OFFICE VISIT (OUTPATIENT)
Dept: INTERNAL MEDICINE CLINIC | Age: 75
End: 2020-02-26
Payer: MEDICARE

## 2020-02-26 VITALS
OXYGEN SATURATION: 99 % | BODY MASS INDEX: 23 KG/M2 | HEIGHT: 62 IN | DIASTOLIC BLOOD PRESSURE: 70 MMHG | SYSTOLIC BLOOD PRESSURE: 144 MMHG | HEART RATE: 84 BPM | RESPIRATION RATE: 16 BRPM | WEIGHT: 125 LBS

## 2020-02-26 LAB
A/G RATIO: 2.1 (ref 1.1–2.2)
ALBUMIN SERPL-MCNC: 4 G/DL (ref 3.4–5)
ALP BLD-CCNC: 57 U/L (ref 40–129)
ALT SERPL-CCNC: 40 U/L (ref 10–40)
ANION GAP SERPL CALCULATED.3IONS-SCNC: 12 MMOL/L (ref 3–16)
AST SERPL-CCNC: 22 U/L (ref 15–37)
BASOPHILS ABSOLUTE: 0 K/UL (ref 0–0.2)
BASOPHILS RELATIVE PERCENT: 0.2 %
BILIRUB SERPL-MCNC: 0.4 MG/DL (ref 0–1)
BUN BLDV-MCNC: 13 MG/DL (ref 7–20)
CALCIUM SERPL-MCNC: 8.8 MG/DL (ref 8.3–10.6)
CHLORIDE BLD-SCNC: 100 MMOL/L (ref 99–110)
CO2: 26 MMOL/L (ref 21–32)
CREAT SERPL-MCNC: 0.6 MG/DL (ref 0.6–1.2)
EOSINOPHILS ABSOLUTE: 0 K/UL (ref 0–0.6)
EOSINOPHILS RELATIVE PERCENT: 0 %
GFR AFRICAN AMERICAN: >60
GFR NON-AFRICAN AMERICAN: >60
GLOBULIN: 1.9 G/DL
GLUCOSE BLD-MCNC: 87 MG/DL (ref 70–99)
HCT VFR BLD CALC: 39.7 % (ref 36–48)
HEMOGLOBIN: 13.1 G/DL (ref 12–16)
LDL CHOLESTEROL DIRECT: 161 MG/DL
LYMPHOCYTES ABSOLUTE: 5 K/UL (ref 1–5.1)
LYMPHOCYTES RELATIVE PERCENT: 38.7 %
MCH RBC QN AUTO: 29.6 PG (ref 26–34)
MCHC RBC AUTO-ENTMCNC: 33 G/DL (ref 31–36)
MCV RBC AUTO: 89.7 FL (ref 80–100)
MONOCYTES ABSOLUTE: 1 K/UL (ref 0–1.3)
MONOCYTES RELATIVE PERCENT: 7.8 %
NEUTROPHILS ABSOLUTE: 6.9 K/UL (ref 1.7–7.7)
NEUTROPHILS RELATIVE PERCENT: 53.3 %
PDW BLD-RTO: 14.3 % (ref 12.4–15.4)
PLATELET # BLD: 337 K/UL (ref 135–450)
PMV BLD AUTO: 8.7 FL (ref 5–10.5)
POTASSIUM SERPL-SCNC: 4 MMOL/L (ref 3.5–5.1)
RBC # BLD: 4.42 M/UL (ref 4–5.2)
SODIUM BLD-SCNC: 138 MMOL/L (ref 136–145)
TOTAL PROTEIN: 5.9 G/DL (ref 6.4–8.2)
TSH REFLEX: 1.94 UIU/ML (ref 0.27–4.2)
WBC # BLD: 13 K/UL (ref 4–11)

## 2020-02-26 PROCEDURE — G8420 CALC BMI NORM PARAMETERS: HCPCS | Performed by: INTERNAL MEDICINE

## 2020-02-26 PROCEDURE — 1123F ACP DISCUSS/DSCN MKR DOCD: CPT | Performed by: INTERNAL MEDICINE

## 2020-02-26 PROCEDURE — 36415 COLL VENOUS BLD VENIPUNCTURE: CPT | Performed by: INTERNAL MEDICINE

## 2020-02-26 PROCEDURE — 4040F PNEUMOC VAC/ADMIN/RCVD: CPT | Performed by: INTERNAL MEDICINE

## 2020-02-26 PROCEDURE — 1036F TOBACCO NON-USER: CPT | Performed by: INTERNAL MEDICINE

## 2020-02-26 PROCEDURE — 3017F COLORECTAL CA SCREEN DOC REV: CPT | Performed by: INTERNAL MEDICINE

## 2020-02-26 PROCEDURE — G8427 DOCREV CUR MEDS BY ELIG CLIN: HCPCS | Performed by: INTERNAL MEDICINE

## 2020-02-26 PROCEDURE — 99213 OFFICE O/P EST LOW 20 MIN: CPT | Performed by: INTERNAL MEDICINE

## 2020-02-26 PROCEDURE — 1090F PRES/ABSN URINE INCON ASSESS: CPT | Performed by: INTERNAL MEDICINE

## 2020-02-26 PROCEDURE — G8399 PT W/DXA RESULTS DOCUMENT: HCPCS | Performed by: INTERNAL MEDICINE

## 2020-02-26 PROCEDURE — G8484 FLU IMMUNIZE NO ADMIN: HCPCS | Performed by: INTERNAL MEDICINE

## 2020-02-26 RX ORDER — FEXOFENADINE HCL 180 MG/1
180 TABLET ORAL DAILY
COMMUNITY

## 2020-02-26 ASSESSMENT — ENCOUNTER SYMPTOMS
SHORTNESS OF BREATH: 0
COUGH: 0

## 2020-02-26 NOTE — PROGRESS NOTES
Subjective:    cc:  palpitations  Patient ID: Gregorio Huff is a 76 y.o. female. HPI  C/o palpitations x 1 wk. Intermittently but occurs daily and throughout the day. Not related to meals,  Exertion,   No cp or sob,  + lightheadedness. No syncope. Started after starting prednisone but has never happened in the past with pred. Today is last day. On lower dose now but no change in palpitations. Occurring now. Review of Systems   Respiratory: Negative for cough and shortness of breath. Cardiovascular: Negative for chest pain, palpitations and leg swelling. Neurological: Positive for dizziness and light-headedness. Negative for syncope. Prior to Visit Medications    Medication Sig Taking? Authorizing Provider   predniSONE (DELTASONE) 20 MG tablet 1 po bid x 5 d 1 po qd x 5 d with food Yes Lauren Sacks, MD   hydrOXYzine (ATARAX) 10 MG tablet Take 1 tablet by mouth 3 times daily as needed for Itching Yes Lauren Sacks, MD   traMADol (ULTRAM) 50 MG tablet Take 1 tablet by mouth 2 times daily for 30 days. Yes Lauren Sacks, MD   levocetirizine (XYZAL) 5 MG tablet Take 5 mg by mouth nightly Yes Historical Provider, MD   ibuprofen (ADVIL;MOTRIN) 600 MG tablet TAKE ONE TABLET BY MOUTH EVERY 6 HOURS AS NEEDED FOR PAIN Yes Lauren Sacks, MD   lansoprazole (PREVACID) 30 MG delayed release capsule TAKE 1 CAPSULE EVERY DAY Yes Lauren Sacks, MD   amLODIPine (NORVASC) 2.5 MG tablet Take 1 tablet by mouth daily Yes Lauren Sacks, MD   simvastatin (ZOCOR) 20 MG tablet TAKE 1 TABLET EVERY NIGHT Yes Lauren Sacks, MD   Oxymetazoline HCl (QC NASAL RELIEF SINUS NA) by Nasal route Yes Historical Provider, MD   ketoconazole (NIZORAL) 2 % cream Apply topically daily. Yes John Knowles MD   desoximetasone (TOPICORT) 0.25 % cream Apply topically 2 times daily. Yes Lauren Sacks, MD mupirocin [facility]) 2 % ointment Apply topically 3 times daily.  Yes Lauren Sacks, MD Tiotropium Bromide Monohydrate (SPIRIVA RESPIMAT IN) Inhale into the lungs Yes Historical Provider, MD   cromolyn (INTAL) 20 MG/2ML nebulizer solution  Yes Historical Provider, MD   ALVESCO 160 MCG/ACT AERS  Yes Historical Provider, MD   montelukast (SINGULAIR) 10 MG tablet Take 1 tablet by mouth nightly. Yes Lyric Doherty MD   azelastine (ASTELIN) 137 MCG/SPRAY nasal spray 2 sprays by Nasal route daily. Use in each nostril as directed Yes Lyric Doherty MD   albuterol (PROVENTIL HFA;VENTOLIN HFA) 108 (90 BASE) MCG/ACT inhaler Inhale 2 puffs into the lungs. AS NEEDED. Yes Historical Provider, MD   Multiple Vitamins-Minerals (CENTRUM SILVER) TABS Take  by mouth. Yes Historical Provider, MD   Misc Natural Products (OSTEO BI-FLEX ADV TRIPLE ST) TABS Take 2 tablets by mouth daily. Yes Historical Provider, MD   Calcium Carbonate-Vit D-Min (CALTRATE 600+D PLUS) 600-400 MG-UNIT TABS Take 2 tablets by mouth daily. Yes Historical Provider, MD     BP (!) 144/70 (Site: Right Upper Arm, Position: Sitting, Cuff Size: Medium Adult)   Pulse 84   Resp 16   Ht 5' 2\" (1.575 m)   Wt 125 lb (56.7 kg)   LMP  (Approximate)   SpO2 99%   BMI 22.86 kg/m²     Objective:   Physical Exam  Constitutional:       General: She is not in acute distress. Appearance: She is well-developed. Neck:      Vascular: No JVD. Comments: No bruits  Cardiovascular:      Rate and Rhythm: Normal rate and regular rhythm. Heart sounds: Normal heart sounds. No murmur. No friction rub. No gallop. Comments: C/o palpitations currently despite reg rate and rhythm on exam  Pulmonary:      Effort: Pulmonary effort is normal. No respiratory distress. Breath sounds: Normal breath sounds. No wheezing, rhonchi or rales. Assessment:      1. Heart palpitations    2. Mixed hyperlipidemia            Plan:      Yvonne was seen today for irregular heart beat.     Diagnoses and all orders for this visit:    Heart

## 2020-02-28 RX ORDER — SIMVASTATIN 40 MG
40 TABLET ORAL EVERY EVENING
Qty: 30 TABLET | Refills: 5 | Status: SHIPPED | OUTPATIENT
Start: 2020-02-28 | End: 2020-10-21

## 2020-04-15 RX ORDER — TRAMADOL HYDROCHLORIDE 50 MG/1
TABLET ORAL
Qty: 60 TABLET | Refills: 0 | Status: SHIPPED | OUTPATIENT
Start: 2020-04-15 | End: 2020-05-18

## 2020-04-15 NOTE — TELEPHONE ENCOUNTER
Last ov 2/26/2020  Future Appointments   Date Time Provider Lex Mcdonnell   5/11/2020  8:15 AM Corey Murcia MD [facility]     Last refill 2/12/2020

## 2020-04-20 RX ORDER — BACLOFEN 5 MG/1
TABLET ORAL
Qty: 30 TABLET | Refills: 5 | Status: SHIPPED | OUTPATIENT
Start: 2020-04-20 | End: 2021-05-24

## 2020-04-20 RX ORDER — HYDROXYZINE HYDROCHLORIDE 10 MG/1
TABLET, FILM COATED ORAL
Qty: 30 TABLET | Refills: 5 | Status: SHIPPED | OUTPATIENT
Start: 2020-04-20 | End: 2021-05-24

## 2020-04-20 RX ORDER — AMLODIPINE BESYLATE 2.5 MG/1
2.5 TABLET ORAL DAILY
Qty: 90 TABLET | Refills: 3 | Status: SHIPPED | OUTPATIENT
Start: 2020-04-20 | End: 2020-12-09

## 2020-05-11 ENCOUNTER — VIRTUAL VISIT (OUTPATIENT)
Dept: INTERNAL MEDICINE CLINIC | Age: 75
End: 2020-05-11
Payer: MEDICARE

## 2020-05-11 PROCEDURE — 99441 PR PHYS/QHP TELEPHONE EVALUATION 5-10 MIN: CPT | Performed by: INTERNAL MEDICINE

## 2020-05-11 ASSESSMENT — ENCOUNTER SYMPTOMS
SORE THROAT: 0
ORTHOPNEA: 0
HEARTBURN: 0
ABDOMINAL PAIN: 0
HEMOPTYSIS: 0
CHOKING: 0
WHEEZING: 0
COUGH: 0
GLOBUS SENSATION: 0
BELCHING: 0
SHORTNESS OF BREATH: 0
BLURRED VISION: 0
SPUTUM PRODUCTION: 0
HOARSE VOICE: 0
FREQUENT THROAT CLEARING: 0
DIFFICULTY BREATHING: 0
NAUSEA: 0
CHEST TIGHTNESS: 0

## 2020-05-11 NOTE — PROGRESS NOTES
Past procedures include an EGD. Asthma   There is no chest tightness, cough, difficulty breathing, frequent throat clearing, hemoptysis, hoarse voice, shortness of breath, sputum production or wheezing. This is a chronic problem. The current episode started more than 1 year ago. The problem occurs rarely. The problem has been unchanged (doing well on fasenra). Pertinent negatives include no chest pain, dyspnea on exertion, fever, heartburn, malaise/fatigue, myalgias, orthopnea, PND, sore throat, sweats or weight loss. Her symptoms are aggravated by pollen. Her symptoms are alleviated by beta-agonist, oral steroids, steroid inhaler and ipratropium (fasenra). She reports complete improvement on treatment. Her past medical history is significant for asthma and pneumonia. Using tramadol for rls. Completing adls. Good relief. Using 1.5-2 tab daily. Sleeping well. Never using more than 2 daily. Recent echo with good ef.  70%  Mod mr and mild tr. Allergist thought murmur significantly louder recently. Last echo 1/18    Carotid bruits:  Recent doppler normal.      Has been off chronic prednisone for over about 1 yr. Recent dexa only showed osteopenia. Now off fosamax. Continues on ca and vit d. Getting regular exercise. Review of Systems   Constitutional: Negative for fatigue, fever, malaise/fatigue and weight loss. HENT: Negative for hoarse voice and sore throat. Eyes: Negative for blurred vision. Respiratory: Negative for cough, hemoptysis, sputum production, choking, shortness of breath and wheezing. Cardiovascular: Negative for chest pain, dyspnea on exertion, palpitations, orthopnea, leg swelling and PND. Gastrointestinal: Negative for abdominal pain, dysphagia, heartburn, melena and nausea. Musculoskeletal: Negative for arthralgias, joint swelling, myalgias and muscle weakness. Neurological: Negative for focal weakness, syncope and light-headedness.        Prior to Visit puffs into the lungs. AS NEEDED. Historical Provider, MD   Multiple Vitamins-Minerals (CENTRUM SILVER) TABS Take  by mouth. Historical Provider, MD   Misc Natural Products (OSTEO BI-FLEX ADV TRIPLE ST) TABS Take 2 tablets by mouth daily. Historical Provider, MD   Calcium Carbonate-Vit D-Min (CALTRATE 600+D PLUS) 600-400 MG-UNIT TABS Take 2 tablets by mouth daily. Historical Provider, MD           Objective:   Physical Exam   Constitutional: She is oriented to person, place, and time. She appears well-developed and well-nourished. No distress. Pulmonary/Chest: No respiratory distress. Neurological: She is alert and oriented to person, place, and time. Psychiatric: She has a normal mood and affect. Her behavior is normal. Judgment and thought content normal.       Assessment:      1. Essential hypertension    2. Mixed hyperlipidemia    3. Gastroesophageal reflux disease, esophagitis presence not specified    4. Mild persistent asthma without complication    5. Moderate mitral regurgitation    6. Mild tricuspid regurgitation    7. RLS (restless legs syndrome)    8. Osteopenia of multiple sites    9. Heart palpitations              Plan:     Diagnoses and all orders for this visit:    Essential hypertension    Mixed hyperlipidemia    Gastroesophageal reflux disease, esophagitis presence not specified    Mild persistent asthma without complication    Moderate mitral regurgitation    Mild tricuspid regurgitation    RLS (restless legs syndrome)    Osteopenia of multiple sites    Heart palpitations      Yvonne Carcamo is a 76 y.o. female evaluated via telephone on 5/11/2020. Consent:  She and/or health care decision maker is aware that that she may receive a bill for this telephone service, depending on her insurance coverage, and has provided verbal consent to proceed: Yes      Documentation:  I communicated with the patient and/or health care decision maker about see above.    Details of this discussion including any medical advice provided: see above      I affirm this is a Patient Initiated Episode with a Patient who has not had a related appointment within my department in the past 7 days or scheduled within the next 24 hours. Patient identification was verified at the start of the visit: Yes    Total Time: minutes: 5-10 minutes    Note: not billable if this call serves to triage the patient into an appointment for the relevant concern      [address]. [address]       Controlled Substance Monitoring:    Acute and Chronic Pain Monitoring:   RX Monitoring 5/11/2020   Attestation -   Periodic Controlled Substance Monitoring No signs of potential drug abuse or diversion identified. Chronic Pain > 50 MEDD Obtained or confirmed \"Consent for Opioid Use\" on file.    Chronic Pain > 80 MEDD -

## 2020-05-18 RX ORDER — TRAMADOL HYDROCHLORIDE 50 MG/1
TABLET ORAL
Qty: 60 TABLET | Refills: 1 | Status: SHIPPED | OUTPATIENT
Start: 2020-05-18 | End: 2020-07-17

## 2020-07-17 RX ORDER — TRAMADOL HYDROCHLORIDE 50 MG/1
TABLET ORAL
Qty: 60 TABLET | Refills: 1 | Status: SHIPPED | OUTPATIENT
Start: 2020-07-17 | End: 2020-09-27

## 2020-07-27 ENCOUNTER — PATIENT MESSAGE (OUTPATIENT)
Dept: INTERNAL MEDICINE CLINIC | Age: 75
End: 2020-07-27

## 2020-07-27 RX ORDER — MONTELUKAST SODIUM 10 MG/1
10 TABLET ORAL NIGHTLY
Qty: 90 TABLET | Refills: 3 | Status: SHIPPED | OUTPATIENT
Start: 2020-07-27 | End: 2021-06-02

## 2020-07-27 NOTE — TELEPHONE ENCOUNTER
From: Yvonne Carcamo  To: Maday Thomas MD  Sent: 7/27/2020 9:55 AM EDT  Subject: Prescription Question    I am almost out of Montelucast and I see that, once again, it does not show up on my [place] [id number] list of prescriptions. Can you please send in a 3-month prescription?  Thank you

## 2020-09-02 ENCOUNTER — OFFICE VISIT (OUTPATIENT)
Dept: INTERNAL MEDICINE CLINIC | Age: 75
End: 2020-09-02
Payer: MEDICARE

## 2020-09-02 VITALS
HEART RATE: 82 BPM | TEMPERATURE: 97.3 F | WEIGHT: 127.2 LBS | SYSTOLIC BLOOD PRESSURE: 160 MMHG | DIASTOLIC BLOOD PRESSURE: 80 MMHG | BODY MASS INDEX: 23.27 KG/M2 | OXYGEN SATURATION: 99 %

## 2020-09-02 LAB
A/G RATIO: 2.4 (ref 1.1–2.2)
ALBUMIN SERPL-MCNC: 3.8 G/DL (ref 3.4–5)
ALP BLD-CCNC: 69 U/L (ref 40–129)
ALT SERPL-CCNC: 24 U/L (ref 10–40)
ANION GAP SERPL CALCULATED.3IONS-SCNC: 10 MMOL/L (ref 3–16)
AST SERPL-CCNC: 31 U/L (ref 15–37)
BILIRUB SERPL-MCNC: <0.2 MG/DL (ref 0–1)
BUN BLDV-MCNC: 9 MG/DL (ref 7–20)
CALCIUM SERPL-MCNC: 9.2 MG/DL (ref 8.3–10.6)
CHLORIDE BLD-SCNC: 103 MMOL/L (ref 99–110)
CHOLESTEROL, TOTAL: 170 MG/DL (ref 0–199)
CO2: 26 MMOL/L (ref 21–32)
CREAT SERPL-MCNC: 0.6 MG/DL (ref 0.6–1.2)
GFR AFRICAN AMERICAN: >60
GFR NON-AFRICAN AMERICAN: >60
GLOBULIN: 1.6 G/DL
GLUCOSE BLD-MCNC: 96 MG/DL (ref 70–99)
HDLC SERPL-MCNC: 51 MG/DL (ref 40–60)
LDL CHOLESTEROL CALCULATED: 78 MG/DL
POTASSIUM SERPL-SCNC: 5.2 MMOL/L (ref 3.5–5.1)
SODIUM BLD-SCNC: 139 MMOL/L (ref 136–145)
TOTAL PROTEIN: 5.4 G/DL (ref 6.4–8.2)
TRIGL SERPL-MCNC: 205 MG/DL (ref 0–150)
VLDLC SERPL CALC-MCNC: 41 MG/DL

## 2020-09-02 PROCEDURE — 1123F ACP DISCUSS/DSCN MKR DOCD: CPT | Performed by: INTERNAL MEDICINE

## 2020-09-02 PROCEDURE — G8427 DOCREV CUR MEDS BY ELIG CLIN: HCPCS | Performed by: INTERNAL MEDICINE

## 2020-09-02 PROCEDURE — 99214 OFFICE O/P EST MOD 30 MIN: CPT | Performed by: INTERNAL MEDICINE

## 2020-09-02 PROCEDURE — G8420 CALC BMI NORM PARAMETERS: HCPCS | Performed by: INTERNAL MEDICINE

## 2020-09-02 PROCEDURE — 1036F TOBACCO NON-USER: CPT | Performed by: INTERNAL MEDICINE

## 2020-09-02 PROCEDURE — 4040F PNEUMOC VAC/ADMIN/RCVD: CPT | Performed by: INTERNAL MEDICINE

## 2020-09-02 PROCEDURE — 1090F PRES/ABSN URINE INCON ASSESS: CPT | Performed by: INTERNAL MEDICINE

## 2020-09-02 PROCEDURE — 3017F COLORECTAL CA SCREEN DOC REV: CPT | Performed by: INTERNAL MEDICINE

## 2020-09-02 PROCEDURE — 36415 COLL VENOUS BLD VENIPUNCTURE: CPT | Performed by: INTERNAL MEDICINE

## 2020-09-02 PROCEDURE — G8399 PT W/DXA RESULTS DOCUMENT: HCPCS | Performed by: INTERNAL MEDICINE

## 2020-09-02 ASSESSMENT — ENCOUNTER SYMPTOMS
ORTHOPNEA: 0
HOARSE VOICE: 0
ABDOMINAL PAIN: 0
NAUSEA: 0
HEMOPTYSIS: 0
CHEST TIGHTNESS: 0
SORE THROAT: 0
BELCHING: 0
SPUTUM PRODUCTION: 0
WHEEZING: 0
CHOKING: 0
FREQUENT THROAT CLEARING: 0
GLOBUS SENSATION: 0
BLURRED VISION: 0
SHORTNESS OF BREATH: 0
DIFFICULTY BREATHING: 0
COUGH: 0
HEARTBURN: 0

## 2020-09-02 NOTE — PROGRESS NOTES
Subjective:    Here for recheck of htn, hld,gerd, asthma and rls. Patient ID: Remberto Cali is a 76 y.o. female. Hypertension   This is a chronic problem. The current episode started more than 1 year ago. The problem is unchanged. The problem is uncontrolled (did not take am meds. monitoring bps at home and all 756 systolic.  ). Pertinent negatives include no blurred vision, chest pain, malaise/fatigue, orthopnea, palpitations, peripheral edema, PND, shortness of breath or sweats. There are no associated agents to hypertension. Risk factors for coronary artery disease include dyslipidemia and post-menopausal state. Past treatments include ACE inhibitors and calcium channel blockers. The current treatment provides significant improvement. There are no compliance problems. Hyperlipidemia   This is a chronic problem. The current episode started more than 1 year ago. The problem is controlled. Recent lipid tests were reviewed and are normal. There are no known factors aggravating her hyperlipidemia. Pertinent negatives include no chest pain, focal sensory loss, focal weakness, leg pain, myalgias or shortness of breath. Current antihyperlipidemic treatment includes statins. The current treatment provides significant improvement of lipids. There are no compliance problems. Risk factors for coronary artery disease include dyslipidemia, hypertension and post-menopausal.   Gastroesophageal Reflux   She reports no abdominal pain, no belching, no chest pain, no choking, no coughing, no dysphagia, no early satiety, no globus sensation, no heartburn, no hoarse voice, no nausea, no sore throat or no wheezing. This is a chronic problem. The current episode started more than 1 year ago. The problem occurs rarely. The problem has been unchanged. Nothing aggravates the symptoms. Pertinent negatives include no anemia, fatigue, melena, muscle weakness, orthopnea or weight loss. There are no known risk factors.  She has tried a PPI for the symptoms. The treatment provided significant relief. Past procedures include an EGD. Asthma   There is no chest tightness, cough, difficulty breathing, frequent throat clearing, hemoptysis, hoarse voice, shortness of breath, sputum production or wheezing. This is a chronic problem. The current episode started more than 1 year ago. The problem occurs rarely. The problem has been unchanged (doing well on fasenra). Pertinent negatives include no chest pain, dyspnea on exertion, fever, heartburn, malaise/fatigue, myalgias, orthopnea, PND, sore throat, sweats or weight loss. Her symptoms are aggravated by pollen. Her symptoms are alleviated by beta-agonist, oral steroids, steroid inhaler and ipratropium (fasenra). She reports complete improvement on treatment. Her past medical history is significant for asthma and pneumonia. Using tramadol for rls. Completing adls. Good relief. Using 1.5-2 tab daily. Sleeping well. Never using more than 2 daily. Recent echo with good ef.  70%  Mod mr and mild tr. Unchanged. Carotid bruits:  Recent doppler normal.      Has been off chronic prednisone for over about 1 yr. Recent dexa only showed osteopenia. Now off fosamax. Continues on ca and vit d. Getting regular exercise. Asthma under great control with current meds. Review of Systems   Constitutional: Negative for fatigue, fever, malaise/fatigue and weight loss. HENT: Negative for hoarse voice and sore throat. Eyes: Negative for blurred vision. Respiratory: Negative for cough, hemoptysis, sputum production, choking, shortness of breath and wheezing. Cardiovascular: Negative for chest pain, dyspnea on exertion, palpitations, orthopnea, leg swelling and PND. Gastrointestinal: Negative for abdominal pain, dysphagia, heartburn, melena and nausea. Musculoskeletal: Negative for arthralgias, joint swelling, myalgias and muscle weakness.    Neurological: Negative for focal weakness, syncope and light-headedness. Prior to Visit Medications    Medication Sig Taking? Authorizing Provider   montelukast (SINGULAIR) 10 MG tablet Take 1 tablet by mouth nightly Yes Vivi Nolasco MD   hydrOXYzine (ATARAX) 10 MG tablet TAKE ONE TABLET BY MOUTH THREE TIMES A DAY AS NEEDED FOR ITCHING Yes Vivi Nolasco MD   Baclofen 5 MG TABS TAKE ONE TABLET BY MOUTH TWICE A DAY AS NEEDED FOR PAIN Yes Vivi Nolasco MD   amLODIPine (NORVASC) 2.5 MG tablet Take 1 tablet by mouth daily Yes Vivi Nolasco MD   simvastatin (ZOCOR) 40 MG tablet Take 1 tablet by mouth every evening Yes Vivi Nolasco MD   fexofenadine (ALLEGRA ALLERGY) 180 MG tablet Take 180 mg by mouth daily Yes Historical Provider, MD   ibuprofen (ADVIL;MOTRIN) 600 MG tablet TAKE ONE TABLET BY MOUTH EVERY 6 HOURS AS NEEDED FOR PAIN Yes Vivi Nolasco MD   lansoprazole (PREVACID) 30 MG delayed release capsule TAKE 1 CAPSULE EVERY DAY Yes Vivi Nolasco MD   ketoconazole (NIZORAL) 2 % cream Apply topically daily. Yes Favian Tran MD   desoximetasone (TOPICORT) 0.25 % cream Apply topically 2 times daily. Yes Vivi Nolasco MD   mupirocin [facility]) 2 % ointment Apply topically 3 times daily. Yes Vivi Nolasco MD   Tiotropium Bromide Monohydrate (SPIRIVA RESPIMAT IN) Inhale 2 puffs into the lungs daily  Yes Historical Provider, MD   cromolyn (INTAL) 20 MG/2ML nebulizer solution Take by nebulization as needed  Yes Historical Provider, MD   ALVESCO 160 MCG/ACT AERS daily  Yes Historical Provider, MD   azelastine (ASTELIN) 137 MCG/SPRAY nasal spray 2 sprays by Nasal route daily. Use in each nostril as directed Yes Vivi Nolasco MD   albuterol (PROVENTIL HFA;VENTOLIN HFA) 108 (90 BASE) MCG/ACT inhaler Inhale 2 puffs into the lungs. AS NEEDED. Yes Historical Provider, MD   Multiple Vitamins-Minerals (CENTRUM SILVER) TABS Take  by mouth.    Yes Historical Provider, MD   [facility] [phone] [id number] BI-FLEX ADV TRIPLE ST) TABS Take 2 tablets by mouth daily. Yes Historical Provider, MD   Calcium Carbonate-Vit D-Min (CALTRATE 600+D PLUS) 600-400 MG-UNIT TABS Take 2 tablets by mouth daily. Yes Historical Provider, MD   Oxymetazoline HCl (QC NASAL RELIEF SINUS NA) by Nasal route  Historical Provider, MD       BP (!) 160/80 (Site: Left Upper Arm, Position: Sitting, Cuff Size: Medium Adult)   Pulse 82   Temp 97.3 °F (36.3 °C)   Wt 127 lb 3.2 oz (57.7 kg)   SpO2 99%   Breastfeeding No   BMI 23.27 kg/m²     Objective:   Physical Exam   Constitutional: She appears well-developed and well-nourished. No distress. Neck: Normal range of motion. Neck supple. No JVD present. No thyromegaly present.   + bilat bruit   Cardiovascular: Normal rate and regular rhythm. Exam reveals no gallop and no friction rub. Murmur heard. Systolic murmur is present with a grade of 2/6. Pulmonary/Chest: Effort normal and breath sounds normal. No respiratory distress. She has no wheezes. She has no rhonchi. She has no rales. Musculoskeletal:         General: No edema. Skin: Skin is warm and dry. She is not diaphoretic. Vitals reviewed. Assessment:      1. Essential hypertension    2. Mixed hyperlipidemia    3. Gastroesophageal reflux disease, esophagitis presence not specified    4. Mild persistent asthma without complication    5. RLS (restless legs syndrome)    6. Moderate mitral regurgitation    7. Mild tricuspid regurgitation    8. Osteopenia of multiple sites              Plan:      Diagnoses and all orders for this visit:    Essential hypertension:  Monitor home bps and call if sbp>140 consistently. Cont same meds for now  -     Comprehensive Metabolic Panel; Future    Mixed hyperlipidemia:  Check labs, adjust med prn  -     Comprehensive Metabolic Panel; Future  -     Lipid Panel;  Future    Gastroesophageal reflux disease, esophagitis presence not specified:  Stable, cont same meds  -     Comprehensive

## 2020-09-07 LAB
6-ACETYLMORPHINE: NOT DETECTED
7-AMINOCLONAZEPAM: NOT DETECTED
ALPHA-OH-ALPRAZOLAM: NOT DETECTED
ALPRAZOLAM: NOT DETECTED
AMPHETAMINE: NOT DETECTED
BARBITURATES: NOT DETECTED
BENZOYLECGONINE: NOT DETECTED
BUPRENORPHINE: NOT DETECTED
CARISOPRODOL: NOT DETECTED
CLONAZEPAM: NOT DETECTED
CODEINE: NOT DETECTED
CREATININE URINE: <20 MG/DL (ref 20–400)
DIAZEPAM: NOT DETECTED
DRUGS EXPECTED: ABNORMAL
EER PAIN MGT DRUG PANEL, HIGH RES/EMIT U: ABNORMAL
ETHYL GLUCURONIDE: NOT DETECTED
FENTANYL: NOT DETECTED
HYDROCODONE: NOT DETECTED
HYDROMORPHONE: NOT DETECTED
LORAZEPAM: NOT DETECTED
MARIJUANA METABOLITE: NOT DETECTED
MDA: NOT DETECTED
MDEA: NOT DETECTED
MDMA URINE: NOT DETECTED
MEPERIDINE: NOT DETECTED
METHADONE: NOT DETECTED
METHAMPHETAMINE: NOT DETECTED
METHYLPHENIDATE: NOT DETECTED
MIDAZOLAM: NOT DETECTED
MORPHINE: NOT DETECTED
NORBUPRENORPHINE, FREE: NOT DETECTED
NORDIAZEPAM: NOT DETECTED
NORFENTANYL: NOT DETECTED
NORHYDROCODONE, URINE: NOT DETECTED
NOROXYCODONE: NOT DETECTED
NOROXYMORPHONE, URINE: NOT DETECTED
OXAZEPAM: NOT DETECTED
OXYCODONE: NOT DETECTED
OXYMORPHONE: NOT DETECTED
PAIN MANAGEMENT DRUG PANEL: ABNORMAL
PAIN MANAGEMENT DRUG PANEL: ABNORMAL
PCP: NOT DETECTED
PHENTERMINE: NOT DETECTED
PROPOXYPHENE: NOT DETECTED
TAPENTADOL, URINE: NOT DETECTED
TAPENTADOL-O-SULFATE, URINE: NOT DETECTED
TEMAZEPAM: NOT DETECTED
TRAMADOL: PRESENT
ZOLPIDEM: NOT DETECTED

## 2020-09-09 RX ORDER — LANSOPRAZOLE 30 MG/1
CAPSULE, DELAYED RELEASE ORAL
Qty: 90 CAPSULE | Refills: 3 | Status: SHIPPED | OUTPATIENT
Start: 2020-09-09 | End: 2021-06-21

## 2020-09-09 NOTE — TELEPHONE ENCOUNTER
LAST OV 9/2/2020  Future Appointments   Date Time Provider Lex Mcdonnell   12/9/2020 10:30 AM M Beaufort MD [facility]

## 2020-09-25 ENCOUNTER — TELEPHONE (OUTPATIENT)
Dept: INTERNAL MEDICINE CLINIC | Age: 75
End: 2020-09-25

## 2020-09-25 NOTE — TELEPHONE ENCOUNTER
----- Message from Maribell Sarabia sent at 9/25/2020  1:08 PM EDT -----  Subject: Appointment Request    Reason for Call: Routine Pre-Op    QUESTIONS  Type of Appointment? Established Patient  Reason for appointment request? Available appointments did not meet   patient need  Additional Information for Provider? Patient needs appointment--- Oct 5-   Oct 9 for PRE OP cataract surgery   prefer mornings but anytime will work.   ---------------------------------------------------------------------------  --------------  Amisha GARCIA  What is the best way for the office to contact you? OK to leave message on   voicemail  Preferred Call Back Phone Number? [phone]  ---------------------------------------------------------------------------  --------------  SCRIPT ANSWERS  Relationship to Patient? Self  Appointment reason? Symptomatic  Select script based on patient symptoms? Adult Pre-Op  Do you have question for your provider that need to be answered prior to   scheduling your pre-op appointment? No  Have you been diagnosed with   tested for   or told that you are suspected of having COVID-19 (Coronavirus)? No  Have you had a fever or taken medication to treat a fever within the past   3 days? No  Have you had a cough   shortness of breath or flu-like symptoms within the past 3 days? No  Do you currently have flu-like symptoms including fever or chills   cough   shortness of breath   or difficulty breathing   or new loss of taste or smell? No  (Service Expert  click yes below to proceed with [employer] As Usual   Scheduling)?  Yes

## 2020-09-28 RX ORDER — TRAMADOL HYDROCHLORIDE 50 MG/1
TABLET ORAL
Qty: 60 TABLET | Refills: 0 | Status: SHIPPED | OUTPATIENT
Start: 2020-09-28 | End: 2020-10-23

## 2020-09-28 NOTE — TELEPHONE ENCOUNTER
Last visit: 09/02/2020  Future Appointments   Date Time Provider Lex Kecia   10/6/2020  9:30 AM Linda Rutherford, [address] [facility] IM [address] [place] [address]   12/9/2020 10:30 AM Jorie Cabot, MD [facility] IM [facility]

## 2020-10-06 ENCOUNTER — OFFICE VISIT (OUTPATIENT)
Dept: INTERNAL MEDICINE CLINIC | Age: 75
End: 2020-10-06
Payer: MEDICARE

## 2020-10-06 VITALS
SYSTOLIC BLOOD PRESSURE: 138 MMHG | HEART RATE: 81 BPM | RESPIRATION RATE: 18 BRPM | DIASTOLIC BLOOD PRESSURE: 76 MMHG | BODY MASS INDEX: 23.67 KG/M2 | OXYGEN SATURATION: 99 % | TEMPERATURE: 97.3 F | WEIGHT: 129.4 LBS

## 2020-10-06 PROCEDURE — 3017F COLORECTAL CA SCREEN DOC REV: CPT | Performed by: NURSE PRACTITIONER

## 2020-10-06 PROCEDURE — G8420 CALC BMI NORM PARAMETERS: HCPCS | Performed by: NURSE PRACTITIONER

## 2020-10-06 PROCEDURE — G8399 PT W/DXA RESULTS DOCUMENT: HCPCS | Performed by: NURSE PRACTITIONER

## 2020-10-06 PROCEDURE — G8427 DOCREV CUR MEDS BY ELIG CLIN: HCPCS | Performed by: NURSE PRACTITIONER

## 2020-10-06 PROCEDURE — 1090F PRES/ABSN URINE INCON ASSESS: CPT | Performed by: NURSE PRACTITIONER

## 2020-10-06 PROCEDURE — 1123F ACP DISCUSS/DSCN MKR DOCD: CPT | Performed by: NURSE PRACTITIONER

## 2020-10-06 PROCEDURE — 99213 OFFICE O/P EST LOW 20 MIN: CPT | Performed by: NURSE PRACTITIONER

## 2020-10-06 PROCEDURE — 4040F PNEUMOC VAC/ADMIN/RCVD: CPT | Performed by: NURSE PRACTITIONER

## 2020-10-06 PROCEDURE — 1036F TOBACCO NON-USER: CPT | Performed by: NURSE PRACTITIONER

## 2020-10-06 PROCEDURE — G8484 FLU IMMUNIZE NO ADMIN: HCPCS | Performed by: NURSE PRACTITIONER

## 2020-10-06 RX ORDER — PREDNISONE 1 MG/1
5 TABLET ORAL DAILY PRN
COMMUNITY
End: 2021-06-21

## 2020-10-06 RX ORDER — MULTIVITAMIN WITH IRON
1 TABLET ORAL DAILY
COMMUNITY

## 2020-10-06 RX ORDER — BENRALIZUMAB 30 MG/ML
1 INJECTION, SOLUTION SUBCUTANEOUS
COMMUNITY
End: 2021-10-29

## 2020-10-06 RX ORDER — ASCORBIC ACID 500 MG
500 TABLET ORAL DAILY
COMMUNITY

## 2020-10-06 RX ORDER — OMEGA-3S/DHA/EPA/FISH OIL 300-1000MG
1 CAPSULE ORAL DAILY
COMMUNITY

## 2020-10-06 NOTE — PROGRESS NOTES
Yvonne Carcamo  YOB: 1945    @DOS@    There were no vitals filed for this visit. Wt Readings from Last 2 Encounters:   09/02/20 127 lb 3.2 oz (57.7 kg)   02/26/20 125 lb (56.7 kg)     BP Readings from Last 3 Encounters:   09/02/20 (!) 160/80   02/26/20 (!) 144/70   02/17/20 138/80        No chief complaint on file. Allergies   Allergen Reactions    Cinnamon     Eggs [Egg White]     Mustard [Allyl Isothiocyanate]     Naprosyn [Naproxen]     Other      TREE NUTS, PEANUTS    Pcn [Penicillins]     Red Dye      rash    Soy Allergy     Tylenol [Acetaminophen]      Current Outpatient Medications   Medication Sig Dispense Refill    traMADol (ULTRAM) 50 MG tablet TAKE ONE TABLET BY MOUTH TWICE A DAY REDUCE DOSES TAKEN AS PAIN BECOMES MANAGEABLE 60 tablet 0    lansoprazole (PREVACID) 30 MG delayed release capsule TAKE 1 CAPSULE EVERY DAY 90 capsule 3    montelukast (SINGULAIR) 10 MG tablet Take 1 tablet by mouth nightly 90 tablet 3    hydrOXYzine (ATARAX) 10 MG tablet TAKE ONE TABLET BY MOUTH THREE TIMES A DAY AS NEEDED FOR ITCHING 30 tablet 5    Baclofen 5 MG TABS TAKE ONE TABLET BY MOUTH TWICE A DAY AS NEEDED FOR PAIN 30 tablet 5    amLODIPine (NORVASC) 2.5 MG tablet Take 1 tablet by mouth daily 90 tablet 3    simvastatin (ZOCOR) 40 MG tablet Take 1 tablet by mouth every evening 30 tablet 5    fexofenadine (ALLEGRA ALLERGY) 180 MG tablet Take 180 mg by mouth daily      ibuprofen (ADVIL;MOTRIN) 600 MG tablet TAKE ONE TABLET BY MOUTH EVERY 6 HOURS AS NEEDED FOR PAIN 120 tablet 3    Oxymetazoline HCl (QC NASAL RELIEF SINUS NA) by Nasal route      ketoconazole (NIZORAL) 2 % cream Apply topically daily. 45 g 1    desoximetasone (TOPICORT) 0.25 % cream Apply topically 2 times daily. 30 g 0    mupirocin (BACTROBAN) 2 % ointment Apply topically 3 times daily.       Tiotropium Bromide Monohydrate (SPIRIVA RESPIMAT IN) Inhale 2 puffs into the lungs daily       cromolyn (INTAL) 20 MG/2ML nebulizer solution Take by nebulization as needed       ALVESCO 160 MCG/ACT AERS daily       azelastine (ASTELIN) 137 MCG/SPRAY nasal spray 2 sprays by Nasal route daily. Use in each nostril as directed 3 Bottle 3    albuterol (PROVENTIL HFA;VENTOLIN HFA) 108 (90 BASE) MCG/ACT inhaler Inhale 2 puffs into the lungs. AS NEEDED.  Multiple Vitamins-Minerals (CENTRUM SILVER) TABS Take  by mouth.  Misc Natural Products (OSTEO BI-FLEX ADV TRIPLE ST) TABS Take 2 tablets by mouth daily.  Calcium Carbonate-Vit D-Min (CALTRATE 600+D PLUS) 600-400 MG-UNIT TABS Take 2 tablets by mouth daily. No current facility-administered medications for this visit. This patient presents to the office today for a preoperative consultation at the request of surgeon, dr Tommie De La Paz who plans on performing  Left cataract surgery  on October 13 for cataracts . The current problem began 2 years ago and has worsened. Conservative therapy, including none, has failed. Planned anesthesia is Topical anesthesia. The patient has the following known anesthesia issues: none. Patient has a bleeding risk of : no recent abnormal bleeding. Has noted that the bright light are hurting eyes in year. She states that she has not scheduled for the right eye until her follow up with Dr Christina Garcia.    Patient Active Problem List   Diagnosis    Allergic rhinitis    RLS (restless legs syndrome)    Osteopenia    Seasonal allergies    Arthritis    Essential hypertension    Gastroesophageal reflux disease    Mild persistent asthma without complication    Mixed hyperlipidemia    Moderate mitral regurgitation    Mild tricuspid regurgitation    Symptomatic cholelithiasis    Osteoarthritis of finger of left hand       Past Medical History:   Diagnosis Date    Arthritis     Bronchial asthma     GERD (gastroesophageal reflux disease)     Hyperlipidemia     Hypertension     Mild tricuspid regurgitation 5/26/2016    Moderate mitral regurgitation 5/26/2016    By echo 1/2016.   Needs yrly echo    Prolonged emergence from general anesthesia     RLS (restless legs syndrome)     Seasonal allergies      Past Surgical History:   Procedure Laterality Date    CHOLECYSTECTOMY, LAPAROSCOPIC  08/30/2018    DILATION AND CURETTAGE OF UTERUS      NASAL SINUS SURGERY      x 3    TUBAL LIGATION       Family History   Problem Relation Age of Onset    Heart Disease Mother         chf    Stroke Mother     Arthritis Mother     Dementia Father     Cancer Father         renal cell ca    Arthritis Father      Social History     Socioeconomic History    Marital status:      Spouse name: Not on file    Number of children: Not on file    Years of education: Not on file    Highest education level: Not on file   Occupational History    Not on file   Social Needs    Financial resource strain: Not hard at all   [employer] insecurity     Worry: Never true     Inability: Never true   [employer] needs     Medical: No     Non-medical: No   Tobacco Use    Smoking status: Never Smoker    Smokeless tobacco: Never Used   Substance and Sexual Activity    Alcohol use: No    Drug use: No    Sexual activity: Yes     Partners: Male   Lifestyle    Physical activity     Days per week: Not on file     Minutes per session: Not on file    Stress: Not on file   Relationships    Social connections     Talks on phone: Not on file     Gets together: Not on file     Attends Faith service: Not on file     Active member of club or organization: Not on file     Attends meetings of clubs or organizations: Not on file     Relationship status: Not on file    Intimate partner violence     Fear of current or ex partner: Not on file     Emotionally abused: Not on file     Physically abused: Not on file     Forced sexual activity: Not on file   Other Topics Concern    Not on file   Social History Narrative    Not on file       Review of

## 2020-10-08 ENCOUNTER — TELEPHONE (OUTPATIENT)
Dept: INTERNAL MEDICINE CLINIC | Age: 75
End: 2020-10-08

## 2020-10-08 NOTE — TELEPHONE ENCOUNTER
[facility] called wanting a history and Physical sent to them  Pt has surgery on the 13th on left eye  Fax [phone]    Please advise

## 2020-10-21 RX ORDER — SIMVASTATIN 40 MG
TABLET ORAL
Qty: 90 TABLET | Refills: 3 | Status: SHIPPED | OUTPATIENT
Start: 2020-10-21 | End: 2021-10-22

## 2020-10-21 NOTE — TELEPHONE ENCOUNTER
Last ov 10. 6.20  Future Appointments   Date Time Provider Lex Mcdonnell   12/9/2020 10:30 AM Nisreen Burrell MD [facility]     rx pended

## 2020-11-23 NOTE — TELEPHONE ENCOUNTER
Last office visit :09/02/2020 with alma                             10/06/2020 with sigifredo     Future Appointments   Date Time Provider Lex Mcdonnell   12/9/2020 10:30 AM M Lance Dowell MD [facility]

## 2020-11-24 RX ORDER — TRAMADOL HYDROCHLORIDE 50 MG/1
TABLET ORAL
Qty: 60 TABLET | Refills: 0 | Status: SHIPPED | OUTPATIENT
Start: 2020-11-24 | End: 2020-12-22

## 2020-12-09 ENCOUNTER — OFFICE VISIT (OUTPATIENT)
Dept: INTERNAL MEDICINE CLINIC | Age: 75
End: 2020-12-09
Payer: MEDICARE

## 2020-12-09 VITALS
BODY MASS INDEX: 24.44 KG/M2 | WEIGHT: 133.6 LBS | DIASTOLIC BLOOD PRESSURE: 72 MMHG | RESPIRATION RATE: 12 BRPM | TEMPERATURE: 97.7 F | HEART RATE: 92 BPM | OXYGEN SATURATION: 98 % | SYSTOLIC BLOOD PRESSURE: 166 MMHG

## 2020-12-09 PROCEDURE — G8420 CALC BMI NORM PARAMETERS: HCPCS | Performed by: INTERNAL MEDICINE

## 2020-12-09 PROCEDURE — 4040F PNEUMOC VAC/ADMIN/RCVD: CPT | Performed by: INTERNAL MEDICINE

## 2020-12-09 PROCEDURE — G8427 DOCREV CUR MEDS BY ELIG CLIN: HCPCS | Performed by: INTERNAL MEDICINE

## 2020-12-09 PROCEDURE — 1090F PRES/ABSN URINE INCON ASSESS: CPT | Performed by: INTERNAL MEDICINE

## 2020-12-09 PROCEDURE — 1123F ACP DISCUSS/DSCN MKR DOCD: CPT | Performed by: INTERNAL MEDICINE

## 2020-12-09 PROCEDURE — G8399 PT W/DXA RESULTS DOCUMENT: HCPCS | Performed by: INTERNAL MEDICINE

## 2020-12-09 PROCEDURE — 99214 OFFICE O/P EST MOD 30 MIN: CPT | Performed by: INTERNAL MEDICINE

## 2020-12-09 PROCEDURE — 1036F TOBACCO NON-USER: CPT | Performed by: INTERNAL MEDICINE

## 2020-12-09 PROCEDURE — 3017F COLORECTAL CA SCREEN DOC REV: CPT | Performed by: INTERNAL MEDICINE

## 2020-12-09 PROCEDURE — G8484 FLU IMMUNIZE NO ADMIN: HCPCS | Performed by: INTERNAL MEDICINE

## 2020-12-09 RX ORDER — AMLODIPINE BESYLATE 5 MG/1
5 TABLET ORAL DAILY
Qty: 90 TABLET | Refills: 3 | Status: SHIPPED | OUTPATIENT
Start: 2020-12-09 | End: 2021-11-08 | Stop reason: SDUPTHER

## 2020-12-09 ASSESSMENT — ENCOUNTER SYMPTOMS
GLOBUS SENSATION: 0
DIFFICULTY BREATHING: 0
WHEEZING: 0
HOARSE VOICE: 0
BELCHING: 0
HEARTBURN: 0
ABDOMINAL PAIN: 0
SPUTUM PRODUCTION: 0
SORE THROAT: 0
ORTHOPNEA: 0
FREQUENT THROAT CLEARING: 0
NAUSEA: 0
HEMOPTYSIS: 0
CHOKING: 0
BLURRED VISION: 0
SHORTNESS OF BREATH: 0
CHEST TIGHTNESS: 0
COUGH: 0

## 2020-12-09 NOTE — PROGRESS NOTES
light-headedness. Prior to Visit Medications    Medication Sig Taking? Authorizing Provider   traMADol (ULTRAM) 50 MG tablet TAKE ONE TABLET BY MOUTH TWICE A DAY. Zoila Alejandra MANAGEABLE  M Marla [place] MD   simvastatin (ZOCOR) 40 MG tablet TAKE ONE TABLET BY MOUTH EVERY EVENING  Marcie Bell, APRN - CNP   Benralizumab (FASENRA PEN) 30 MG/ML SOAJ Inject 1 each into the skin Every 2 months  Historical Provider, MD   predniSONE (DELTASONE) 5 MG tablet Take 5 mg by mouth daily as needed  Historical Provider, MD   vitamin C (ASCORBIC ACID) 500 MG tablet Take 500 mg by mouth daily  Historical Provider, MD   Omega-3 Fatty Acids (OMEGA-3 FISH OIL) 300 MG CAPS Take 1 capsule by mouth daily  Historical Provider, MD   Vitamin A 2400 MCG (8000 UT) CAPS Take 1 capsule by mouth daily  Historical Provider, MD   lansoprazole (PREVACID) 30 MG delayed release capsule TAKE 1 CAPSULE EVERY DAY  MAK [place] MD   montelukast (SINGULAIR) 10 MG tablet Take 1 tablet by mouth nightly  Omari Adams MD   hydrOXYzine (ATARAX) 10 MG tablet TAKE ONE TABLET BY MOUTH THREE TIMES A DAY AS NEEDED FOR ITCHING  Omari Adams MD   Baclofen 5 MG TABS TAKE ONE TABLET BY MOUTH TWICE A DAY AS NEEDED FOR PAIN  Omari Adams MD   amLODIPine (NORVASC) 2.5 MG tablet Take 1 tablet by mouth daily  Oamri Adams MD   fexofenadine (ALLEGRA ALLERGY) 180 MG tablet Take 180 mg by mouth daily  Historical Provider, MD   ibuprofen (ADVIL;MOTRIN) 600 MG tablet TAKE ONE TABLET BY MOUTH EVERY 6 HOURS AS NEEDED FOR PAIN  MAK [place] MD   Oxymetazoline HCl (QC NASAL RELIEF SINUS NA) by Nasal route  Historical Provider, MD   ketoconazole (NIZORAL) 2 % cream Apply topically daily. Patient not taking: Reported on 10/6/2020  Conception MD Ward   desoximetasone (TOPICORT) 0.25 % cream Apply topically 2 times daily. Omari Adams MD   mupirocin [facility]) 2 % ointment Apply topically 3 times daily.   Jasbir Figueroa Bharat Bhakta MD   Tiotropium Bromide Monohydrate (SPIRIVA RESPIMAT IN) Inhale 2 puffs into the lungs daily   Historical Provider, MD   cromolyn (INTAL) 20 MG/2ML nebulizer solution Take by nebulization as needed   Historical Provider, MD   ALVESCO 160 MCG/ACT AERS daily   Historical Provider, MD   azelastine (ASTELIN) 137 MCG/SPRAY nasal spray 2 sprays by Nasal route daily. Use in each nostril as directed  Douglas Ortiz MD   albuterol (PROVENTIL HFA;VENTOLIN HFA) 108 (90 BASE) MCG/ACT inhaler Inhale 2 puffs into the lungs. AS NEEDED. Historical Provider, MD   Multiple Vitamins-Minerals (CENTRUM SILVER) TABS Take  by mouth. Historical Provider, MD   Misc Natural Products (OSTEO BI-FLEX ADV TRIPLE ST) TABS Take 2 tablets by mouth daily. Historical Provider, MD   Calcium Carbonate-Vit D-Min (CALTRATE 600+D PLUS) 600-400 MG-UNIT TABS Take 2 tablets by mouth daily. Historical Provider, MD       BP (!) 166/72 (Site: Left Upper Arm, Position: Sitting, Cuff Size: Medium Adult)   Pulse 92   Temp 97.7 °F (36.5 °C) (Infrared)   Resp 12   Wt 133 lb 9.6 oz (60.6 kg)   SpO2 98%   BMI 24.44 kg/m²     Objective:   Physical Exam   Constitutional: She appears well-developed and well-nourished. No distress. Neck: Normal range of motion. Neck supple. No JVD present. No thyromegaly present.   + bilat bruit   Cardiovascular: Normal rate and regular rhythm. Exam reveals no gallop and no friction rub. Murmur heard. Systolic murmur is present with a grade of 2/6. Pulmonary/Chest: Effort normal and breath sounds normal. No respiratory distress. She has no wheezes. She has no rhonchi. She has no rales. Musculoskeletal:         General: No edema. Skin: Skin is warm and dry. She is not diaphoretic. Vitals reviewed. Assessment:      1. Essential hypertension    2. Mixed hyperlipidemia    3. Gastroesophageal reflux disease without esophagitis    4. Mild persistent asthma without complication    5. Moderate mitral regurgitation    6. RLS (restless legs syndrome)    7. Mild tricuspid regurgitation    8. Osteopenia of multiple sites              Plan:      Zeina Wolf was seen today for hypertension. Diagnoses and all orders for this visit:    Essential hypertension:  Increase norvasc to 5 qd  -     amLODIPine (NORVASC) 5 MG tablet; Take 1 tablet by mouth daily    Mixed hyperlipidemia:  Stable, cont same meds    Gastroesophageal reflux disease without esophagitis:  Stable, cont same meds    Mild persistent asthma without complication:  Stable, cont same meds    Moderate mitral regurgitation:  monitor    RLS (restless legs syndrome): Stable, cont same meds    Mild tricuspid regurgitation:  monitor    Osteopenia of multiple sites:  Stable, cont same meds          Controlled Substance Monitoring:    Acute and Chronic Pain Monitoring:   RX Monitoring 12/9/2020   Attestation -   Periodic Controlled Substance Monitoring No signs of potential drug abuse or diversion identified. Chronic Pain > 50 MEDD Obtained or confirmed \"Consent for Opioid Use\" on file.    Chronic Pain > 80 MEDD -     3 months

## 2020-12-22 NOTE — TELEPHONE ENCOUNTER
Last office visit :12/09/2020    Future Appointments   Date Time Provider Lex Mcdonnell   3/15/2021  9:30 AM Tashi Parekh MD [facility]

## 2020-12-23 RX ORDER — TRAMADOL HYDROCHLORIDE 50 MG/1
TABLET ORAL
Qty: 60 TABLET | Refills: 1 | Status: SHIPPED | OUTPATIENT
Start: 2020-12-23 | End: 2021-01-22

## 2021-02-11 DIAGNOSIS — G25.81 RLS (RESTLESS LEGS SYNDROME): Primary | ICD-10-CM

## 2021-02-11 RX ORDER — TRAMADOL HYDROCHLORIDE 50 MG/1
TABLET ORAL
COMMUNITY
Start: 2021-01-27 | End: 2021-02-11 | Stop reason: SDUPTHER

## 2021-02-11 RX ORDER — TRAMADOL HYDROCHLORIDE 50 MG/1
50 TABLET ORAL DAILY PRN
Qty: 30 TABLET | Refills: 1 | Status: SHIPPED | OUTPATIENT
Start: 2021-02-11 | End: 2021-02-25

## 2021-02-16 DIAGNOSIS — M25.552 LEFT HIP PAIN: ICD-10-CM

## 2021-02-17 RX ORDER — IBUPROFEN 600 MG/1
TABLET ORAL
Qty: 120 TABLET | Refills: 2 | Status: SHIPPED | OUTPATIENT
Start: 2021-02-17

## 2021-02-18 ENCOUNTER — OFFICE VISIT (OUTPATIENT)
Dept: INTERNAL MEDICINE CLINIC | Age: 76
End: 2021-02-18
Payer: MEDICARE

## 2021-02-18 VITALS
SYSTOLIC BLOOD PRESSURE: 160 MMHG | RESPIRATION RATE: 18 BRPM | OXYGEN SATURATION: 98 % | HEART RATE: 84 BPM | TEMPERATURE: 97 F | DIASTOLIC BLOOD PRESSURE: 78 MMHG

## 2021-02-18 DIAGNOSIS — M54.50 ACUTE MIDLINE LOW BACK PAIN WITHOUT SCIATICA: Primary | ICD-10-CM

## 2021-02-18 PROCEDURE — G8484 FLU IMMUNIZE NO ADMIN: HCPCS | Performed by: NURSE PRACTITIONER

## 2021-02-18 PROCEDURE — G8427 DOCREV CUR MEDS BY ELIG CLIN: HCPCS | Performed by: NURSE PRACTITIONER

## 2021-02-18 PROCEDURE — 3017F COLORECTAL CA SCREEN DOC REV: CPT | Performed by: NURSE PRACTITIONER

## 2021-02-18 PROCEDURE — 1090F PRES/ABSN URINE INCON ASSESS: CPT | Performed by: NURSE PRACTITIONER

## 2021-02-18 PROCEDURE — 1123F ACP DISCUSS/DSCN MKR DOCD: CPT | Performed by: NURSE PRACTITIONER

## 2021-02-18 PROCEDURE — G8420 CALC BMI NORM PARAMETERS: HCPCS | Performed by: NURSE PRACTITIONER

## 2021-02-18 PROCEDURE — G8399 PT W/DXA RESULTS DOCUMENT: HCPCS | Performed by: NURSE PRACTITIONER

## 2021-02-18 PROCEDURE — 4040F PNEUMOC VAC/ADMIN/RCVD: CPT | Performed by: NURSE PRACTITIONER

## 2021-02-18 PROCEDURE — 1036F TOBACCO NON-USER: CPT | Performed by: NURSE PRACTITIONER

## 2021-02-18 PROCEDURE — 99213 OFFICE O/P EST LOW 20 MIN: CPT | Performed by: NURSE PRACTITIONER

## 2021-02-18 RX ORDER — METHYLPREDNISOLONE 4 MG/1
TABLET ORAL
Qty: 1 KIT | Refills: 0 | Status: SHIPPED | OUTPATIENT
Start: 2021-02-18 | End: 2021-02-24

## 2021-02-18 ASSESSMENT — ENCOUNTER SYMPTOMS
COLOR CHANGE: 0
BACK PAIN: 1
ALLERGIC/IMMUNOLOGIC NEGATIVE: 1
EYES NEGATIVE: 1
CONSTIPATION: 0
RESPIRATORY NEGATIVE: 1

## 2021-02-18 NOTE — PATIENT INSTRUCTIONS
Patient Education        Low Back Pain: Exercises  Introduction  Here are some examples of exercises for you to try. The exercises may be suggested for a condition or for rehabilitation. Start each exercise slowly. Ease off the exercises if you start to have pain. You will be told when to start these exercises and which ones will work best for you. How to do the exercises  Press-up   1. Lie on your stomach, supporting your body with your forearms. 2. Press your elbows down into the floor to raise your upper back. As you do this, relax your stomach muscles and allow your back to arch without using your back muscles. As your press up, do not let your hips or pelvis come off the floor. 3. Hold for 15 to 30 seconds, then relax. 4. Repeat 2 to 4 times. Alternate arm and leg (bird dog) exercise   Do this exercise slowly. Try to keep your body straight at all times, and do not let one hip drop lower than the other. 1. Start on the floor, on your hands and knees. 2. Tighten your belly muscles. 3. Raise one leg off the floor, and hold it straight out behind you. Be careful not to let your hip drop down, because that will twist your trunk. 4. Hold for about 6 seconds, then lower your leg and switch to the other leg. 5. Repeat 8 to 12 times on each leg. 6. Over time, work up to holding for 10 to 30 seconds each time. 7. If you feel stable and secure with your leg raised, try raising the opposite arm straight out in front of you at the same time. Knee-to-chest exercise   1. Lie on your back with your knees bent and your feet flat on the floor. 2. Bring one knee to your chest, keeping the other foot flat on the floor (or keeping the other leg straight, whichever feels better on your lower back). 3. Keep your lower back pressed to the floor. Hold for at least 15 to 30 seconds. 4. Relax, and lower the knee to the starting position. 5. Repeat with the other leg. Repeat 2 to 4 times with each leg.   6. To get more stretch, put your other leg flat on the floor while pulling your knee to your chest.    Curl-ups   1. Lie on the floor on your back with your knees bent at a 90-degree angle. Your feet should be flat on the floor, about 12 inches from your buttocks. 2. Cross your arms over your chest. If this bothers your neck, try putting your hands behind your neck (not your head), with your elbows spread apart. 3. Slowly tighten your belly muscles and raise your shoulder blades off the floor. 4. Keep your head in line with your body, and do not press your chin to your chest.  5. Hold this position for 1 or 2 seconds, then slowly lower yourself back down to the floor. 6. Repeat 8 to 12 times. Pelvic tilt exercise   1. Lie on your back with your knees bent. 2. \"Brace\" your stomach. This means to tighten your muscles by pulling in and imagining your belly button moving toward your spine. You should feel like your back is pressing to the floor and your hips and pelvis are rocking back. 3. Hold for about 6 seconds while you breathe smoothly. 4. Repeat 8 to 12 times. Heel dig bridging   1. Lie on your back with both knees bent and your ankles bent so that only your heels are digging into the floor. Your knees should be bent about 90 degrees. 2. Then push your heels into the floor, squeeze your buttocks, and lift your hips off the floor until your shoulders, hips, and knees are all in a straight line. 3. Hold for about 6 seconds as you continue to breathe normally, and then slowly lower your hips back down to the floor and rest for up to 10 seconds. 4. Do 8 to 12 repetitions. Hamstring stretch in doorway   1. Lie on your back in a doorway, with one leg through the open door. 2. Slide your leg up the wall to straighten your knee. You should feel a gentle stretch down the back of your leg. 3. Hold the stretch for at least 15 to 30 seconds. Do not arch your back, point your toes, or bend either knee.  Keep one heel touching the floor and the other heel touching the wall. 4. Repeat with your other leg. 5. Do 2 to 4 times for each leg. Hip flexor stretch   1. Kneel on the floor with one knee bent and one leg behind you. Place your forward knee over your foot. Keep your other knee touching the floor. 2. Slowly push your hips forward until you feel a stretch in the upper thigh of your rear leg. 3. Hold the stretch for at least 15 to 30 seconds. Repeat with your other leg. 4. Do 2 to 4 times on each side. Wall sit   1. Stand with your back 10 to 12 inches away from a wall. 2. Lean into the wall until your back is flat against it. 3. Slowly slide down until your knees are slightly bent, pressing your lower back into the wall. 4. Hold for about 6 seconds, then slide back up the wall. 5. Repeat 8 to 12 times. Follow-up care is a key part of your treatment and safety. Be sure to make and go to all appointments, and call your doctor if you are having problems. It's also a good idea to know your test results and keep a list of the medicines you take. Where can you learn more? Go to https://[employer].NewsCred. org and sign in to your Venda account. Enter T017 in the [facility] box to learn more about \"Low Back Pain: Exercises. \"     If you do not have an account, please click on the \"Sign Up Now\" link. Current as of: March 2, 2020               Content Version: 12.6  © 2006-2020 [employer]. Care instructions adapted under license by [facility] ([facility]). If you have questions about a medical condition or this instruction, always ask your healthcare professional. [place] [address] any warranty or liability for your use of this information. Patient Education        Learning About Low Back Pain  What is low back pain? Low back pain is pain that can occur anywhere below the ribs and above the legs. It is very common.  Almost everyone has it at one time or another. Low back pain can be:  · Acute. This is new pain that can last a few days to a few weeks--at the most a few months. · Chronic. This pain can last for more than a few months. Sometimes it can last for years. What are some myths about low back pain? Here are some common myths about low back pain--and the facts:  Myth: \"I need to rest my back when I have back pain. \"   Fact: Staying active won't hurt you. It may help you get better faster. Myth: \"I need prescription pain medicine. \"   Fact: It's best to try to let time and being active heal your back. Opioid pain medicines--such as hydrocodone or oxycodone--usually don't work any better than over-the-counter medicines like ibuprofen or naproxen. And opioids can cause serious problems like opioid use disorder or overdose. Moderate to severe opioid use disorder is sometimes called addiction. Myth: \"I need a test like an X-ray or an MRI to diagnose my low back pain. \"   Fact: Getting a test right away won't help you get better faster. And it could lead you down a treatment path you may not need, since most people get better on their own. What causes low back pain? In most cases, there isn't a clear cause. This can be frustrating, because your back hurts and there's no obvious reason. Your back pain can be caused by:  Overuse or muscle strain. This can happen from playing sports, lifting heavy things, or not being physically fit. A herniated disc. This is a problem with the cushion between the bones in your back. Arthritis. With age, you may have changes in your bones that can narrow the space around your nerves. Other causes. In rare cases, the cause is a serious illness like an infection or cancer. But there are usually other symptoms too. What are the symptoms? Back pain can come on quickly or over time. You may feel:  · Pain in your hips or buttock. · Leg pain, numbness, tingling, or weakness.  When a nerve gets squeezed--such as from a disc problem or arthritis--you may have symptoms in your leg or foot. You can even have leg symptoms from a back problem without having any pain in your back. · Pain that's sharp or dull, sometimes with stiffness or muscle spasms. It may be in one small area or over a broad area. But even bad pain doesn't mean that it's caused by something serious. How is low back pain diagnosed? A physical exam is the main way to diagnose low back pain. Your doctor may examine your back, check your nerves by testing your reflexes, and make sure that your muscles are strong. Your doctor also will ask questions about your back and overall health. Most people don't need any tests right away. Tests often don't show the reason for your pain. If your pain lasts more than 6 weeks or you have symptoms that your doctor is more concerned about, then your doctor may order tests. These may include an X-ray, a CT scan, or an MRI. Sometimes other tests such as a bone scan or nerve conduction test may be done. How is low back pain treated? Most acute low back pain gets better on its own within a few weeks, no matter what the cause. Time and doing usual activities are all that most people need to feel better. Using heat or ice and taking over-the-counter pain medicine also can help while your body heals. If you aren't getting better on your own or your pain is very bad, your doctor may recommend:  · Physical therapy. · Spinal manipulation, such as by a chiropractor. · Acupuncture. · Massage. · Injections of steroid medicine in your back (especially for pain that involves your legs). If you have chronic low back pain, treatment will help you understand and manage your pain. Treatment may include:  · Staying active. This may include walking or doing back exercises. · Physical therapy. · Medicines. Some of these medicines are also used for other problems, like depression. · Pain management.  Your doctor may have you see a pain specialist.  · Counseling. Having chronic pain can be hard. It may help to talk to someone who can help you cope with your pain. Surgery isn't needed for most people. But it may help some types of low back pain. Follow-up care is a key part of your treatment and safety. Be sure to make and go to all appointments, and call your doctor if you are having problems. It's also a good idea to know your test results and keep a list of the medicines you take. When should you call for help? Call 911 anytime you think you may need emergency care. For example, call if:  · You can't move a leg at all. Call your doctor now or seek immediate medical care if:  · You have new or worse symptoms in your legs, belly, or buttocks. Symptoms may include:  ? Numbness or tingling. ? Weakness. ? Pain. · You lose bladder or bowel control. Watch closely for changes in your health, and be sure to contact your doctor if:  · Along with the back pain, you have a fever, lose weight, or don't feel well. · You do not get better as expected. Where can you learn more? Go to https://[employer].Mobile-XL. org and sign in to your [employer] account. Enter A007 in the [facility] box to learn more about \"Learning About Low Back Pain. \"     If you do not have an account, please click on the \"Sign Up Now\" link. Current as of: March 2, 2020               Content Version: 12.6  © 4079-9122 [employer]. Care instructions adapted under license by [facility] ([facility]). If you have questions about a medical condition or this instruction, always ask your healthcare professional. [place] [address] any warranty or liability for your use of this information.

## 2021-02-18 NOTE — PROGRESS NOTES
2021     Yvonne Carcamo (:  1945) is a 76 y.o. female, here for evaluation of the following medical concerns:    No chief complaint on file. HPI    Review of Systems    Prior to Visit Medications    Medication Sig Taking? Authorizing Provider   methylPREDNISolone (MEDROL DOSEPACK) 4 MG tablet Take by mouth. Yes MIKY Fields - CNP   ibuprofen (ADVIL;MOTRIN) 600 MG tablet TAKE ONE TABLET BY MOUTH EVERY 6 HOURS AS NEEDED FOR PAIN  MAK Hernandez MD   traMADol (ULTRAM) 50 MG tablet Take 1 tablet by mouth daily as needed for Pain for up to 30 days.   Mario Bacon MD   beclomethasone (BECONASE-AQ) 80 MCG/ACT AERS nasal spray 2 sprays by Each Nostril route 2 times daily  Historical Provider, MD   amLODIPine (NORVASC) 5 MG tablet Take 1 tablet by mouth daily  Mario Bacno MD   simvastatin (ZOCOR) 40 MG tablet TAKE ONE TABLET BY MOUTH EVERY EVENING  MIKY Fields - CNP   Benralizumab (FASENRA PEN) 30 MG/ML SOAJ Inject 1 each into the skin Every 2 months  Historical Provider, MD   predniSONE (DELTASONE) 5 MG tablet Take 5 mg by mouth daily as needed  Historical Provider, MD   vitamin C (ASCORBIC ACID) 500 MG tablet Take 500 mg by mouth daily  Historical Provider, MD   Omega-3 Fatty Acids (OMEGA-3 FISH OIL) 300 MG CAPS Take 1 capsule by mouth daily  Historical Provider, MD   Vitamin A 2400 MCG (8000 UT) CAPS Take 1 capsule by mouth daily  Historical Provider, MD   lansoprazole (PREVACID) 30 MG delayed release capsule TAKE 1 CAPSULE EVERY DAY  Mario Bacon MD   montelukast (SINGULAIR) 10 MG tablet Take 1 tablet by mouth nightly  Mario Bacon MD   hydrOXYzine (ATARAX) 10 MG tablet TAKE ONE TABLET BY MOUTH THREE TIMES A DAY AS NEEDED FOR ITCHING  Mario Bacon MD   Baclofen 5 MG TABS TAKE ONE TABLET BY MOUTH TWICE A DAY AS NEEDED FOR PAIN  Mario Bacon MD   fexofenadine (ALLEGRA ALLERGY) 180 MG tablet Take 180 mg by mouth daily  Historical Provider, MD ketoconazole (NIZORAL) 2 % cream Apply topically daily. Patient not taking: Reported on 10/6/2020  Davy Garcia MD   desoximetasone (TOPICORT) 0.25 % cream Apply topically 2 times daily. Larry Alejo MD   [facility]) 2 % ointment Apply topically 3 times daily. Larry Alejo MD   Tiotropium Bromide Monohydrate (SPIRIVA RESPIMAT IN) Inhale 2 puffs into the lungs daily   Historical Provider, MD   cromolyn (INTAL) 20 MG/2ML nebulizer solution Take by nebulization as needed   Historical Provider, MD   ALVESCO 160 MCG/ACT AERS daily   Historical Provider, MD   azelastine (ASTELIN) 137 MCG/SPRAY nasal spray 2 sprays by Nasal route daily. Use in each nostril as directed  Larry Alejo MD   albuterol (PROVENTIL HFA;VENTOLIN HFA) 108 (90 BASE) MCG/ACT inhaler Inhale 2 puffs into the lungs. AS NEEDED. Historical Provider, MD   Multiple Vitamins-Minerals (CENTRUM SILVER) TABS Take  by mouth. Historical Provider, MD   Misc Natural Products (OSTEO BI-FLEX ADV TRIPLE ST) TABS Take 2 tablets by mouth daily. Historical Provider, MD   Calcium Carbonate-Vit D-Min (CALTRATE 600+D PLUS) 600-400 MG-UNIT TABS Take 2 tablets by mouth daily. Historical Provider, MD        Social History     Tobacco Use    Smoking status: Never Smoker    Smokeless tobacco: Never Used   Substance Use Topics    Alcohol use: No        There were no vitals filed for this visit. Estimated body mass index is 24.44 kg/m² as calculated from the following:    Height as of 2/26/20: 5' 2\" (1.575 m). Weight as of 12/9/20: 133 lb 9.6 oz (60.6 kg). Physical Exam    ASSESSMENT/PLAN:  1. Acute midline low back pain without sciatica  ***  - methylPREDNISolone (MEDROL DOSEPACK) 4 MG tablet; Take by mouth. Dispense: 1 kit; Refill: 0      Return if symptoms worsen or fail to improve. All questions addresses and answered . Patient agrees with plan of care. An electronic signature was used to authenticate this note. --Brown Aschoff, APRN - CNP on 2/18/2021 at 10:05 AM

## 2021-02-18 NOTE — PROGRESS NOTES
\     2021     Yvonne Carcamo (:  1945) is a 76 y.o. female, here for evaluation of the following medical concerns:    Low mid back pain x 6 days, describes as dull/ache, radiates from sacral region up towards mid lower back region, reports waking with the pain, denies injury, falling, twisting, bending or heavy lifting, loss of bowel or bladder control, numbness, tingling, paraesthesia, saddle anesthesia, or radiation of these symptoms to her lower extremities. Reports no interference with her daily activities. Reports mild pain relief with heat, Ibuprofen 600 mg and OTC lidocaine patches however states \"It doesn't go away\". Rates pain 2-3/10 with medication and 8-9/10 without medication. Currently rates pain 3/10, took Ibuprofen 600mg this am. BP today was 160/78, reports taking medication around 830 am today. Review of Systems   Constitutional: Negative for activity change, fatigue and fever. HENT: Negative. Eyes: Negative. Respiratory: Negative. Cardiovascular: Negative for chest pain, palpitations and leg swelling. Gastrointestinal: Negative for constipation. Endocrine: Negative. Genitourinary: Negative for difficulty urinating, dysuria, flank pain, frequency and urgency. Musculoskeletal: Positive for arthralgias and back pain. Negative for gait problem and joint swelling. Skin: Negative for color change. Allergic/Immunologic: Negative. Neurological: Negative for dizziness, weakness, numbness and headaches. Hematological: Negative. Psychiatric/Behavioral: Negative. Prior to Visit Medications    Medication Sig Taking? Authorizing Provider   methylPREDNISolone (MEDROL DOSEPACK) 4 MG tablet Take by mouth. Yes Marcie Bell APRN - CNP   ibuprofen (ADVIL;MOTRIN) 600 MG tablet TAKE ONE TABLET BY MOUTH EVERY 6 HOURS AS NEEDED FOR PAIN  MAK Reyes MD   traMADol (ULTRAM) 50 MG tablet Take 1 tablet by mouth daily as needed for Pain for up to 30 days.   [facility] Maria Guadalupe Wilson MD   beclomethasone (BECONASE-AQ) 80 MCG/ACT AERS nasal spray 2 sprays by Each Nostril route 2 times daily  Historical Provider, MD   amLODIPine (NORVASC) 5 MG tablet Take 1 tablet by mouth daily  Francesca Fagan MD   simvastatin (ZOCOR) 40 MG tablet TAKE ONE TABLET BY MOUTH EVERY EVENING  Marcie Bell, APRN - CNP   Benralizumab (FASENRA PEN) 30 MG/ML SOAJ Inject 1 each into the skin Every 2 months  Historical Provider, MD   predniSONE (DELTASONE) 5 MG tablet Take 5 mg by mouth daily as needed  Historical Provider, MD   vitamin C (ASCORBIC ACID) 500 MG tablet Take 500 mg by mouth daily  Historical Provider, MD   Omega-3 Fatty Acids (OMEGA-3 FISH OIL) 300 MG CAPS Take 1 capsule by mouth daily  Historical Provider, MD   Vitamin A 2400 MCG (8000 UT) CAPS Take 1 capsule by mouth daily  Historical Provider, MD   lansoprazole (PREVACID) 30 MG delayed release capsule TAKE 1 CAPSULE EVERY DAY  Francesca Fagan MD   montelukast (SINGULAIR) 10 MG tablet Take 1 tablet by mouth nightly  Francesca Fagan MD   hydrOXYzine (ATARAX) 10 MG tablet TAKE ONE TABLET BY MOUTH THREE TIMES A DAY AS NEEDED FOR ITCHING  Francesca Fagan MD   Baclofen 5 MG TABS TAKE ONE TABLET BY MOUTH TWICE A DAY AS NEEDED FOR PAIN  Francesca Fagan MD   fexofenadine (ALLEGRA ALLERGY) 180 MG tablet Take 180 mg by mouth daily  Historical Provider, MD   ketoconazole (NIZORAL) 2 % cream Apply topically daily. Patient not taking: Reported on 10/6/2020  Isauro Wilde MD   desoximetasone (TOPICORT) 0.25 % cream Apply topically 2 times daily. Francesca Fagan MD   mupirocin [facility]) 2 % ointment Apply topically 3 times daily.   Francesca Fagan MD   Tiotropium Bromide Monohydrate (SPIRIVA RESPIMAT IN) Inhale 2 puffs into the lungs daily   Historical Provider, MD   cromolyn (INTAL) 20 MG/2ML nebulizer solution Take by nebulization as needed   Historical Provider, MD   ALVESCO 160 MCG/ACT AERS daily   Historical Provider, MD azelastine (ASTELIN) 137 MCG/SPRAY nasal spray 2 sprays by Nasal route daily. Use in each nostril as directed  Simón Campbell MD   albuterol (PROVENTIL HFA;VENTOLIN HFA) 108 (90 BASE) MCG/ACT inhaler Inhale 2 puffs into the lungs. AS NEEDED. Historical Provider, MD   Multiple Vitamins-Minerals (CENTRUM SILVER) TABS Take  by mouth. Historical Provider, MD   Misc Natural Products (OSTEO BI-FLEX ADV TRIPLE ST) TABS Take 2 tablets by mouth daily. Historical Provider, MD   Calcium Carbonate-Vit D-Min (CALTRATE 600+D PLUS) 600-400 MG-UNIT TABS Take 2 tablets by mouth daily. Historical Provider, MD        Social History     Tobacco Use    Smoking status: Never Smoker    Smokeless tobacco: Never Used   Substance Use Topics    Alcohol use: No        Vitals:    02/18/21 1037   BP: (!) 160/78   Site: Right Upper Arm   Position: Sitting   Cuff Size: Small Adult   Pulse: 84   Resp: 18   Temp: 97 °F (36.1 °C)   TempSrc: Infrared   SpO2: 98%     Estimated body mass index is 24.44 kg/m² as calculated from the following:    Height as of 2/26/20: 5' 2\" (1.575 m). Weight as of 12/9/20: 133 lb 9.6 oz (60.6 kg). Physical Exam  Vitals signs reviewed. Constitutional:       General: She is not in acute distress. Appearance: Normal appearance. She is not ill-appearing or toxic-appearing. HENT:      Head: Normocephalic and atraumatic. Nose: Nose normal.   Eyes:      Pupils: Pupils are equal, round, and reactive to light. Neck:      Musculoskeletal: Normal range of motion. Vascular: No carotid bruit. Cardiovascular:      Rate and Rhythm: Normal rate and regular rhythm. Pulses: Normal pulses. Heart sounds: Murmur present. Systolic murmur present. Pulmonary:      Effort: Pulmonary effort is normal.      Breath sounds: Normal breath sounds. Abdominal:      General: Bowel sounds are normal.      Palpations: Abdomen is soft. Musculoskeletal: Normal range of motion.          General: Tenderness present. No swelling, deformity or signs of injury. Lumbar back: She exhibits tenderness and pain. She exhibits normal range of motion, no swelling and no deformity. Right lower leg: No edema. Left lower leg: No edema. Comments: Pain to Lower mid back, pain radiates from sacral region up to mid back region, Patient rates pain at this time 3/10. Patient able to ambulate and transfer to the bed without assistance or difficulty, normal gait and movement noted. Patient with no bruising, rashes, deformitites noted to lower back region. Skin:     General: Skin is warm and dry. Capillary Refill: Capillary refill takes less than 2 seconds. Neurological:      General: No focal deficit present. Mental Status: She is alert and oriented to person, place, and time. Sensory: Sensation is intact. No sensory deficit. Motor: Motor function is intact. No weakness. Coordination: Coordination is intact. Coordination normal.      Gait: Gait is intact. Gait normal.   Psychiatric:         Mood and Affect: Mood normal.         Behavior: Behavior normal.         Thought Content: Thought content normal.         Judgment: Judgment normal.         ASSESSMENT/PLAN:  1. Acute midline low back pain without sciatica    Continue light stretching until feeling better, given instructions with discharge paperwork and reviewed stretching exercises with patient    Continue activity as tolerated    Continue to use heat to site at 20 minute increments, Ibuprofen 600 mg as prescribed and over the counter lidocaine patch as needed for pain relief    Discussed a referral to physical therapy if pain persist or unrelieved    Start taking:  - methylPREDNISolone (MEDROL DOSEPACK) 4 MG tablet; Take by mouth. Dispense: 1 kit; Refill: 0      Return if symptoms worsen or fail to improve. An electronic signature was used to authenticate this note.     --Shanna Cortez on 2/18/2021 at 11:48 AM

## 2021-02-25 DIAGNOSIS — G25.81 RLS (RESTLESS LEGS SYNDROME): ICD-10-CM

## 2021-02-25 RX ORDER — TRAMADOL HYDROCHLORIDE 50 MG/1
TABLET ORAL
Qty: 60 TABLET | Refills: 1 | Status: SHIPPED | OUTPATIENT
Start: 2021-02-25 | End: 2021-04-19

## 2021-02-25 NOTE — TELEPHONE ENCOUNTER
LAST OV 2/18/2021  Future Appointments   Date Time Provider Lex Mcdonnell   3/15/2021  9:30 AM Douglas Ortiz MD [facility] [place]

## 2021-03-15 ENCOUNTER — OFFICE VISIT (OUTPATIENT)
Dept: INTERNAL MEDICINE CLINIC | Age: 76
End: 2021-03-15
Payer: MEDICARE

## 2021-03-15 VITALS
HEIGHT: 62 IN | OXYGEN SATURATION: 99 % | HEART RATE: 84 BPM | SYSTOLIC BLOOD PRESSURE: 122 MMHG | BODY MASS INDEX: 24.11 KG/M2 | DIASTOLIC BLOOD PRESSURE: 56 MMHG | TEMPERATURE: 97.1 F | WEIGHT: 131 LBS

## 2021-03-15 DIAGNOSIS — E78.2 MIXED HYPERLIPIDEMIA: ICD-10-CM

## 2021-03-15 DIAGNOSIS — M85.89 OSTEOPENIA OF MULTIPLE SITES: ICD-10-CM

## 2021-03-15 DIAGNOSIS — I07.1 MILD TRICUSPID REGURGITATION: ICD-10-CM

## 2021-03-15 DIAGNOSIS — K21.9 GASTROESOPHAGEAL REFLUX DISEASE WITHOUT ESOPHAGITIS: ICD-10-CM

## 2021-03-15 DIAGNOSIS — I10 ESSENTIAL HYPERTENSION: Primary | ICD-10-CM

## 2021-03-15 DIAGNOSIS — J45.30 MILD PERSISTENT ASTHMA WITHOUT COMPLICATION: ICD-10-CM

## 2021-03-15 DIAGNOSIS — G25.81 RLS (RESTLESS LEGS SYNDROME): ICD-10-CM

## 2021-03-15 DIAGNOSIS — I34.0 MODERATE MITRAL REGURGITATION: ICD-10-CM

## 2021-03-15 LAB
A/G RATIO: 1.7 (ref 1.1–2.2)
ALBUMIN SERPL-MCNC: 4 G/DL (ref 3.4–5)
ALP BLD-CCNC: 79 U/L (ref 40–129)
ALT SERPL-CCNC: 18 U/L (ref 10–40)
ANION GAP SERPL CALCULATED.3IONS-SCNC: 14 MMOL/L (ref 3–16)
AST SERPL-CCNC: 24 U/L (ref 15–37)
BILIRUB SERPL-MCNC: <0.2 MG/DL (ref 0–1)
BUN BLDV-MCNC: 13 MG/DL (ref 7–20)
CALCIUM SERPL-MCNC: 9.8 MG/DL (ref 8.3–10.6)
CHLORIDE BLD-SCNC: 105 MMOL/L (ref 99–110)
CO2: 24 MMOL/L (ref 21–32)
CREAT SERPL-MCNC: 0.6 MG/DL (ref 0.6–1.2)
GFR AFRICAN AMERICAN: >60
GFR NON-AFRICAN AMERICAN: >60
GLOBULIN: 2.4 G/DL
GLUCOSE BLD-MCNC: 92 MG/DL (ref 70–99)
LDL CHOLESTEROL DIRECT: 110 MG/DL
POTASSIUM SERPL-SCNC: 3.9 MMOL/L (ref 3.5–5.1)
SODIUM BLD-SCNC: 143 MMOL/L (ref 136–145)
TOTAL PROTEIN: 6.4 G/DL (ref 6.4–8.2)

## 2021-03-15 PROCEDURE — 36415 COLL VENOUS BLD VENIPUNCTURE: CPT | Performed by: INTERNAL MEDICINE

## 2021-03-15 PROCEDURE — G8484 FLU IMMUNIZE NO ADMIN: HCPCS | Performed by: INTERNAL MEDICINE

## 2021-03-15 PROCEDURE — G8420 CALC BMI NORM PARAMETERS: HCPCS | Performed by: INTERNAL MEDICINE

## 2021-03-15 PROCEDURE — 99214 OFFICE O/P EST MOD 30 MIN: CPT | Performed by: INTERNAL MEDICINE

## 2021-03-15 PROCEDURE — 1123F ACP DISCUSS/DSCN MKR DOCD: CPT | Performed by: INTERNAL MEDICINE

## 2021-03-15 PROCEDURE — 1036F TOBACCO NON-USER: CPT | Performed by: INTERNAL MEDICINE

## 2021-03-15 PROCEDURE — 1090F PRES/ABSN URINE INCON ASSESS: CPT | Performed by: INTERNAL MEDICINE

## 2021-03-15 PROCEDURE — 4040F PNEUMOC VAC/ADMIN/RCVD: CPT | Performed by: INTERNAL MEDICINE

## 2021-03-15 PROCEDURE — G8399 PT W/DXA RESULTS DOCUMENT: HCPCS | Performed by: INTERNAL MEDICINE

## 2021-03-15 PROCEDURE — G8427 DOCREV CUR MEDS BY ELIG CLIN: HCPCS | Performed by: INTERNAL MEDICINE

## 2021-03-15 PROCEDURE — 3017F COLORECTAL CA SCREEN DOC REV: CPT | Performed by: INTERNAL MEDICINE

## 2021-03-15 ASSESSMENT — ENCOUNTER SYMPTOMS
BLURRED VISION: 0
COUGH: 0
ORTHOPNEA: 0
HEARTBURN: 0
SHORTNESS OF BREATH: 0
DIFFICULTY BREATHING: 0
NAUSEA: 0
SORE THROAT: 0
GLOBUS SENSATION: 0
BELCHING: 0
ABDOMINAL PAIN: 0
FREQUENT THROAT CLEARING: 0
CHOKING: 0
CHEST TIGHTNESS: 0
WHEEZING: 0
SPUTUM PRODUCTION: 0
HEMOPTYSIS: 0
HOARSE VOICE: 0

## 2021-03-15 ASSESSMENT — PATIENT HEALTH QUESTIONNAIRE - PHQ9
SUM OF ALL RESPONSES TO PHQ QUESTIONS 1-9: 0
1. LITTLE INTEREST OR PLEASURE IN DOING THINGS: 0
SUM OF ALL RESPONSES TO PHQ QUESTIONS 1-9: 0

## 2021-03-15 NOTE — PROGRESS NOTES
Subjective:    Here for recheck of htn, hld,gerd, asthma and rls. Patient ID: Salma Michel is a 76 y.o. female. Hypertension  This is a chronic problem. The current episode started more than 1 year ago. The problem is unchanged. The problem is controlled. Pertinent negatives include no blurred vision, chest pain, malaise/fatigue, orthopnea, palpitations, peripheral edema, PND, shortness of breath or sweats. There are no associated agents to hypertension. Risk factors for coronary artery disease include dyslipidemia and post-menopausal state. Past treatments include ACE inhibitors and calcium channel blockers. The current treatment provides significant improvement. There are no compliance problems. Hyperlipidemia  This is a chronic problem. The current episode started more than 1 year ago. The problem is controlled. Recent lipid tests were reviewed and are normal. There are no known factors aggravating her hyperlipidemia. Pertinent negatives include no chest pain, focal sensory loss, focal weakness, leg pain, myalgias or shortness of breath. Current antihyperlipidemic treatment includes statins. The current treatment provides significant improvement of lipids. There are no compliance problems. Risk factors for coronary artery disease include dyslipidemia, hypertension and post-menopausal.   Gastroesophageal Reflux  She reports no abdominal pain, no belching, no chest pain, no choking, no coughing, no dysphagia, no early satiety, no globus sensation, no heartburn, no hoarse voice, no nausea, no sore throat or no wheezing. This is a chronic problem. The current episode started more than 1 year ago. The problem occurs rarely. The problem has been unchanged. Nothing aggravates the symptoms. Pertinent negatives include no anemia, fatigue, melena, muscle weakness, orthopnea or weight loss. There are no known risk factors. She has tried a PPI for the symptoms. The treatment provided significant relief.  Past for focal weakness, syncope and light-headedness. Prior to Visit Medications    Medication Sig Taking? Authorizing Provider   traMADol (ULTRAM) 50 MG tablet TAKE ONE TABLET BY MOUTH TWICE A DAY REDUCE DOSE AS PAIN BECOMES MANAGEABLE Yes Geo Ashton MD   ibuprofen (ADVIL;MOTRIN) 600 MG tablet TAKE ONE TABLET BY MOUTH EVERY 6 HOURS AS NEEDED FOR PAIN Yes Geo Ashton MD   beclomethasone (BECONASE-AQ) 80 MCG/ACT AERS nasal spray 2 sprays by Each Nostril route 2 times daily Yes Historical Provider, MD   amLODIPine (NORVASC) 5 MG tablet Take 1 tablet by mouth daily Yes Geo Ashton MD   simvastatin (ZOCOR) 40 MG tablet TAKE ONE TABLET BY MOUTH EVERY EVENING Yes MIKY Dash CNP   Benralizumab (FASENRA PEN) 30 MG/ML SOAJ Inject 1 each into the skin Every 2 months Yes Historical Provider, MD   predniSONE (DELTASONE) 5 MG tablet Take 5 mg by mouth daily as needed Yes Historical Provider, MD   vitamin C (ASCORBIC ACID) 500 MG tablet Take 500 mg by mouth daily Yes Historical Provider, MD   Omega-3 Fatty Acids (OMEGA-3 FISH OIL) 300 MG CAPS Take 1 capsule by mouth daily Yes Historical Provider, MD   Vitamin A 2400 MCG (8000 UT) CAPS Take 1 capsule by mouth daily Yes Historical Provider, MD   lansoprazole (PREVACID) 30 MG delayed release capsule TAKE 1 CAPSULE EVERY DAY Yes Geo Ashton MD   montelukast (SINGULAIR) 10 MG tablet Take 1 tablet by mouth nightly Yes Geo Ashton MD   hydrOXYzine (ATARAX) 10 MG tablet TAKE ONE TABLET BY MOUTH THREE TIMES A DAY AS NEEDED FOR ITCHING Yes Geo Ashton MD   fexofenadine (ALLEGRA ALLERGY) 180 MG tablet Take 180 mg by mouth daily Yes Historical Provider, MD   desoximetasone (TOPICORT) 0.25 % cream Apply topically 2 times daily. Yes MD deanna Rodriguezpirocin [facility]) 2 % ointment Apply topically 3 times daily.  Yes Geo Ashton MD   Tiotropium Bromide Monohydrate (SPIRIVA RESPIMAT IN) Inhale 2 puffs into the lungs daily Yes Historical Provider, MD   ALVESCO 160 MCG/ACT AERS daily  Yes Historical Provider, MD   azelastine (ASTELIN) 137 MCG/SPRAY nasal spray 2 sprays by Nasal route daily. Use in each nostril as directed Yes Mike Guardado MD   albuterol (PROVENTIL HFA;VENTOLIN HFA) 108 (90 BASE) MCG/ACT inhaler Inhale 2 puffs into the lungs. AS NEEDED. Yes Historical Provider, MD   Multiple Vitamins-Minerals (CENTRUM SILVER) TABS Take  by mouth. Yes Historical Provider, MD   Misc Natural Products (OSTEO BI-FLEX ADV TRIPLE ST) TABS Take 2 tablets by mouth daily. Yes Historical Provider, MD   Calcium Carbonate-Vit D-Min (CALTRATE 600+D PLUS) 600-400 MG-UNIT TABS Take 2 tablets by mouth daily. Yes Historical Provider, MD   Baclofen 5 MG TABS TAKE ONE TABLET BY MOUTH TWICE A DAY AS NEEDED FOR PAIN  M Soy Da Silva MD       BP (!) 122/56   Pulse 84   Temp 97.1 °F (36.2 °C) (Temporal)   Ht 5' 2\" (1.575 m)   Wt 131 lb (59.4 kg)   SpO2 99%   BMI 23.96 kg/m²     Objective:   Physical Exam  Vitals signs reviewed. Constitutional:       General: She is not in acute distress. Appearance: She is well-developed and normal weight. She is not diaphoretic. Neck:      Musculoskeletal: Normal range of motion and neck supple. Thyroid: No thyromegaly. Vascular: No JVD. Comments: + bilat bruit  Cardiovascular:      Rate and Rhythm: Normal rate and regular rhythm. Heart sounds: Murmur present. Systolic murmur present with a grade of 2/6. No friction rub. No gallop. Pulmonary:      Effort: Pulmonary effort is normal. No respiratory distress. Breath sounds: Normal breath sounds. No wheezing, rhonchi or rales. Musculoskeletal:      Right lower leg: No edema. Left lower leg: No edema. Skin:     General: Skin is warm and dry. Findings: No erythema. Neurological:      Mental Status: She is alert and oriented to person, place, and time. Assessment:      1. Essential hypertension    2. Mixed hyperlipidemia    3. Gastroesophageal reflux disease without esophagitis    4. Mild persistent asthma without complication    5. Moderate mitral regurgitation    6. Mild tricuspid regurgitation    7. RLS (restless legs syndrome)    8. Osteopenia of multiple sites              Plan:      Javy Edmonds was seen today for 3 month follow-up and hypertension. Diagnoses and all orders for this visit:    Essential hypertension:  Stable,  cont same meds  -     Comprehensive Metabolic Panel; Future    Mixed hyperlipidemia:  Check labs, adjust med prn  -     Comprehensive Metabolic Panel; Future  -     LDL Cholesterol, Direct; Future    Gastroesophageal reflux disease without esophagitis:  Stable,  cont same meds  -     Comprehensive Metabolic Panel; Future    Mild persistent asthma without complication:  Stable,  cont same meds    Moderate mitral regurgitation  -     ECHO Complete 2D W Doppler W Color; Future    Mild tricuspid regurgitation  -     ECHO Complete 2D W Doppler W Color; Future    RLS (restless legs syndrome): Stable,  cont same meds    Osteopenia of multiple sites:  Stable,  cont same meds        Controlled Substance Monitoring:    Acute and Chronic Pain Monitoring:   RX Monitoring 3/15/2021   Attestation -   Periodic Controlled Substance Monitoring No signs of potential drug abuse or diversion identified. Chronic Pain > 50 MEDD Obtained or confirmed \"Consent for Opioid Use\" on file.    Chronic Pain > 80 MEDD -     3 months

## 2021-04-16 ENCOUNTER — HOSPITAL ENCOUNTER (OUTPATIENT)
Dept: NON INVASIVE DIAGNOSTICS | Age: 76
Discharge: HOME | End: 2021-04-16
Payer: MEDICARE

## 2021-04-16 DIAGNOSIS — I34.0 MODERATE MITRAL REGURGITATION: ICD-10-CM

## 2021-04-16 DIAGNOSIS — I07.1 MILD TRICUSPID REGURGITATION: ICD-10-CM

## 2021-04-16 LAB
LV EF: 58 %
LVEF MODALITY: NORMAL

## 2021-04-16 PROCEDURE — 93306 TTE W/DOPPLER COMPLETE: CPT

## 2021-04-19 DIAGNOSIS — G25.81 RLS (RESTLESS LEGS SYNDROME): ICD-10-CM

## 2021-04-19 RX ORDER — TRAMADOL HYDROCHLORIDE 50 MG/1
TABLET ORAL
Qty: 60 TABLET | Refills: 0 | Status: SHIPPED | OUTPATIENT
Start: 2021-04-19 | End: 2021-05-20

## 2021-04-19 NOTE — TELEPHONE ENCOUNTER
Request tramadol last filled 2/25/21 with refill           Last ov 3/15/21                                                            Next ov 6/21/21

## 2021-04-22 ASSESSMENT — LIFESTYLE VARIABLES
HOW OFTEN DO YOU HAVE A DRINK CONTAINING ALCOHOL: NEVER
AUDIT-C TOTAL SCORE: INCOMPLETE
AUDIT TOTAL SCORE: INCOMPLETE
HOW OFTEN DO YOU HAVE A DRINK CONTAINING ALCOHOL: 0

## 2021-04-22 ASSESSMENT — PATIENT HEALTH QUESTIONNAIRE - PHQ9
2. FEELING DOWN, DEPRESSED OR HOPELESS: 1
SUM OF ALL RESPONSES TO PHQ QUESTIONS 1-9: 2

## 2021-04-27 ENCOUNTER — VIRTUAL VISIT (OUTPATIENT)
Dept: INTERNAL MEDICINE CLINIC | Age: 76
End: 2021-04-27
Payer: MEDICARE

## 2021-04-27 DIAGNOSIS — Z00.00 ROUTINE GENERAL MEDICAL EXAMINATION AT A HEALTH CARE FACILITY: Primary | ICD-10-CM

## 2021-04-27 PROCEDURE — 4040F PNEUMOC VAC/ADMIN/RCVD: CPT | Performed by: NURSE PRACTITIONER

## 2021-04-27 PROCEDURE — G0438 PPPS, INITIAL VISIT: HCPCS | Performed by: NURSE PRACTITIONER

## 2021-04-27 PROCEDURE — 1123F ACP DISCUSS/DSCN MKR DOCD: CPT | Performed by: NURSE PRACTITIONER

## 2021-04-27 PROCEDURE — 3017F COLORECTAL CA SCREEN DOC REV: CPT | Performed by: NURSE PRACTITIONER

## 2021-04-27 NOTE — PATIENT INSTRUCTIONS
Personalized Preventive Plan for Yvonne Carcamo - 4/27/2021  Medicare offers a range of preventive health benefits. Some of the tests and screenings are paid in full while other may be subject to a deductible, co-insurance, and/or copay. Some of these benefits include a comprehensive review of your medical history including lifestyle, illnesses that may run in your family, and various assessments and screenings as appropriate. After reviewing your medical record and screening and assessments performed today your provider may have ordered immunizations, labs, imaging, and/or referrals for you. A list of these orders (if applicable) as well as your Preventive Care list are included within your After Visit Summary for your review. Other Preventive Recommendations:    · A preventive eye exam performed by an eye specialist is recommended every 1-2 years to screen for glaucoma; cataracts, macular degeneration, and other eye disorders. · A preventive dental visit is recommended every 6 months. · Try to get at least 150 minutes of exercise per week or 10,000 steps per day on a pedometer . · Order or download the FREE \"Exercise & Physical Activity: Your Everyday Guide\" from [employer]. Call 2-548.760.2379 or search [employer] online. · You need 7446-4847 mg of calcium and 7412-2568 IU of vitamin D per day. It is possible to meet your calcium requirement with diet alone, but a vitamin D supplement is usually necessary to meet this goal.  · When exposed to the sun, use a sunscreen that protects against both UVA and UVB radiation with an SPF of 30 or greater. Reapply every 2 to 3 hours or after sweating, drying off with a towel, or swimming. · Always wear a seat belt when traveling in a car. Always wear a helmet when riding a bicycle or motorcycle.

## 2021-05-20 DIAGNOSIS — G25.81 RLS (RESTLESS LEGS SYNDROME): ICD-10-CM

## 2021-05-20 RX ORDER — TRAMADOL HYDROCHLORIDE 50 MG/1
TABLET ORAL
Qty: 60 TABLET | Refills: 0 | Status: SHIPPED | OUTPATIENT
Start: 2021-05-20 | End: 2021-06-17

## 2021-05-20 NOTE — TELEPHONE ENCOUNTER
Last office visit :03/15/2021    Future Appointments   Date Time Provider Lex Mcdonnell   6/21/2021  8:30 AM Fanny Richardson MD [facility]

## 2021-05-21 DIAGNOSIS — M54.2 NECK PAIN: ICD-10-CM

## 2021-05-23 DIAGNOSIS — R21 RASH: ICD-10-CM

## 2021-05-24 RX ORDER — HYDROXYZINE HYDROCHLORIDE 10 MG/1
TABLET, FILM COATED ORAL
Qty: 30 TABLET | Refills: 4 | Status: SHIPPED | OUTPATIENT
Start: 2021-05-24 | End: 2021-07-30 | Stop reason: SDUPTHER

## 2021-05-24 RX ORDER — BACLOFEN 5 MG/1
TABLET ORAL
Qty: 30 TABLET | Refills: 4 | Status: SHIPPED | OUTPATIENT
Start: 2021-05-24 | End: 2022-01-31 | Stop reason: SDUPTHER

## 2021-06-02 RX ORDER — MONTELUKAST SODIUM 10 MG/1
TABLET ORAL
Qty: 90 TABLET | Refills: 3 | Status: SHIPPED | OUTPATIENT
Start: 2021-06-02

## 2021-06-02 NOTE — TELEPHONE ENCOUNTER
Last office visit : 03/15/21    Future Appointments   Date Time Provider Lex Mcdonnell   6/21/2021  8:30 AM Douglas Ortiz MD [facility]

## 2021-06-17 DIAGNOSIS — G25.81 RLS (RESTLESS LEGS SYNDROME): ICD-10-CM

## 2021-06-17 RX ORDER — TRAMADOL HYDROCHLORIDE 50 MG/1
TABLET ORAL
Qty: 60 TABLET | Refills: 0 | Status: SHIPPED | OUTPATIENT
Start: 2021-06-17 | End: 2021-07-17

## 2021-06-21 ENCOUNTER — OFFICE VISIT (OUTPATIENT)
Dept: INTERNAL MEDICINE CLINIC | Age: 76
End: 2021-06-21
Payer: MEDICARE

## 2021-06-21 VITALS
HEIGHT: 62 IN | DIASTOLIC BLOOD PRESSURE: 60 MMHG | HEART RATE: 91 BPM | RESPIRATION RATE: 16 BRPM | WEIGHT: 130 LBS | BODY MASS INDEX: 23.92 KG/M2 | SYSTOLIC BLOOD PRESSURE: 130 MMHG | OXYGEN SATURATION: 96 %

## 2021-06-21 DIAGNOSIS — M85.89 OSTEOPENIA OF MULTIPLE SITES: ICD-10-CM

## 2021-06-21 DIAGNOSIS — E78.2 MIXED HYPERLIPIDEMIA: ICD-10-CM

## 2021-06-21 DIAGNOSIS — I34.0 MODERATE MITRAL REGURGITATION: ICD-10-CM

## 2021-06-21 DIAGNOSIS — I10 ESSENTIAL HYPERTENSION: Primary | ICD-10-CM

## 2021-06-21 DIAGNOSIS — I07.1 MILD TRICUSPID REGURGITATION: ICD-10-CM

## 2021-06-21 DIAGNOSIS — K21.9 GASTROESOPHAGEAL REFLUX DISEASE WITHOUT ESOPHAGITIS: ICD-10-CM

## 2021-06-21 DIAGNOSIS — G25.81 RLS (RESTLESS LEGS SYNDROME): ICD-10-CM

## 2021-06-21 DIAGNOSIS — J45.30 MILD PERSISTENT ASTHMA WITHOUT COMPLICATION: ICD-10-CM

## 2021-06-21 PROCEDURE — 99214 OFFICE O/P EST MOD 30 MIN: CPT | Performed by: INTERNAL MEDICINE

## 2021-06-21 PROCEDURE — 1090F PRES/ABSN URINE INCON ASSESS: CPT | Performed by: INTERNAL MEDICINE

## 2021-06-21 PROCEDURE — 3017F COLORECTAL CA SCREEN DOC REV: CPT | Performed by: INTERNAL MEDICINE

## 2021-06-21 PROCEDURE — G8399 PT W/DXA RESULTS DOCUMENT: HCPCS | Performed by: INTERNAL MEDICINE

## 2021-06-21 PROCEDURE — 1036F TOBACCO NON-USER: CPT | Performed by: INTERNAL MEDICINE

## 2021-06-21 PROCEDURE — 1123F ACP DISCUSS/DSCN MKR DOCD: CPT | Performed by: INTERNAL MEDICINE

## 2021-06-21 PROCEDURE — 4040F PNEUMOC VAC/ADMIN/RCVD: CPT | Performed by: INTERNAL MEDICINE

## 2021-06-21 PROCEDURE — G8420 CALC BMI NORM PARAMETERS: HCPCS | Performed by: INTERNAL MEDICINE

## 2021-06-21 PROCEDURE — G8427 DOCREV CUR MEDS BY ELIG CLIN: HCPCS | Performed by: INTERNAL MEDICINE

## 2021-06-21 RX ORDER — LANSOPRAZOLE 30 MG/1
CAPSULE, DELAYED RELEASE ORAL
Qty: 90 CAPSULE | Refills: 3 | Status: SHIPPED | OUTPATIENT
Start: 2021-06-21 | End: 2022-07-29 | Stop reason: SDUPTHER

## 2021-06-21 SDOH — ECONOMIC STABILITY: FOOD INSECURITY: WITHIN THE PAST 12 MONTHS, YOU WORRIED THAT YOUR FOOD WOULD RUN OUT BEFORE YOU GOT MONEY TO BUY MORE.: NEVER TRUE

## 2021-06-21 SDOH — ECONOMIC STABILITY: FOOD INSECURITY: WITHIN THE PAST 12 MONTHS, THE FOOD YOU BOUGHT JUST DIDN'T LAST AND YOU DIDN'T HAVE MONEY TO GET MORE.: NEVER TRUE

## 2021-06-21 ASSESSMENT — ENCOUNTER SYMPTOMS
HEARTBURN: 0
BELCHING: 0
WHEEZING: 0
CHOKING: 0
SORE THROAT: 0
HEMOPTYSIS: 0
BLURRED VISION: 0
HOARSE VOICE: 0
SPUTUM PRODUCTION: 0
FREQUENT THROAT CLEARING: 0
ORTHOPNEA: 0
DIFFICULTY BREATHING: 0
CHEST TIGHTNESS: 0
NAUSEA: 0
ABDOMINAL PAIN: 0
SHORTNESS OF BREATH: 0
COUGH: 0
GLOBUS SENSATION: 0

## 2021-06-21 ASSESSMENT — SOCIAL DETERMINANTS OF HEALTH (SDOH): HOW HARD IS IT FOR YOU TO PAY FOR THE VERY BASICS LIKE FOOD, HOUSING, MEDICAL CARE, AND HEATING?: NOT HARD AT ALL

## 2021-06-21 NOTE — PROGRESS NOTES
Subjective:    Here for recheck of htn, hld,gerd, asthma and rls. Patient ID: Guerrero Kent is a 76 y.o. female. Hypertension  This is a chronic problem. The current episode started more than 1 year ago. The problem is unchanged. The problem is controlled. Pertinent negatives include no blurred vision, chest pain, malaise/fatigue, orthopnea, palpitations, peripheral edema, PND, shortness of breath or sweats. There are no associated agents to hypertension. Risk factors for coronary artery disease include dyslipidemia and post-menopausal state. Past treatments include ACE inhibitors and calcium channel blockers. The current treatment provides significant improvement. There are no compliance problems. Hyperlipidemia  This is a chronic problem. The current episode started more than 1 year ago. The problem is controlled. Recent lipid tests were reviewed and are normal. There are no known factors aggravating her hyperlipidemia. Pertinent negatives include no chest pain, focal sensory loss, focal weakness, leg pain, myalgias or shortness of breath. Current antihyperlipidemic treatment includes statins. The current treatment provides significant improvement of lipids. There are no compliance problems. Risk factors for coronary artery disease include dyslipidemia, hypertension and post-menopausal.   Gastroesophageal Reflux  She reports no abdominal pain, no belching, no chest pain, no choking, no coughing, no dysphagia, no early satiety, no globus sensation, no heartburn, no hoarse voice, no nausea, no sore throat or no wheezing. This is a chronic problem. The current episode started more than 1 year ago. The problem occurs rarely. The problem has been unchanged. Nothing aggravates the symptoms. Pertinent negatives include no anemia, fatigue, melena, muscle weakness, orthopnea or weight loss. There are no known risk factors. She has tried a PPI for the symptoms. The treatment provided significant relief.  Past procedures include an EGD. Asthma  There is no chest tightness, cough, difficulty breathing, frequent throat clearing, hemoptysis, hoarse voice, shortness of breath, sputum production or wheezing. This is a chronic problem. The current episode started more than 1 year ago. The problem occurs rarely. The problem has been unchanged (doing well on fasenra). Pertinent negatives include no chest pain, dyspnea on exertion, fever, heartburn, malaise/fatigue, myalgias, orthopnea, PND, sore throat, sweats or weight loss. Her symptoms are aggravated by pollen. Her symptoms are alleviated by beta-agonist, steroid inhaler and ipratropium (fasenra). She reports complete improvement on treatment. Her past medical history is significant for asthma and pneumonia. Using tramadol for rls. Completing adls. Good relief. Using 1.5-2 tab daily. Sleeping well. Never using more than 2 daily. Getting exercise    Recent echo with good ef.  70%  Mod mr and mild tr. Unchanged. 2019. Recent echo only showed aortic calcifications. Carotid bruits:  Recent doppler normal.      Has been off chronic prednisone for over about 1 yr. Recent dexa only showed osteopenia. Now off fosamax. Continues on ca and vit d. Getting regular exercise. Asthma under great control with current meds. Recent flare but easily controlled and now resolved. Review of Systems   Constitutional: Negative for fatigue, fever, malaise/fatigue and weight loss. HENT: Negative for hoarse voice and sore throat. Eyes: Negative for blurred vision. Respiratory: Negative for cough, hemoptysis, sputum production, choking, shortness of breath and wheezing. Cardiovascular: Negative for chest pain, dyspnea on exertion, palpitations, orthopnea, leg swelling and PND. Gastrointestinal: Negative for abdominal pain, dysphagia, heartburn, melena and nausea. Musculoskeletal: Negative for arthralgias, joint swelling, myalgias and muscle weakness. Neurological: Negative for focal weakness, syncope and light-headedness. Prior to Visit Medications    Medication Sig Taking? Authorizing Provider   lansoprazole (PREVACID) 30 MG delayed release capsule TAKE 1 CAPSULE EVERY DAY Yes MAK Barentt MD   traMADol (ULTRAM) 50 MG tablet TAKE ONE TABLET BY MOUTH TWICE A DAY AS NEEDED. REDUCE DOSES TAKEN AS PAIN BECOMES MANAGEABLE Yes Zahira Faye MD   montelukast (SINGULAIR) 10 MG tablet TAKE 1 TABLET EVERY NIGHT Yes Zahira Faye MD   Baclofen (LIORESAL) 5 MG tablet TAKE ONE TABLET BY MOUTH TWICE A DAY AS NEEDED FOR PAIN Yes Zahira Faye MD   hydrOXYzine (ATARAX) 10 MG tablet TAKE ONE TABLET BY MOUTH THREE TIMES A DAY AS NEEDED FOR ITCHING Yes Zahira Faye MD   ibuprofen (ADVIL;MOTRIN) 600 MG tablet TAKE ONE TABLET BY MOUTH EVERY 6 HOURS AS NEEDED FOR PAIN Yes Zahira Faye MD   beclomethasone (BECONASE-AQ) 80 MCG/ACT AERS nasal spray 2 sprays by Each Nostril route 2 times daily Yes Historical Provider, MD   amLODIPine (NORVASC) 5 MG tablet Take 1 tablet by mouth daily Yes Zahira Faye MD   simvastatin (ZOCOR) 40 MG tablet TAKE ONE TABLET BY MOUTH EVERY EVENING Yes Marcie Bell, APRN - CNP   Benralizumab (FASENRA PEN) 30 MG/ML SOAJ Inject 1 each into the skin Every 2 months Yes Historical Provider, MD   vitamin C (ASCORBIC ACID) 500 MG tablet Take 500 mg by mouth daily Yes Historical Provider, MD   Omega-3 Fatty Acids (OMEGA-3 FISH OIL) 300 MG CAPS Take 1 capsule by mouth daily Yes Historical Provider, MD   Vitamin A 2400 MCG (8000 UT) CAPS Take 1 capsule by mouth daily Yes Historical Provider, MD   fexofenadine (ALLEGRA ALLERGY) 180 MG tablet Take 180 mg by mouth daily Yes Historical Provider, MD   desoximetasone (TOPICORT) 0.25 % cream Apply topically 2 times daily. Yes MD deanna Willispirocin [facility]) 2 % ointment Apply topically 3 times daily.  Yes Zahira Faye MD   Tiotropium Bromide Monohydrate (SPIRIVA RESPIMAT IN) Inhale 2 puffs into the lungs daily  Yes Historical Provider, MD   ALVESCO 160 MCG/ACT AERS daily  Yes Historical Provider, MD   azelastine (ASTELIN) 137 MCG/SPRAY nasal spray 2 sprays by Nasal route daily. Use in each nostril as directed Yes Malcom Toan, MD   albuterol (PROVENTIL HFA;VENTOLIN HFA) 108 (90 BASE) MCG/ACT inhaler Inhale 2 puffs into the lungs. AS NEEDED. Yes Historical Provider, MD   Multiple Vitamins-Minerals (CENTRUM SILVER) TABS Take  by mouth. Yes Historical Provider, MD   Misc Natural Products (OSTEO BI-FLEX ADV TRIPLE ST) TABS Take 2 tablets by mouth daily. Yes Historical Provider, MD   Calcium Carbonate-Vit D-Min (CALTRATE 600+D PLUS) 600-400 MG-UNIT TABS Take 2 tablets by mouth daily. Yes Historical Provider, MD       /60 (Site: Left Upper Arm, Position: Sitting, Cuff Size: Medium Adult)   Pulse 91   Resp 16   Ht 5' 2\" (1.575 m)   Wt 130 lb (59 kg)   SpO2 96%   Breastfeeding No   BMI 23.78 kg/m²     Objective:   Physical Exam  Vitals reviewed. Constitutional:       General: She is not in acute distress. Appearance: She is well-developed and normal weight. She is not diaphoretic. Neck:      Thyroid: No thyromegaly. Vascular: No JVD. Comments: + bilat bruit  Cardiovascular:      Rate and Rhythm: Normal rate and regular rhythm. Heart sounds: Murmur heard. Systolic murmur is present with a grade of 2/6. No friction rub. No gallop. Pulmonary:      Effort: Pulmonary effort is normal. No respiratory distress. Breath sounds: Normal breath sounds. No wheezing, rhonchi or rales. Musculoskeletal:      Right lower leg: No edema. Left lower leg: No edema. Skin:     General: Skin is warm and dry. Findings: No erythema. Neurological:      Mental Status: She is alert and oriented to person, place, and time. Assessment:      1. Essential hypertension    2. Mixed hyperlipidemia    3.  Gastroesophageal reflux disease without esophagitis    4. Mild persistent asthma without complication    5. Moderate mitral regurgitation    6. Mild tricuspid regurgitation    7. RLS (restless legs syndrome)    8. Osteopenia of multiple sites              Plan:      Bettina Robison was seen today for hypertension. Diagnoses and all orders for this visit:    Essential hypertension:  Stable, cont same meds    Mixed hyperlipidemia:  Stable, cont same meds    Gastroesophageal reflux disease without esophagitis:  Stable, cont same meds    Mild persistent asthma without complication:  Stable, cont same meds    Moderate mitral regurgitation:  Monitor echo 2-3 yrs    Mild tricuspid regurgitation:  As above    RLS (restless legs syndrome): Stable, cont same meds    Osteopenia of multiple sites:  Stable, cont same meds        Controlled Substance Monitoring:    Acute and Chronic Pain Monitoring:   RX Monitoring 6/21/2021   Attestation -   Periodic Controlled Substance Monitoring No signs of potential drug abuse or diversion identified. Chronic Pain > 50 MEDD Obtained or confirmed \"Consent for Opioid Use\" on file.    Chronic Pain > 80 MEDD -     3 months

## 2021-08-02 ENCOUNTER — OFFICE VISIT (OUTPATIENT)
Dept: ORTHOPEDIC SURGERY | Age: 76
End: 2021-08-02
Payer: MEDICARE

## 2021-08-02 VITALS
HEART RATE: 93 BPM | BODY MASS INDEX: 24.22 KG/M2 | SYSTOLIC BLOOD PRESSURE: 147 MMHG | HEIGHT: 62 IN | WEIGHT: 131.6 LBS | DIASTOLIC BLOOD PRESSURE: 80 MMHG

## 2021-08-02 DIAGNOSIS — M19.012 ARTHRITIS OF LEFT SHOULDER REGION: ICD-10-CM

## 2021-08-02 DIAGNOSIS — M75.82 TENDINITIS OF LEFT ROTATOR CUFF: ICD-10-CM

## 2021-08-02 DIAGNOSIS — M25.512 ACUTE PAIN OF LEFT SHOULDER: Primary | ICD-10-CM

## 2021-08-02 PROCEDURE — G8427 DOCREV CUR MEDS BY ELIG CLIN: HCPCS | Performed by: ORTHOPAEDIC SURGERY

## 2021-08-02 PROCEDURE — 1123F ACP DISCUSS/DSCN MKR DOCD: CPT | Performed by: ORTHOPAEDIC SURGERY

## 2021-08-02 PROCEDURE — 1090F PRES/ABSN URINE INCON ASSESS: CPT | Performed by: ORTHOPAEDIC SURGERY

## 2021-08-02 PROCEDURE — G8399 PT W/DXA RESULTS DOCUMENT: HCPCS | Performed by: ORTHOPAEDIC SURGERY

## 2021-08-02 PROCEDURE — 20610 DRAIN/INJ JOINT/BURSA W/O US: CPT | Performed by: ORTHOPAEDIC SURGERY

## 2021-08-02 PROCEDURE — 99203 OFFICE O/P NEW LOW 30 MIN: CPT | Performed by: ORTHOPAEDIC SURGERY

## 2021-08-02 PROCEDURE — 1036F TOBACCO NON-USER: CPT | Performed by: ORTHOPAEDIC SURGERY

## 2021-08-02 PROCEDURE — G8420 CALC BMI NORM PARAMETERS: HCPCS | Performed by: ORTHOPAEDIC SURGERY

## 2021-08-02 PROCEDURE — 4040F PNEUMOC VAC/ADMIN/RCVD: CPT | Performed by: ORTHOPAEDIC SURGERY

## 2021-08-02 RX ORDER — MOMETASONE FUROATE 1 MG/G
OINTMENT TOPICAL
COMMUNITY
Start: 2021-05-05

## 2021-08-02 RX ORDER — KETOCONAZOLE 20 MG/G
CREAM TOPICAL
COMMUNITY
Start: 2021-07-26

## 2021-08-02 RX ORDER — LIDOCAINE HYDROCHLORIDE 10 MG/ML
4 INJECTION, SOLUTION INFILTRATION; PERINEURAL ONCE
Status: COMPLETED | OUTPATIENT
Start: 2021-08-02 | End: 2021-08-02

## 2021-08-02 RX ORDER — METHYLPREDNISOLONE ACETATE 40 MG/ML
80 INJECTION, SUSPENSION INTRA-ARTICULAR; INTRALESIONAL; INTRAMUSCULAR; SOFT TISSUE ONCE
Status: COMPLETED | OUTPATIENT
Start: 2021-08-02 | End: 2021-08-02

## 2021-08-02 RX ADMIN — METHYLPREDNISOLONE ACETATE 80 MG: 40 INJECTION, SUSPENSION INTRA-ARTICULAR; INTRALESIONAL; INTRAMUSCULAR; SOFT TISSUE at 12:00

## 2021-08-02 RX ADMIN — LIDOCAINE HYDROCHLORIDE 4 ML: 10 INJECTION, SOLUTION INFILTRATION; PERINEURAL at 12:00

## 2021-08-02 ASSESSMENT — ENCOUNTER SYMPTOMS
ALLERGIC/IMMUNOLOGIC NEGATIVE: 1
SHORTNESS OF BREATH: 1
EYES NEGATIVE: 1

## 2021-08-02 NOTE — PROGRESS NOTES
Subjective:      Patient ID: Ahsan Wharton is a 68 y.o. female. HPI  Yvonne Carcamo is seen today for evaluation of chronic left shoulder pain. She is assuming she has arthritis because she has arthritis in multiple body parts. Over the last month things have gotten worse and she now has pain reaching overhead. Pain is anywhere from 5-8 out of 10. She takes tramadol for restless leg and that sometimes helps her shoulder. She also uses Aspercreme. She is right-hand dominant. Past medical history significant for GERD, hypertension, high cholesterol, mitral valve regurgitation, osteopenia, and restless legs. Review of Systems   Constitutional: Negative. HENT: Negative. Eyes: Negative. Respiratory: Positive for shortness of breath. Asthma     Musculoskeletal: Positive for arthralgias. Arthritis- hands, shoulders   Skin: Negative. Allergic/Immunologic: Negative. Neurological: Negative. Hematological: Negative. Psychiatric/Behavioral: Negative. Objective:   Physical Exam  History: Patient's relevant past family, medical, and social history are reviewed as part of today's visit. ROS of pertinent positives and negatives as above; otherwise negative. General Exam:    Vitals: Blood pressure (!) 147/80, pulse 93, height 5' 2\" (1.575 m), weight 131 lb 9.6 oz (59.7 kg), not currently breastfeeding. Constitutional: Patient is adequately groomed with no evidence of malnutrition  Mental Status: The patient is oriented to time, place and person. The patient's mood and affect are appropriate. Gait:  Patient walks with normal gait and station. Lymphatic: The lymphatic examination bilaterally reveals all areas to be without enlargement or induration. Vascular: Examination reveals no swelling or calf tenderness. Peripheral pulses are palpable and 2+. Neurological: The patient has good coordination. There is no weakness or sensory deficit.     Skin:    Head/Neck: inspection reveals no rashes, ulcerations or lesions. Trunk:  inspection reveals no rashes, ulcerations or lesions. Right Lower Extremity: inspection reveals no rashes, ulcerations or lesions. Left Lower Extremity: inspection reveals no rashes, ulcerations or lesions. Examination of the cervical spine reveals no restriction in motion. There are no reproduction of symptoms into either arm with flexion, extension, rotation or palpation. The patient has a negative Spurling sign, and no tenderness. Examination of the right shoulder reveals normal scapular control and no prominence. There is no pain over the acromioclavicular or sternoclavicular joints. The patient has no biceps pain. There is full range of motion. There is no pain with impingement testing. There is no pain with Pickett maneuver. Sweetwater's maneuver is normal.  There is no pain or apprehension in the abducted externally rotated position. There is no sulcus sign. There is no instability with anterior or posterior stress applied. The patient demonstrates full strength in the supraspinatus, infraspinatus, and subscapularis. Neurologic and vascular examination of the upper extremity  is normal.    Left shoulder has full passive elevation but she has discomfort bring the arm back to the side. She has tightness in internal rotation. She has appropriate strength throughout the cuff with a normal neurovascular exam.    Xrays of the left shoulder were obtained today in the office and interpreted by me. AP in the scapular plane, axillary lateral, and scapular Y. These demonstrate: Moderate glenohumeral arthritis and scuffing of the greater tuberosity consistent with chronic cuff tendinitis      Assessment:      Left shoulder arthritis and rotator cuff tendinitis      Plan: At this point she would not want to consider surgery. Reported start with a cortisone injection followed by therapy. She agrees with this plan.     Procedure: Under sterile technique, left shoulder is injected posteriorly with 80 mg of Depo-Medrol and 40 mg of lidocaine. She tolerated that well. She will start therapy next week and follow-up with me in a month. This note was created using voice recognition software. It has been proofread, but occasionally errors remain. Please disregard these errors. They will be corrected as they are noted.

## 2021-08-06 ENCOUNTER — PATIENT MESSAGE (OUTPATIENT)
Dept: ORTHOPEDIC SURGERY | Age: 76
End: 2021-08-06

## 2021-08-06 NOTE — TELEPHONE ENCOUNTER
From: Yvonne Carcamo  To: Brianna Irby MD  Sent: 8/6/2021 12:43 PM EDT  Subject: Visit Follow-Up Question    Sorry about the miss- send. I am having a lot of pain. I did some light gardening on Tuesday and Wednesday, but nothing strenuous or anything that caused pain at the time. I first noticed the increase when I reached back to grab the seatbelt buckle when I was driving home from the store. Since then I have tried ice, OTC nsaids, OTC lidocaine patches, etc.  I also have slight numbing in my hand occasionally.

## 2021-08-09 ENCOUNTER — HOSPITAL ENCOUNTER (OUTPATIENT)
Dept: PHYSICAL THERAPY | Age: 76
Setting detail: THERAPIES SERIES
Discharge: HOME | End: 2021-08-09
Payer: MEDICARE

## 2021-08-09 ENCOUNTER — OFFICE VISIT (OUTPATIENT)
Dept: ORTHOPEDIC SURGERY | Age: 76
End: 2021-08-09
Payer: MEDICARE

## 2021-08-09 VITALS
WEIGHT: 131 LBS | HEIGHT: 62 IN | SYSTOLIC BLOOD PRESSURE: 186 MMHG | DIASTOLIC BLOOD PRESSURE: 95 MMHG | BODY MASS INDEX: 24.11 KG/M2 | RESPIRATION RATE: 12 BRPM | HEART RATE: 102 BPM

## 2021-08-09 DIAGNOSIS — S46.112A RUPTURE OF LEFT LONG HEAD BICEPS TENDON, INITIAL ENCOUNTER: ICD-10-CM

## 2021-08-09 DIAGNOSIS — M19.012 ARTHRITIS OF LEFT SHOULDER REGION: ICD-10-CM

## 2021-08-09 DIAGNOSIS — M25.512 ACUTE PAIN OF LEFT SHOULDER: Primary | ICD-10-CM

## 2021-08-09 DIAGNOSIS — M75.82 TENDINITIS OF LEFT ROTATOR CUFF: ICD-10-CM

## 2021-08-09 PROCEDURE — G8420 CALC BMI NORM PARAMETERS: HCPCS | Performed by: ORTHOPAEDIC SURGERY

## 2021-08-09 PROCEDURE — 97112 NEUROMUSCULAR REEDUCATION: CPT | Performed by: PHYSICAL THERAPIST

## 2021-08-09 PROCEDURE — 1036F TOBACCO NON-USER: CPT | Performed by: ORTHOPAEDIC SURGERY

## 2021-08-09 PROCEDURE — 97110 THERAPEUTIC EXERCISES: CPT | Performed by: PHYSICAL THERAPIST

## 2021-08-09 PROCEDURE — 97161 PT EVAL LOW COMPLEX 20 MIN: CPT | Performed by: PHYSICAL THERAPIST

## 2021-08-09 PROCEDURE — 1123F ACP DISCUSS/DSCN MKR DOCD: CPT | Performed by: ORTHOPAEDIC SURGERY

## 2021-08-09 PROCEDURE — G8427 DOCREV CUR MEDS BY ELIG CLIN: HCPCS | Performed by: ORTHOPAEDIC SURGERY

## 2021-08-09 PROCEDURE — 99213 OFFICE O/P EST LOW 20 MIN: CPT | Performed by: ORTHOPAEDIC SURGERY

## 2021-08-09 PROCEDURE — G8399 PT W/DXA RESULTS DOCUMENT: HCPCS | Performed by: ORTHOPAEDIC SURGERY

## 2021-08-09 PROCEDURE — 1090F PRES/ABSN URINE INCON ASSESS: CPT | Performed by: ORTHOPAEDIC SURGERY

## 2021-08-09 PROCEDURE — 4040F PNEUMOC VAC/ADMIN/RCVD: CPT | Performed by: ORTHOPAEDIC SURGERY

## 2021-08-09 NOTE — PROGRESS NOTES
INJECTIONS:    Lido:  [id number]  Lot # [id number]  Exp 12/23    Site: left shoulder   Unit 4ml      Depo:  Ndc: [id number]  Lot # [id number]  Exp: 6/22    Site: left shoulder  Unit: 2 ml

## 2021-08-09 NOTE — PROGRESS NOTES
Sandy Bajwa returns today after feeling a pop in her left shoulder. Last week she reached into the back seat and felt a \"twang\". In her left shoulder she has had some swelling and she was very concerned about that. She no longer has shoulder pain at all but some discomfort into the elbow and forearm. Patient's medications, allergies, past medical, surgical, social and family histories were reviewed and updated as appropriate. Relevant review of systems reviewed. General Exam:    Vitals: Blood pressure (!) 186/95, pulse 102, resp. rate 12, height 5' 2\" (1.575 m), weight 131 lb (59.4 kg), not currently breastfeeding. Constitutional: Patient is adequately groomed with no evidence of malnutrition  Mental Status: The patient is oriented to time, place and person. The patient's mood and affect are appropriate. Today, left shoulder has clear rupture of the long head bicep. She really has minimal if any tenderness but does complain of discomfort. She has good strength throughout the cuff and normal glenohumeral motion. Assessment: Long head bicep rupture left shoulder. Plan: I tried to reassure her that her pain distal to the shoulder should resolve. She takes chronic tramadol and that can be a bit problematic. She will use some ibuprofen and continue with her therapy and follow-up with me in 3 weeks. This note was created using voice recognition software. It has been proofread, but occasionally errors remain. Please disregard these errors. They will be corrected as they are noted.

## 2021-08-09 NOTE — PLAN OF CARE
[address], [address] [place], [address]  Phone: [phone]   Fax: [phone]      Physical Therapy Certification    Dear Referring Practitioner: Dr. Avani Flanagan,    We had the pleasure of evaluating the following patient for physical therapy services at [address] [place] [address]. A summary of our findings can be found in the initial assessment below. This includes our plan of care. If you have any questions or concerns regarding these findings, please do not hesitate to contact me at the office phone number checked above. Thank you for the referral.       Physician Signature:_______________________________Date:__________________  By signing above (or electronic signature), therapists plan is approved by physician      Patient: Guillermo Adame   : 1945   MRN: [id number]  Referring Physician: Referring Practitioner: Dr. Avani Flanagan      Evaluation Date: 2021      Medical Diagnosis Information:  Diagnosis: Arthritis of left shoulder region - M19.012   Treatment Diagnosis: decreased ROM, strength, and high-level IADLs                                         Insurance information: PT Insurance Information: [employer]    C-SSRS Triggered by Intake questionnaire (Past 2 wk assessment):   [x] No, Questionnaire did not trigger screening.   [] Yes, Patient intake triggered further evaluation      [] C-SSRS Screening completed  [] PCP notified via Plan of Care  [] Emergency services notified     Precautions/ Contra-indications: none  Latex Allergy:  [x]NO      []YES  Preferred Language for Healthcare:   [x]English       []other:    SUBJECTIVE: Patient stated complaint: she states the original pain started several years. She states the last 6 months the pain has gotten worse.  She went to see her PCP, who referred her to an ortho MD. She states she received a cortisone shot last Monday (), but on  she was reaching behind her back and heard a pop. She states she had increased pain since then. She states she just saw the MD this morning, who told her she tore the biceps. CLOF: She states ice helps and medication improves pain. She states if she jars her shoulder, she will have increased pain. She states she is trying not to lift with the L shoulder. She states gripping was difficult, as well. She states going to sleep has been difficult, but since the cortisone shot she is able to sleep on the left side. She states when she has pain, its sharp and quick. Relevant Medical History: high blood pressure (on medications), OA  Functional Disability Index:  UEFI  8/9 - 34% limitation     Pain Scale: 1-2/10 today, 8-9/10 at worst, hx of neck pain   Easing factors: ice, medication  Provocative factors: listed above     Type: [x]Constant   []Intermittent  []Radiating []Localized []other:     Numbness/Tingling: tingling down the LUE in hang in general    Occupation/School:  Retired    Hobbies: knit    Living Status/Prior Level of Function: Independent with ADLs and IADLs,     OBJECTIVE:     ROM AROM    L R   Flexion 131 + pain  169   Abduction 126 + min pain  176   ER 84 + pain in biceps 98   IR 56 + pain  55   Other(cervical)              Strength L R Comment   Flexion 3+/5 + pain  4/5    Abduction 4-/5 + pain  4/5    ER 4-/5 4/5    IR 4/5 4/5          Lower Trap      Mid Trap      Rhomboids      Biceps 4/5 4/5    Triceps 4/5 4/5        Reflexes/Sensation:    [x]Dermatomes/Myotomes intact    [x]Reflexes equal and normal bilaterally   []Other:    Joint mobility:    []Normal    [x]Hypo   []Hyper    Palpation: TTP - biceps muscle belly, along lateral border of scapula,     Functional Mobility/Transfers: Independent     Posture: Rounded shoulders     Gait: (include devices/WB status): WNL                       [x] Patient history, allergies, meds reviewed. Medical chart reviewed. See intake form.      Review Of Systems (ROS):  [x]Performed Review of systems (Integumentary, CardioPulmonary, Neurological) by intake and observation. Intake form has been scanned into medical record. Patient has been instructed to contact their primary care physician regarding ROS issues if not already being addressed at this time. Co-morbidities/Complexities (which will affect course of rehabilitation):   []None           Arthritic conditions   []Rheumatoid arthritis (M05.9)  [x]Osteoarthritis (M19.91)   Cardiovascular conditions   [x]Hypertension (I10)  []Hyperlipidemia (E78.5)  []Angina pectoris (I20)  []Atherosclerosis (I70)   Musculoskeletal conditions   []Disc pathology   []Congenital spine pathologies   []Prior surgical intervention  []Osteoporosis (M81.8)  []Osteopenia (M85.8)   Endocrine conditions   []Hypothyroid (E03.9)  []Hyperthyroid Gastrointestinal conditions   []Constipation (J38.39)   Metabolic conditions   []Morbid obesity (E66.01)  []Diabetes type 1(E10.65) or 2 (E11.65)   []Neuropathy (G60.9)     Pulmonary conditions   []Asthma (J45)  []Coughing   []COPD (J44.9)   Psychological Disorders  []Anxiety (F41.9)  []Depression (F32.9)   []Other:   [x]Other: hx of neck pain          Barriers to/and or personal factors that will affect rehab potential:              []Age  []Sex              []Motivation/Lack of Motivation                        [x]Co-Morbidities              []Cognitive Function, education/learning barriers              []Environmental, home barriers              []profession/work barriers  []past PT/medical experience  []other:  Justification:  Pt's co-morbidities and history of neck pain may affect rehab potential.     Falls Risk Assessment (30 days):   [x] Falls Risk assessed and no intervention required.   [] Falls Risk assessed and Patient requires intervention due to being higher risk   TUG score (>12s at risk):     [] Falls education provided, including       ASSESSMENT:   Functional Impairments   [x]Noted spinal or UE joint from Dry needling     []other:     Prognosis/Rehab Potential:      [x]Excellent   [x]Good    []Fair   []Poor    Tolerance of evaluation/treatment:    [x]Excellent   [x]Good    []Fair   []Poor    Physical Therapy Evaluation Complexity Justification  [x] A history of present problem with:  [] no personal factors and/or comorbidities that impact the plan of care;  [x]1-2 personal factors and/or comorbidities that impact the plan of care  []3 personal factors and/or comorbidities that impact the plan of care  [x] An examination of body systems using standardized tests and measures addressing any of the following: body structures and functions (impairments), activity limitations, and/or participation restrictions;:  [] a total of 1-2 or more elements   [] a total of 3 or more elements   [x] a total of 4 or more elements   [x] A clinical presentation with:  [x] stable and/or uncomplicated characteristics   [] evolving clinical presentation with changing characteristics  [] unstable and unpredictable characteristics;   [x] Clinical decision making of [x] low, [] moderate, [] high complexity using standardized patient assessment instrument and/or measurable assessment of functional outcome. [x] EVAL (LOW) 94412 (typically 20 minutes face-to-face)  [] EVAL (MOD) 13183 (typically 30 minutes face-to-face)  [] EVAL (HIGH) 07810 (typically 45 minutes face-to-face)  [] RE-EVAL     Reviewed insurance benefits for physical therapy in an outpatient hospital based setting with the patient, including deductible and allowable visit number. PLAN:  Frequency/Duration:  1-2 days per week for 4-6 Weeks:  INTERVENTIONS:  [x] Therapeutic exercise including: strength training, ROM, for Upper extremity and core   [x]  NMR activation and proprioception for UE, scap and Core   [x] Manual therapy as indicated for shoulder, scapula and spine to include: Dry Needling/IASTM, STM, PROM, Gr I-IV mobilizations, manipulation.    [x] Modalities as needed that may include: thermal agents, E-stim, Biofeedback, US, iontophoresis as indicated  [x] Patient education on joint protection, postural re-education, activity modification, progression of HEP. HEP instruction: (see scanned forms)    GOALS:  Patient stated goal: learn to control the pain  [] Progressing: [] Met: [] Not Met: [] Adjusted    Therapist goals for Patient:   Short Term Goals: To be achieved in: 2 weeks  1. Independent in HEP and progression per patient tolerance, in order to prevent re-injury. [] Progressing: [] Met: [] Not Met: [] Adjusted   2. Patient will have a decrease in pain to facilitate improvement in movement, function, and ADLs as indicated by Functional Deficits. [] Progressing: [] Met: [] Not Met: [] Adjusted    Long Term Goals: To be achieved in: 4-6 weeks  1. Disability index score of 34% or less for the UEFI to assist with reaching prior level of function. [] Progressing: [] Met: [] Not Met: [] Adjusted  2. Patient will demonstrate increased left shoulder flex AROM to greater than or equal to 165 deg, ABD AROM to greater than or equal to 175 deg, ER AROM to greater than or equal to 95 deg to allow for proper joint functioning as indicated by patients Functional Deficits. [] Progressing: [] Met: [] Not Met: [] Adjusted  3. Patient will demonstrate an increase in left shoulder (flex, ABD, IR, and ER) strength to 4/5 to allow for proper functional mobility as indicated by patients Functional Deficits. [] Progressing: [] Met: [] Not Met: [] Adjusted  4. Patient will return to ADLs without increased symptoms or restriction. [] Progressing: [] Met: [] Not Met: [] Adjusted  5. Patient will report knitting pain free.    [] Progressing: [] Met: [] Not Met: [] Adjusted     Electronically signed by:  Claire Fernandes PT, DPT

## 2021-08-09 NOTE — FLOWSHEET NOTE
[address] [facility],[address] [facility], [address] [facility]  Phone: [phone]   Fax: [phone]    Physical Therapy Treatment Note/ Progress Report:       Date:  2021    Patient Name:  Kathy Schlatter    :  1945  MRN: [id number]  Restrictions/Precautions:    Medical/Treatment Diagnosis Information:  Diagnosis: Arthritis of left shoulder region - M19.012  Treatment Diagnosis: decreased ROM, strength, and high-level IADLs  Insurance/Certification information:  PT Insurance Information: [employer]  Physician Information:  Referring Practitioner: Dr. Krys King  Has the plan of care been signed (Y/N):        []  Yes  [x]  No     Date of Patient follow up with Physician: 2021      Is this a Progress Report:     []  Yes  [x]  No        Progress report/ Recertification will be due (10 Rx or 30 days whichever is less): 2021       Visit # Insurance Allowable Auth Required   1 BMN []  Yes []  No        Functional Scale:   UEFI  8/9 - 34% limitation          Latex Allergy:  [x]NO      []YES  Preferred Language for Healthcare:   [x]English       []other:      Pain level:  1-2/10 8/9     SUBJECTIVE:  See eval     OBJECTIVE: See russell    Observation:    Test measurements:      RESTRICTIONS/PRECAUTIONS: none    Exercises/Interventions:   Exercise/Equipment Resistance/Repetitions Other comments   Stretching/PROM     Wand     Table Slides     UE [place]     Pulleys     Wall walks 10 sec x 10 flex and scap Added 8/9        Isometrics     Retraction 10 sec x 10  Added 8/9        Weight shift     Flexion 10 sec x 10  Added 8/9   Abduction 10 sec x 10  Added 8/9   External Rotation 10 sec x 10  Added 8/9   Internal Rotation 10 sec x 10  Added 8/9   Biceps     Triceps          PRE's     Flexion     Abduction     External Rotation     Internal Rotation     Shrugs     EXT     Reverse Flys     Serratus     Horizontal Abd with ER     Biceps     Triceps Retraction          Cable Column/Theraband     External Rotation     Internal Rotation     Shrugs     Lats     Ext     Flex     Scapular Retraction     BIC     TRIC     PNF          Dynamic Stability          P[place]oback          Manual interventions                   Therapeutic Exercise and NMR EXR  [x] (77405) Provided verbal/tactile cueing for activities related to strengthening, flexibility, endurance, ROM  for improvements in scapular, scapulothoracic and UE control with self care, reaching, carrying, lifting, house/yardwork, driving/computer work. [x] (51499) Provided verbal/tactile cueing for activities related to improving balance, coordination, kinesthetic sense, posture, motor skill, proprioception  to assist with  scapular, scapulothoracic and UE control with self care, reaching, carrying, lifting, house/yardwork, driving/computer work. Therapeutic Activities:    [x] (13514 or 54457) Provided verbal/tactile cueing for activities related to improving balance, coordination, kinesthetic sense, posture, motor skill, proprioception and motor activation to allow for proper function of scapular, scapulothoracic and UE control with self care, carrying, lifting, driving/computer work.      Home Exercise Program:    [x] (88490) Reviewed/Progressed HEP activities related to strengthening, flexibility, endurance, ROM of scapular, scapulothoracic and UE control with self care, reaching, carrying, lifting, house/yardwork, driving/computer work  [] (74362) Reviewed/Progressed HEP activities related to improving balance, coordination, kinesthetic sense, posture, motor skill, proprioception of scapular, scapulothoracic and UE control with self care, reaching, carrying, lifting, house/yardwork, driving/computer work      Manual Treatments:  PROM / STM / Oscillations-Mobs:  G-I, II, III, IV (PA's, Inf., Post.)  [] (72397) Provided manual therapy to mobilize soft tissue/joints of cervical/CT, scapular GHJ and UE for the purpose of modulating pain, promoting relaxation,  increasing ROM, reducing/eliminating soft tissue swelling/inflammation/restriction, improving soft tissue extensibility and allowing for proper ROM for normal function with self care, reaching, carrying, lifting, house/yardwork, driving/computer work    Modalities:     [] GAME READY (VASO)- for significant edema, swelling, pain control. Charges:  Timed Code Treatment Minutes: 30'   Total Treatment Minutes: 62'       [x] EVAL (LOW) 68470 (typically 20 minutes face-to-face)  [] EVAL (MOD) 47335 (typically 30 minutes face-to-face)  [] EVAL (HIGH) 63979 (typically 45 minutes face-to-face)  [] RE-EVAL     [x] ZP(85873) x 1    [] IONTO  [x] NMR (12122) x 1    [] VASO  [] Manual (06259) x     [] Other:  [] TA x      [] Mech Traction (01614)  [] ES(attended) (56530)      [] ES (un) (52402):         ASSESSMENT:  See eval 8/9      GOALS:  Patient stated goal: learn to control the pain  [] Progressing: [] Met: [] Not Met: [] Adjusted    Therapist goals for Patient:   Short Term Goals: To be achieved in: 2 weeks  1. Independent in HEP and progression per patient tolerance, in order to prevent re-injury. [] Progressing: [] Met: [] Not Met: [] Adjusted   2. Patient will have a decrease in pain to facilitate improvement in movement, function, and ADLs as indicated by Functional Deficits. [] Progressing: [] Met: [] Not Met: [] Adjusted    Long Term Goals: To be achieved in: 4-6 weeks  1. Disability index score of 34% or less for the UEFI to assist with reaching prior level of function. [] Progressing: [] Met: [] Not Met: [] Adjusted  2. Patient will demonstrate increased left shoulder flex AROM to greater than or equal to 165 deg, ABD AROM to greater than or equal to 175 deg, ER AROM to greater than or equal to 95 deg to allow for proper joint functioning as indicated by patients Functional Deficits. [] Progressing: [] Met: [] Not Met: [] Adjusted  3.  Patient will demonstrate an increase in left shoulder (flex, ABD, IR, and ER) strength to 4/5 to allow for proper functional mobility as indicated by patients Functional Deficits. [] Progressing: [] Met: [] Not Met: [] Adjusted  4. Patient will return to ADLs without increased symptoms or restriction. [] Progressing: [] Met: [] Not Met: [] Adjusted  5. Patient will report knitting pain free. [] Progressing: [] Met: [] Not Met: [] Adjusted       Overall Progression Towards Functional goals/ Treatment Progress Update:  [] Patient is progressing as expected towards functional goals listed. [] Progression is slowed due to complexities/Impairments listed. [] Progression has been slowed due to co-morbidities. [x] Plan just implemented, too soon to assess goals progression <30days   [] Goals require adjustment due to lack of progress  [] Patient is not progressing as expected and requires additional follow up with physician  [] Other    Prognosis for POC: [x] Good [] Fair  [] Poor      Patient requires continued skilled intervention: [x] Yes  [] No      Treatment/Activity Tolerance:  [x] Patient tolerated treatment well [] Patient limited by fatique  [] Patient limited by pain  [] Patient limited by other medical complications  [] Other:     Patient education: Patient education on PT and plan of care including diagnosis, prognosis, treatment goals and options. Patient agrees with discussed POC and treatment and is aware of rehab process. 8/9      PLAN: See eval 8/9  [] Continue per plan of care [] Alter current plan (see comments above)  [x] Plan of care initiated [] Hold pending MD visit [] Discharge      Electronically signed by:  Hong Hester PT, DPT     Note: If patient does not return for scheduled/ recommended follow up visits, this note will serve as a discharge from care along with most recent update on progress.

## 2021-08-11 ENCOUNTER — HOSPITAL ENCOUNTER (OUTPATIENT)
Dept: PHYSICAL THERAPY | Age: 76
Setting detail: THERAPIES SERIES
Discharge: HOME | End: 2021-08-11
Payer: MEDICARE

## 2021-08-11 ENCOUNTER — PATIENT MESSAGE (OUTPATIENT)
Dept: ORTHOPEDIC SURGERY | Age: 76
End: 2021-08-11

## 2021-08-11 PROCEDURE — 97110 THERAPEUTIC EXERCISES: CPT | Performed by: PHYSICAL THERAPIST

## 2021-08-11 PROCEDURE — 97112 NEUROMUSCULAR REEDUCATION: CPT | Performed by: PHYSICAL THERAPIST

## 2021-08-11 NOTE — FLOWSHEET NOTE
[facility], [address] [place], [address]  Phone: [phone]   Fax: [phone]    Physical Therapy Treatment Note/ Progress Report:       Date:  2021    Patient Name:  Conchita Wright    :  1945  MRN: [id number]  Restrictions/Precautions:    Medical/Treatment Diagnosis Information:  Diagnosis: Arthritis of left shoulder region - M19.012  Treatment Diagnosis: decreased ROM, strength, and high-level IADLs  Insurance/Certification information:  PT Insurance Information: [employer]  Physician Information:  Referring Practitioner: Dr. Komal Ramírez  Has the plan of care been signed (Y/N):        []  Yes  [x]  No     Date of Patient follow up with Physician: 2021      Is this a Progress Report:     []  Yes  [x]  No        Progress report/ Recertification will be due (10 Rx or 30 days whichever is less): 2021       Visit # Insurance Allowable Auth Required   2 BMN []  Yes []  No        Functional Scale:   UEFI  8/9 - 34% limitation          Latex Allergy:  [x]NO      []YES  Preferred Language for Healthcare:   [x]English       []other:      Pain level:  2/10 8/11     SUBJECTIVE:  States she took pain medication after the initial appointment, but didn't feel like she had to take it yesterday. She states she felt like she was moving her shoulder more yesterday. She did have a little back pain while doing the shoulder isometrics, but last night she had no problems.       OBJECTIVE: See eval    Observation:    Test measurements:      RESTRICTIONS/PRECAUTIONS: none    Exercises/Interventions:   Exercise/Equipment Resistance/Repetitions Other comments   Stretching/PROM     Wand     Table Slides     UE Rock     Rhomboid stretch 10 sec x 10  Added /11   Wall walks 10 sec x 10 flex and scap Added 8/9        Isometrics     Retraction 10 sec x 10  Added 8/        Weight shift     Flexion 10 sec x 10  Added 8/9   Abduction 10 sec x 10  Added 8/9   External Rotation 10 sec x 10  Added 8/9   Internal Rotation 10 sec x 10  Added 8/9   Biceps     Triceps          PRE's     Flexion     Abduction     External Rotation     Internal Rotation     Shrugs     EXT     Reverse Flys     Serratus 10 x 3  Added 8/11   Horizontal Abd with ER     Biceps 10 x 3  Added 8/11   Triceps     Retraction          Cable Column/Theraband     External Rotation     Internal Rotation     Shrugs     Lats     Ext     Flex     Scapular Retraction     BIC     TRIC     PNF          Dynamic Stability          Plyoback          Manual interventions                   Therapeutic Exercise and NMR EXR  [x] (10036) Provided verbal/tactile cueing for activities related to strengthening, flexibility, endurance, ROM  for improvements in scapular, scapulothoracic and UE control with self care, reaching, carrying, lifting, house/yardwork, driving/computer work. [x] (28456) Provided verbal/tactile cueing for activities related to improving balance, coordination, kinesthetic sense, posture, motor skill, proprioception  to assist with  scapular, scapulothoracic and UE control with self care, reaching, carrying, lifting, house/yardwork, driving/computer work. Therapeutic Activities:    [x] (22762 or 11019) Provided verbal/tactile cueing for activities related to improving balance, coordination, kinesthetic sense, posture, motor skill, proprioception and motor activation to allow for proper function of scapular, scapulothoracic and UE control with self care, carrying, lifting, driving/computer work.      Home Exercise Program:    [x] (44901) Reviewed/Progressed HEP activities related to strengthening, flexibility, endurance, ROM of scapular, scapulothoracic and UE control with self care, reaching, carrying, lifting, house/yardwork, driving/computer work  [] (22156) Reviewed/Progressed HEP activities related to improving balance, coordination, kinesthetic sense, posture, motor skill, proprioception of scapular, scapulothoracic and UE control with self care, reaching, carrying, lifting, house/yardwork, driving/computer work      Manual Treatments:  PROM / STM / Oscillations-Mobs:  G-I, II, III, IV (PA's, Inf., Post.)  [] (38974) Provided manual therapy to mobilize soft tissue/joints of cervical/CT, scapular GHJ and UE for the purpose of modulating pain, promoting relaxation,  increasing ROM, reducing/eliminating soft tissue swelling/inflammation/restriction, improving soft tissue extensibility and allowing for proper ROM for normal function with self care, reaching, carrying, lifting, house/yardwork, driving/computer work    Modalities:     [] GAME READY (VASO)- for significant edema, swelling, pain control. Charges:  Timed Code Treatment Minutes: 30'    Total Treatment Minutes: 28'       [] EVAL (LOW) 98531 (typically 20 minutes face-to-face)  [] EVAL (MOD) 88386 (typically 30 minutes face-to-face)  [] EVAL (HIGH) 39589 (typically 45 minutes face-to-face)  [] RE-EVAL     [x] IG(93038) x 1    [] IONTO  [x] NMR (33549) x 1    [] VASO  [] Manual (44017) x     [] Other:  [] TA x      [] Mech Traction (67425)  [] ES(attended) (81640)      [] ES (un) (02249):         ASSESSMENT:        GOALS:  Patient stated goal: learn to control the pain  [] Progressing: [] Met: [] Not Met: [] Adjusted    Therapist goals for Patient:   Short Term Goals: To be achieved in: 2 weeks  1. Independent in HEP and progression per patient tolerance, in order to prevent re-injury. [] Progressing: [] Met: [] Not Met: [] Adjusted   2. Patient will have a decrease in pain to facilitate improvement in movement, function, and ADLs as indicated by Functional Deficits. [] Progressing: [] Met: [] Not Met: [] Adjusted    Long Term Goals: To be achieved in: 4-6 weeks  1. Disability index score of 34% or less for the UEFI to assist with reaching prior level of function. [] Progressing: [] Met: [] Not Met: [] Adjusted  2. Patient will demonstrate increased left shoulder flex AROM to greater than or equal to 165 deg, ABD AROM to greater than or equal to 175 deg, ER AROM to greater than or equal to 95 deg to allow for proper joint functioning as indicated by patients Functional Deficits. [] Progressing: [] Met: [] Not Met: [] Adjusted  3. Patient will demonstrate an increase in left shoulder (flex, ABD, IR, and ER) strength to 4/5 to allow for proper functional mobility as indicated by patients Functional Deficits. [] Progressing: [] Met: [] Not Met: [] Adjusted  4. Patient will return to ADLs without increased symptoms or restriction. [] Progressing: [] Met: [] Not Met: [] Adjusted  5. Patient will report knitting pain free. [] Progressing: [] Met: [] Not Met: [] Adjusted       Overall Progression Towards Functional goals/ Treatment Progress Update:  [] Patient is progressing as expected towards functional goals listed. [] Progression is slowed due to complexities/Impairments listed. [] Progression has been slowed due to co-morbidities. [x] Plan just implemented, too soon to assess goals progression <30days   [] Goals require adjustment due to lack of progress  [] Patient is not progressing as expected and requires additional follow up with physician  [] Other    Prognosis for POC: [x] Good [] Fair  [] Poor      Patient requires continued skilled intervention: [x] Yes  [] No      Treatment/Activity Tolerance:  [x] Patient tolerated treatment well [] Patient limited by fatique  [] Patient limited by pain  [] Patient limited by other medical complications  [] Other: Pt xiomara session well. She is no longer having pain, but continues to fatigue with shoulder isometrics. Scapular stretch was added due to tightness after completing shoulder isometrics. She xiomara the addition of biceps curls and SA punches pain free.  8/11    Patient education: Patient education on PT and plan of care including diagnosis, prognosis, treatment goals and options. Patient agrees with discussed POC and treatment and is aware of rehab process. 8/9      PLAN: See eval 8/9  [] Continue per plan of care [] Alter current plan (see comments above)  [x] Plan of care initiated [] Hold pending MD visit [] Discharge      Electronically signed by:  Tram Gunn, PT, DPT     Note: If patient does not return for scheduled/ recommended follow up visits, this note will serve as a discharge from care along with most recent update on progress.

## 2021-08-12 ENCOUNTER — TELEPHONE (OUTPATIENT)
Dept: ORTHOPEDIC SURGERY | Age: 76
End: 2021-08-12

## 2021-08-12 ENCOUNTER — PATIENT MESSAGE (OUTPATIENT)
Dept: ORTHOPEDIC SURGERY | Age: 76
End: 2021-08-12

## 2021-08-12 DIAGNOSIS — S46.112A RUPTURE OF LEFT LONG HEAD BICEPS TENDON, INITIAL ENCOUNTER: Primary | ICD-10-CM

## 2021-08-12 NOTE — TELEPHONE ENCOUNTER
Spoke with the patient regarding her bruising. This is common after rupture of long head bicep. She was reassured by that. She will continue with her therapy and follow-up with me as scheduled.

## 2021-08-12 NOTE — TELEPHONE ENCOUNTER
From: Yvonne Carcamo  To: Francesca Merida MD  Sent: 8/11/2021 4:28 PM EDT  Subject: Visit Follow-Up Question    I had a second PT session at 8:45 this morning. I noticed that the upper arm was swollen when I applied ice after I got home. After lunch I took a nap and my upper arm looked like this when I got up.  Is this normal?

## 2021-08-13 NOTE — TELEPHONE ENCOUNTER
Dr. Krys King called and spoke with patient. Offered appointment today, tomorrow, and next week. Patient declined at this time. She will contact us with further issues.

## 2021-08-13 NOTE — TELEPHONE ENCOUNTER
From: Yvonne Carcamo  To: Yuliana Rodriguez MD  Sent: 8/12/2021 7:49 PM EDT  Subject: Visit Follow-Up Question    I am sorry to be such a bother about this, but, the pooling of blood has not stopped. I try to keep the arm elevated, but it is difficult because of my shoulder. I expected the bruising to spread, but not to continue to pool like it has. Is there anything else I can do besides using ice and elevating the arm? The photo was taken yesterday. Today the pooling it is all the way to the bend in my arm.

## 2021-08-17 ENCOUNTER — HOSPITAL ENCOUNTER (OUTPATIENT)
Dept: PHYSICAL THERAPY | Age: 76
Setting detail: THERAPIES SERIES
Discharge: HOME | End: 2021-08-17
Payer: MEDICARE

## 2021-08-17 PROCEDURE — 97530 THERAPEUTIC ACTIVITIES: CPT | Performed by: PHYSICAL THERAPIST

## 2021-08-17 PROCEDURE — 97110 THERAPEUTIC EXERCISES: CPT | Performed by: PHYSICAL THERAPIST

## 2021-08-17 NOTE — FLOWSHEET NOTE
[address] [facility],[address], [address] [facility], [address]  Phone: [phone]   Fax: [phone]    Physical Therapy Treatment Note/ Progress Report:       Date:  2021    Patient Name:  Juliette Ayers    :  1945  MRN: [id number]  Restrictions/Precautions:    Medical/Treatment Diagnosis Information:  Diagnosis: Arthritis of left shoulder region - M19.012  Treatment Diagnosis: decreased ROM, strength, and high-level IADLs  Insurance/Certification information:  PT Insurance Information: [employer]  Physician Information:  Referring Practitioner: Dr. Britni Fischer  Has the plan of care been signed (Y/N):        []  Yes  [x]  No     Date of Patient follow up with Physician: 2021      Is this a Progress Report:     []  Yes  [x]  No        Progress report/ Recertification will be due (10 Rx or 30 days whichever is less): 2021       Visit # Insurance Allowable Auth Required   3 BMN []  Yes []  No        Functional Scale:   UEFI  8/9 - 34% limitation          Latex Allergy:  [x]NO      []YES  Preferred Language for Healthcare:   [x]English       []other:      Pain level:  0 /10     SUBJECTIVE:  States she started to bruise after last session. She did talk to MD, who told her it was normal. She states the biceps has been a little sore, but no pain in the shoulder. She states she has been able to do everything around the house. She states HEP has been fine.           OBJECTIVE: See russell    Observation:    Test measurements:      RESTRICTIONS/PRECAUTIONS: none    Exercises/Interventions:   Exercise/Equipment Resistance/Repetitions Other comments   Stretching/PROM     Wand     Biceps flex PROM 10 sec x 3 Added    Biceps stretch 10 sec x 3 Added    Rhomboid stretch Wall walks 10 sec x 10 flex and scap Added         Isometrics     Retraction 10 sec x 10  Added         Weight shift     Flexion Abduction External Rotation Internal Rotation Biceps     Triceps          PRE's     Flexion 10 x 3  Added 8/17   Abduction     External Rotation     Internal Rotation     Shrugs     EXT     Reverse Flys     Serratus 10 x 3  Added 8/11   Horizontal Abd with ER     Biceps 10 x 3  Added 8/11   Triceps     Retraction          Cable Column/Theraband     External Rotation     Internal Rotation     Shrugs     Lats     Ext     Flex     Scapular Retraction     BIC     TRIC     PNF          Dynamic Stability          Plyoback          Manual interventions                   Therapeutic Exercise and NMR EXR  [x] (34605) Provided verbal/tactile cueing for activities related to strengthening, flexibility, endurance, ROM  for improvements in scapular, scapulothoracic and UE control with self care, reaching, carrying, lifting, house/yardwork, driving/computer work. [x] (04337) Provided verbal/tactile cueing for activities related to improving balance, coordination, kinesthetic sense, posture, motor skill, proprioception  to assist with  scapular, scapulothoracic and UE control with self care, reaching, carrying, lifting, house/yardwork, driving/computer work. Therapeutic Activities:    [x] (44799 or 38957) Provided verbal/tactile cueing for activities related to improving balance, coordination, kinesthetic sense, posture, motor skill, proprioception and motor activation to allow for proper function of scapular, scapulothoracic and UE control with self care, carrying, lifting, driving/computer work.      Home Exercise Program:    [x] (61318) Reviewed/Progressed HEP activities related to strengthening, flexibility, endurance, ROM of scapular, scapulothoracic and UE control with self care, reaching, carrying, lifting, house/yardwork, driving/computer work  [] (39665) Reviewed/Progressed HEP activities related to improving balance, coordination, kinesthetic sense, posture, motor skill, proprioception of scapular, scapulothoracic and UE control with self care, reaching, carrying, lifting, house/yardwork, driving/computer work      Manual Treatments:  PROM / STM / Oscillations-Mobs:  G-I, II, III, IV (PA's, Inf., Post.)  [] (27245) Provided manual therapy to mobilize soft tissue/joints of cervical/CT, scapular GHJ and UE for the purpose of modulating pain, promoting relaxation,  increasing ROM, reducing/eliminating soft tissue swelling/inflammation/restriction, improving soft tissue extensibility and allowing for proper ROM for normal function with self care, reaching, carrying, lifting, house/yardwork, driving/computer work    Modalities:     [] GAME READY (VASO)- for significant edema, swelling, pain control. Charges:  Timed Code Treatment Minutes: 25'    Total Treatment Minutes: 27'       [] EVAL (LOW) [id number] (typically 20 minutes face-to-face)  [] EVAL (MOD) 15856 (typically 30 minutes face-to-face)  [] EVAL (HIGH) 50647 (typically 45 minutes face-to-face)  [] RE-EVAL     [x] IJ(75642) x 1    [] IONTO  [] NMR (70927) x   [] VASO  [] Manual (30746) x     [] Other:  [x] TA x1      [] Mech Traction (45061)  [] ES(attended) (19457)      [] ES (un) (63279):         ASSESSMENT:        GOALS:  Patient stated goal: learn to control the pain  [] Progressing: [] Met: [] Not Met: [] Adjusted    Therapist goals for Patient:   Short Term Goals: To be achieved in: 2 weeks  1. Independent in HEP and progression per patient tolerance, in order to prevent re-injury. [] Progressing: [] Met: [] Not Met: [] Adjusted   2. Patient will have a decrease in pain to facilitate improvement in movement, function, and ADLs as indicated by Functional Deficits. [] Progressing: [] Met: [] Not Met: [] Adjusted    Long Term Goals: To be achieved in: 4-6 weeks  1. Disability index score of 34% or less for the UEFI to assist with reaching prior level of function. [] Progressing: [] Met: [] Not Met: [] Adjusted  2.  Patient will demonstrate increased left shoulder flex AROM to greater than or equal to 165 deg, ABD AROM to greater than or equal to 175 deg, ER AROM to greater than or equal to 95 deg to allow for proper joint functioning as indicated by patients Functional Deficits. [] Progressing: [] Met: [] Not Met: [] Adjusted  3. Patient will demonstrate an increase in left shoulder (flex, ABD, IR, and ER) strength to 4/5 to allow for proper functional mobility as indicated by patients Functional Deficits. [] Progressing: [] Met: [] Not Met: [] Adjusted  4. Patient will return to ADLs without increased symptoms or restriction. [] Progressing: [] Met: [] Not Met: [] Adjusted  5. Patient will report knitting pain free. [] Progressing: [] Met: [] Not Met: [] Adjusted       Overall Progression Towards Functional goals/ Treatment Progress Update:  [] Patient is progressing as expected towards functional goals listed. [] Progression is slowed due to complexities/Impairments listed. [] Progression has been slowed due to co-morbidities. [x] Plan just implemented, too soon to assess goals progression <30days   [] Goals require adjustment due to lack of progress  [] Patient is not progressing as expected and requires additional follow up with physician  [] Other    Prognosis for POC: [x] Good [] Fair  [] Poor      Patient requires continued skilled intervention: [x] Yes  [] No      Treatment/Activity Tolerance:  [x] Patient tolerated treatment well [] Patient limited by fatique  [] Patient limited by pain  [] Patient limited by other medical complications  [] Other: Pt xiomara session well. Isometrics were held today due to pt's increased swelling and bruising down UE. She xiomara AROM well, denying pain. She reported discomfort after min stretching of biceps. 8/17    Patient education: Patient education on PT and plan of care including diagnosis, prognosis, treatment goals and options. Patient agrees with discussed POC and treatment and is aware of rehab process.  8/9      PLAN: See russell 8/9  [] Continue per plan of care [] Alter current plan (see comments above)  [x] Plan of care initiated [] Hold pending MD visit [] Discharge      Electronically signed by:  Wilber Perry PT, DPT     Note: If patient does not return for scheduled/ recommended follow up visits, this note will serve as a discharge from care along with most recent update on progress.

## 2021-08-24 ENCOUNTER — HOSPITAL ENCOUNTER (OUTPATIENT)
Dept: PHYSICAL THERAPY | Age: 76
Setting detail: THERAPIES SERIES
Discharge: HOME | End: 2021-08-24
Payer: MEDICARE

## 2021-08-24 PROCEDURE — 97110 THERAPEUTIC EXERCISES: CPT | Performed by: PHYSICAL THERAPIST

## 2021-08-24 PROCEDURE — 97112 NEUROMUSCULAR REEDUCATION: CPT | Performed by: PHYSICAL THERAPIST

## 2021-08-24 NOTE — FLOWSHEET NOTE
[facility], [address] [place], [address]  Phone: [phone]   Fax: [phone]    Physical Therapy Treatment Note/ Progress Report:       Date:  2021    Patient Name:  Conchita Wright    :  1945  MRN: [id number]  Restrictions/Precautions:    Medical/Treatment Diagnosis Information:  Diagnosis: Arthritis of left shoulder region - M19.012  Treatment Diagnosis: decreased ROM, strength, and high-level IADLs  Insurance/Certification information:  PT Insurance Information: [employer]  Physician Information:  Referring Practitioner: Dr. Komal Ramírez  Has the plan of care been signed (Y/N):        []  Yes  [x]  No     Date of Patient follow up with Physician: 2021      Is this a Progress Report:     []  Yes  [x]  No        Progress report/ Recertification will be due (10 Rx or 30 days whichever is less): 2021       Visit # Insurance Allowable Auth Required   4 BMN []  Yes []  No        Functional Scale:   UEFI  8/9 - 34% limitation          Latex Allergy:  [x]NO      []YES  Preferred Language for Healthcare:   [x]English       []other:      Pain level:  0 /10     SUBJECTIVE:  States she has not had any pain. Her bruising looks much better. She She states reaching out to the side, increases pain occasionally.          OBJECTIVE: See russell    Observation:    Test measurements:      RESTRICTIONS/PRECAUTIONS: none    Exercises/Interventions:   Exercise/Equipment Resistance/Repetitions Other comments   Stretching/PROM     Wand     Biceps flex PROM Biceps stretch 10 sec x 3 Added    Rhomboid stretch Wall walks 10 sec x 10 flex and scap Added         Isometrics     Retraction 10 sec x 10  Added 8        Weight shift     Flexion Abduction External Rotation Internal Rotation Biceps     Triceps          PRE's     Flexion 10 x 3 1# ^   Abduction     External Rotation 10 x 3 Added    Internal Rotation     Shrugs EXT     Reverse Flys     Serratus 10 x 3 1# ^8/24   Horizontal Abd with ER     Biceps 10 x 3 1# ^8/24   Triceps     Retraction          Cable Column/Theraband     External Rotation     Internal Rotation     Shrugs     Lats     Ext 10 x 3 green TB  Added 8/24   Flex     Scapular Retraction 10 x 3 green TB  Added 8/24   BIC     TRIC     PNF          Dynamic Stability     Ball on the wall 10 x 3 2-way, kickball Added 8/24        Plyoback          Manual interventions                   Therapeutic Exercise and NMR EXR  [x] (20434) Provided verbal/tactile cueing for activities related to strengthening, flexibility, endurance, ROM  for improvements in scapular, scapulothoracic and UE control with self care, reaching, carrying, lifting, house/yardwork, driving/computer work. [x] (32858) Provided verbal/tactile cueing for activities related to improving balance, coordination, kinesthetic sense, posture, motor skill, proprioception  to assist with  scapular, scapulothoracic and UE control with self care, reaching, carrying, lifting, house/yardwork, driving/computer work. Therapeutic Activities:    [x] (74219 or 37789) Provided verbal/tactile cueing for activities related to improving balance, coordination, kinesthetic sense, posture, motor skill, proprioception and motor activation to allow for proper function of scapular, scapulothoracic and UE control with self care, carrying, lifting, driving/computer work.      Home Exercise Program:    [x] (56688) Reviewed/Progressed HEP activities related to strengthening, flexibility, endurance, ROM of scapular, scapulothoracic and UE control with self care, reaching, carrying, lifting, house/yardwork, driving/computer work  [] (13169) Reviewed/Progressed HEP activities related to improving balance, coordination, kinesthetic sense, posture, motor skill, proprioception of scapular, scapulothoracic and UE control with self care, reaching, carrying, lifting, house/yardwork, driving/computer work      Manual Treatments:  PROM / STM / Oscillations-Mobs:  G-I, II, III, IV (PA's, Inf., Post.)  [] (38542) Provided manual therapy to mobilize soft tissue/joints of cervical/CT, scapular GHJ and UE for the purpose of modulating pain, promoting relaxation,  increasing ROM, reducing/eliminating soft tissue swelling/inflammation/restriction, improving soft tissue extensibility and allowing for proper ROM for normal function with self care, reaching, carrying, lifting, house/yardwork, driving/computer work    Modalities:     [] GAME READY (VASO)- for significant edema, swelling, pain control. Charges:  Timed Code Treatment Minutes: 25'    Total Treatment Minutes: 29'       [] EVAL (LOW) 71885 (typically 20 minutes face-to-face)  [] EVAL (MOD) 64577 (typically 30 minutes face-to-face)  [] EVAL (HIGH) 07661 (typically 45 minutes face-to-face)  [] RE-EVAL     [x] OJ(94640) x 1    [] IONTO  [x] NMR (74945) x 1  [] VASO  [] Manual (12453) x     [] Other:  [] TA x     [] Mech Traction (66631)  [] ES(attended) (79724)      [] ES (un) (03424):         ASSESSMENT:        GOALS:  Patient stated goal: learn to control the pain  [] Progressing: [] Met: [] Not Met: [] Adjusted    Therapist goals for Patient:   Short Term Goals: To be achieved in: 2 weeks  1. Independent in HEP and progression per patient tolerance, in order to prevent re-injury. [] Progressing: [] Met: [] Not Met: [] Adjusted   2. Patient will have a decrease in pain to facilitate improvement in movement, function, and ADLs as indicated by Functional Deficits. [] Progressing: [] Met: [] Not Met: [] Adjusted    Long Term Goals: To be achieved in: 4-6 weeks  1. Disability index score of 34% or less for the UEFI to assist with reaching prior level of function. [] Progressing: [] Met: [] Not Met: [] Adjusted  2.  Patient will demonstrate increased left shoulder flex AROM to greater than or equal to 165 deg, ABD AROM to greater than or equal to 175 deg, ER AROM to greater than or equal to 95 deg to allow for proper joint functioning as indicated by patients Functional Deficits. [] Progressing: [] Met: [] Not Met: [] Adjusted  3. Patient will demonstrate an increase in left shoulder (flex, ABD, IR, and ER) strength to 4/5 to allow for proper functional mobility as indicated by patients Functional Deficits. [] Progressing: [] Met: [] Not Met: [] Adjusted  4. Patient will return to ADLs without increased symptoms or restriction. [] Progressing: [] Met: [] Not Met: [] Adjusted  5. Patient will report knitting pain free. [] Progressing: [] Met: [] Not Met: [] Adjusted       Overall Progression Towards Functional goals/ Treatment Progress Update:  [] Patient is progressing as expected towards functional goals listed. [] Progression is slowed due to complexities/Impairments listed. [] Progression has been slowed due to co-morbidities. [x] Plan just implemented, too soon to assess goals progression <30days   [] Goals require adjustment due to lack of progress  [] Patient is not progressing as expected and requires additional follow up with physician  [] Other    Prognosis for POC: [x] Good [] Fair  [] Poor      Patient requires continued skilled intervention: [x] Yes  [] No      Treatment/Activity Tolerance:  [x] Patient tolerated treatment well [] Patient limited by fatique  [] Patient limited by pain  [] Patient limited by other medical complications  [] Other: Pt xiomara session well. She xiomara increased exercises today with improved healing of bruising. She reported muscular fatigue with the addition of ball on the wall. 23-14-20-09    Patient education: Patient education on PT and plan of care including diagnosis, prognosis, treatment goals and options. Patient agrees with discussed POC and treatment and is aware of rehab process.  8/9      PLAN: See eval 8/9  [] Continue per plan of care [] Alter current plan (see comments above)  [x] Plan of care initiated [] Hold pending MD visit [] Discharge      Electronically signed by:  Lorna Edwards PT, DPT     Note: If patient does not return for scheduled/ recommended follow up visits, this note will serve as a discharge from care along with most recent update on progress.

## 2021-08-31 ENCOUNTER — HOSPITAL ENCOUNTER (OUTPATIENT)
Dept: PHYSICAL THERAPY | Age: 76
Setting detail: THERAPIES SERIES
Discharge: HOME | End: 2021-08-31
Payer: MEDICARE

## 2021-08-31 PROCEDURE — 97110 THERAPEUTIC EXERCISES: CPT | Performed by: PHYSICAL THERAPIST

## 2021-08-31 NOTE — DISCHARGE SUMMARY
[facility] [address] [place], [address]  Phone: [phone]   Fax: [phone]      Physical Therapy Discharge Summary    Dear Dr. Camila Colindres,    We had the pleasure of treating the following patient for physical therapy services at [address] [place] [address]. A summary of our findings can be found in the updated assessment below. This includes our plan of care. If you have any questions or concerns regarding these findings, please do not hesitate to contact me at the office phone number checked above. Thank you for the referral.     Physician Signature:________________________________Date:__________________  By signing above (or electronic signature), therapists plan is approved by physician      Overall Response to Treatment:   [x]Patient is responding well to treatment and improvement is noted with regards  to goals   []Patient should continue to improve in reasonable time if they continue HEP   []Patient has plateaued and is no longer responding to skilled PT intervention    []Patient is getting worse and would benefit from return to referring MD   []Patient unable to adhere to initial POC   [x]Other: Pt presents to PT pain free. She has returned to ADLs without any concerns at home. She demonstrates improved L shoulder ROM and strength. She does, however, continue to lack min L shoulder ABD ROM and strength compared to [facility]. She has met all goes, except shoulder ABD ROM and strength. She is independent with her HEP. Therefore, pt is being D/C to HEP at this time. She was educated to call with any questions and concerns.     Date range of previous POC Visits: 21 - 21    Total Visits: 5          Physical Therapy Treatment Note/ Progress Report:       Date:  2021    Patient Name:  Sandy Bajwa    :  1945  MRN: [id number]  Restrictions/Precautions:    Medical/Treatment Diagnosis Information:  Diagnosis: Arthritis of left shoulder region - M19.012  Treatment Diagnosis: decreased ROM, strength, and high-level IADLs  Insurance/Certification information:  PT Insurance Information: [employer]  Physician Information:  Referring Practitioner: Dr. Jah Chambers  Has the plan of care been signed (Y/N):        []  Yes  [x]  No     Date of Patient follow up with Physician: 2 Sept 2021      Is this a Progress Report:     [x]  Yes  []  No          Visit # Insurance Allowable Auth Required   5 BMN []  Yes []  No        Functional Scale:   UEFI  8/9 - 34% limitation  8/31 - 1% limitation           Latex Allergy:  [x]NO      []YES  Preferred Language for Healthcare:   [x]English       []other:      Pain level:  0 /10 8/31    SUBJECTIVE:  States she felt a little bit of a twinge when she was in the shower. She states overall, she has not had any pain. She states that her bruising is almost healed. She states she is able to do overhead activities now without any problems. 8/31    OBJECTIVE: 8/31   Observation:    Test measurements:            ROM AROM     L R   Flexion 175 169   Abduction 156 176   ER 94 98   IR 52 55   Other(cervical)                     Strength L R Comment   Flexion 4/5  4/5     Abduction 4-/5  4/5     ER 4/5 4/5     IR 4/5 4/5               Lower Trap         Mid Trap         Rhomboids         Biceps 4/5 4/5     Triceps 4/5 4/5           Joint mobility:               [x]? Normal                       []?Hypo              []?Hyper     Palpation: no TTP      Functional Mobility/Transfers: Independent      Posture: Rounded shoulders      Gait: (include devices/WB status):  WNL      RESTRICTIONS/PRECAUTIONS: none    Exercises/Interventions:   Exercise/Equipment Resistance/Repetitions Other comments   Stretching/PROM     Wand     Biceps flex PROM Biceps stretch Rhomboid stretch Wall walks 10 sec x 10 flex and scap Added 8/9        Isometrics     Retraction Added 8/9   Biceps    Triceps        PRE's    Flexion ^8/24 Abduction    External Rotation Added 8/24   Internal Rotation    Shrugs    EXT    Reverse Flys    Serratus ^8/24   Horizontal Abd with ER    Biceps ^8/24   Triceps    Retraction        Cable Column/Theraband    External Rotation    Internal Rotation    Shrugs    Lats    Ext Added 8/24   Flex    Scapular Retraction Added 8/24   BIC    TRIC    PNF        Dynamic Stability    Ball on the wall Added 8/24        [employer]          Manual interventions                   Therapeutic Exercise and NMR EXR  [x] (69938) Provided verbal/tactile cueing for activities related to strengthening, flexibility, endurance, ROM  for improvements in scapular, scapulothoracic and UE control with self care, reaching, carrying, lifting, house/yardwork, driving/computer work. [x] (23427) Provided verbal/tactile cueing for activities related to improving balance, coordination, kinesthetic sense, posture, motor skill, proprioception  to assist with  scapular, scapulothoracic and UE control with self care, reaching, carrying, lifting, house/yardwork, driving/computer work. Therapeutic Activities:    [x] (33718 or 73468) Provided verbal/tactile cueing for activities related to improving balance, coordination, kinesthetic sense, posture, motor skill, proprioception and motor activation to allow for proper function of scapular, scapulothoracic and UE control with self care, carrying, lifting, driving/computer work.      Home Exercise Program:    [x] (96465) Reviewed/Progressed HEP activities related to strengthening, flexibility, endurance, ROM of scapular, scapulothoracic and UE control with self care, reaching, carrying, lifting, house/yardwork, driving/computer work  [] (22013) Reviewed/Progressed HEP activities related to improving balance, coordination, kinesthetic sense, posture, motor skill, proprioception of scapular, scapulothoracic and UE control with self care, reaching, carrying, lifting, house/yardwork, driving/computer work Manual Treatments:  PROM / STM / Oscillations-Mobs:  G-I, II, III, IV (PA's, Inf., Post.)  [] (29321) Provided manual therapy to mobilize soft tissue/joints of cervical/CT, scapular GHJ and UE for the purpose of modulating pain, promoting relaxation,  increasing ROM, reducing/eliminating soft tissue swelling/inflammation/restriction, improving soft tissue extensibility and allowing for proper ROM for normal function with self care, reaching, carrying, lifting, house/yardwork, driving/computer work    Modalities:     [] GAME READY (VASO)- for significant edema, swelling, pain control. Charges:  Timed Code Treatment Minutes: 15'    Total Treatment Minutes: 15'        [] EVAL (LOW) 455 1011 (typically 20 minutes face-to-face)  [] EVAL (MOD) 24079 (typically 30 minutes face-to-face)  [] EVAL (HIGH) 13450 (typically 45 minutes face-to-face)  [] RE-EVAL     [x] MK(67751) x 1    [] IONTO  [] NMR (09021) x   [] VASO  [] Manual (87797) x     [] Other:  [] TA x     [] Mech Traction (04286)  [] ES(attended) (08393)      [] ES (un) (99884):         ASSESSMENT:        GOALS: 8/31  Patient stated goal: learn to control the pain  [] Progressing: [x] Met: [] Not Met: [] Adjusted    Therapist goals for Patient:   Short Term Goals: To be achieved in: 2 weeks  1. Independent in HEP and progression per patient tolerance, in order to prevent re-injury. [] Progressing: [x] Met: [] Not Met: [] Adjusted   2. Patient will have a decrease in pain to facilitate improvement in movement, function, and ADLs as indicated by Functional Deficits. [] Progressing: [x] Met: [] Not Met: [] Adjusted    Long Term Goals: To be achieved in: 4-6 weeks  1. Disability index score of 34% or less for the UEFI to assist with reaching prior level of function. [] Progressing: [x] Met: [] Not Met: [] Adjusted  2.  Patient will demonstrate increased left shoulder flex AROM to greater than or equal to 165 deg, ABD AROM to greater than or equal to 175 deg, ER AROM to greater than or equal to 95 deg to allow for proper joint functioning as indicated by patients Functional Deficits. [] Progressing: [x] Partially Met, except shoulder ABD: [] Not Met: [] Adjusted  3. Patient will demonstrate an increase in left shoulder (flex, ABD, IR, and ER) strength to 4/5 to allow for proper functional mobility as indicated by patients Functional Deficits. [] Progressing: [x] Partially Met, except shoulder ABD: [] Not Met: [] Adjusted  4. Patient will return to ADLs without increased symptoms or restriction. [] Progressing: [x] Met: [] Not Met: [] Adjusted  5. Patient will report knitting pain free. [] Progressing: [x] Met: [] Not Met: [] Adjusted       Overall Progression Towards Functional goals/ Treatment Progress Update:  [x] Patient is progressing as expected towards functional goals listed. [] Progression is slowed due to complexities/Impairments listed. [] Progression has been slowed due to co-morbidities. [] Plan just implemented, too soon to assess goals progression <30days   [] Goals require adjustment due to lack of progress  [] Patient is not progressing as expected and requires additional follow up with physician  [] Other    Prognosis for POC: [x] Good [] Fair  [] Poor      Patient requires continued skilled intervention: [x] Yes  [] No      Treatment/Activity Tolerance:  [x] Patient tolerated treatment well [] Patient limited by fatique  [] Patient limited by pain  [] Patient limited by other medical complications  [] Other: Pt presents to PT pain free. She has returned to ADLs without any concerns at home. She demonstrates improved L shoulder ROM and strength. She does, however, continue to lack min L shoulder ABD ROM and strength compared to [facility]. She has met all goes, except shoulder ABD ROM and strength. She is independent with her HEP. Therefore, pt is being D/C to HEP at this time. She was educated to call with any questions and concerns. 8/31    Patient education: Patient education on PT and plan of care including diagnosis, prognosis, treatment goals and options. Patient agrees with discussed POC and treatment and is aware of rehab process. 8/9      PLAN: 8/31  [] Continue per plan of care [] Alter current plan (see comments above)  [] Plan of care initiated [] Hold pending MD visit [x] Discharge      Electronically signed by:  Davion Mayer, PT, DPT     Note: If patient does not return for scheduled/ recommended follow up visits, this note will serve as a discharge from care along with most recent update on progress.

## 2021-09-02 ENCOUNTER — OFFICE VISIT (OUTPATIENT)
Dept: ORTHOPEDIC SURGERY | Age: 76
End: 2021-09-02
Payer: MEDICARE

## 2021-09-02 VITALS
WEIGHT: 131 LBS | DIASTOLIC BLOOD PRESSURE: 82 MMHG | HEART RATE: 90 BPM | HEIGHT: 62 IN | BODY MASS INDEX: 24.11 KG/M2 | SYSTOLIC BLOOD PRESSURE: 155 MMHG

## 2021-09-02 DIAGNOSIS — S46.112D RUPTURE OF LEFT LONG HEAD BICEPS TENDON, SUBSEQUENT ENCOUNTER: Primary | ICD-10-CM

## 2021-09-02 PROCEDURE — 1090F PRES/ABSN URINE INCON ASSESS: CPT | Performed by: ORTHOPAEDIC SURGERY

## 2021-09-02 PROCEDURE — 1036F TOBACCO NON-USER: CPT | Performed by: ORTHOPAEDIC SURGERY

## 2021-09-02 PROCEDURE — G8420 CALC BMI NORM PARAMETERS: HCPCS | Performed by: ORTHOPAEDIC SURGERY

## 2021-09-02 PROCEDURE — 1123F ACP DISCUSS/DSCN MKR DOCD: CPT | Performed by: ORTHOPAEDIC SURGERY

## 2021-09-02 PROCEDURE — 99212 OFFICE O/P EST SF 10 MIN: CPT | Performed by: ORTHOPAEDIC SURGERY

## 2021-09-02 PROCEDURE — G8427 DOCREV CUR MEDS BY ELIG CLIN: HCPCS | Performed by: ORTHOPAEDIC SURGERY

## 2021-09-02 PROCEDURE — G8399 PT W/DXA RESULTS DOCUMENT: HCPCS | Performed by: ORTHOPAEDIC SURGERY

## 2021-09-02 PROCEDURE — 4040F PNEUMOC VAC/ADMIN/RCVD: CPT | Performed by: ORTHOPAEDIC SURGERY

## 2021-09-02 NOTE — PROGRESS NOTES
Juliette Ayers returns today to follow-up her left shoulder. She reports no pain at all at this time and is pleased. General Exam:    Vitals: Blood pressure (!) 155/82, pulse 90, height 5' 2\" (1.575 m), weight 131 lb (59.4 kg), not currently breastfeeding. Constitutional: Patient is adequately groomed with no evidence of malnutrition  Mental Status: The patient is oriented to time, place and person. The patient's mood and affect are appropriate. Left shoulder has no swelling or ecchymosis. She has obvious deformity of the long head bicep. She has full glenohumeral motion and good strength throughout the cuff and I cannot reproduce discomfort today. Assessment: Improving left shoulder discomfort with left shoulder arthritis and long head bicep rupture. Plan: I have encouraged her to maintain her exercise program as best she can. She will follow up with me on an as-needed basis.

## 2021-10-18 ENCOUNTER — OFFICE VISIT (OUTPATIENT)
Dept: ORTHOPEDIC SURGERY | Age: 76
End: 2021-10-18
Payer: MEDICARE

## 2021-10-18 VITALS
WEIGHT: 131 LBS | HEIGHT: 62 IN | SYSTOLIC BLOOD PRESSURE: 157 MMHG | BODY MASS INDEX: 24.11 KG/M2 | DIASTOLIC BLOOD PRESSURE: 75 MMHG | HEART RATE: 84 BPM

## 2021-10-18 DIAGNOSIS — M25.512 ACUTE PAIN OF LEFT SHOULDER: ICD-10-CM

## 2021-10-18 DIAGNOSIS — M19.012 ARTHRITIS OF LEFT SHOULDER REGION: ICD-10-CM

## 2021-10-18 DIAGNOSIS — M75.82 TENDINITIS OF LEFT ROTATOR CUFF: ICD-10-CM

## 2021-10-18 DIAGNOSIS — S46.112D RUPTURE OF LEFT LONG HEAD BICEPS TENDON, SUBSEQUENT ENCOUNTER: Primary | ICD-10-CM

## 2021-10-18 PROCEDURE — G8428 CUR MEDS NOT DOCUMENT: HCPCS | Performed by: ORTHOPAEDIC SURGERY

## 2021-10-18 PROCEDURE — 1090F PRES/ABSN URINE INCON ASSESS: CPT | Performed by: ORTHOPAEDIC SURGERY

## 2021-10-18 PROCEDURE — G8420 CALC BMI NORM PARAMETERS: HCPCS | Performed by: ORTHOPAEDIC SURGERY

## 2021-10-18 PROCEDURE — 1036F TOBACCO NON-USER: CPT | Performed by: ORTHOPAEDIC SURGERY

## 2021-10-18 PROCEDURE — G8484 FLU IMMUNIZE NO ADMIN: HCPCS | Performed by: ORTHOPAEDIC SURGERY

## 2021-10-18 PROCEDURE — 99212 OFFICE O/P EST SF 10 MIN: CPT | Performed by: ORTHOPAEDIC SURGERY

## 2021-10-18 PROCEDURE — 1123F ACP DISCUSS/DSCN MKR DOCD: CPT | Performed by: ORTHOPAEDIC SURGERY

## 2021-10-18 PROCEDURE — 4040F PNEUMOC VAC/ADMIN/RCVD: CPT | Performed by: ORTHOPAEDIC SURGERY

## 2021-10-18 PROCEDURE — G8399 PT W/DXA RESULTS DOCUMENT: HCPCS | Performed by: ORTHOPAEDIC SURGERY

## 2021-10-18 RX ORDER — BENRALIZUMAB 30 MG/ML
INJECTION, SOLUTION SUBCUTANEOUS
COMMUNITY
Start: 2021-09-28

## 2021-10-18 NOTE — PROGRESS NOTES
Tere Cortez returns today with complaints about the left shoulder. She was given an injection about 2-1/2 months ago and that was helpful. She had a ruptured long head bicep as well. She did physical therapy and was doing very well until a few weeks ago. She now complains of a sensation that the shoulder shifts and pain that radiates from the shoulder down the bicep. She takes tramadol for restless legs and baclofen for neck pain as well as ibuprofen for her shoulder pain. Pain can be as bad as 8 out of 10 but it is constantly 2 out of 10. General Exam:    Vitals: Blood pressure (!) 157/75, pulse 84, height 5' 2\" (1.575 m), weight 131 lb (59.4 kg), not currently breastfeeding. Constitutional: Patient is adequately groomed with no evidence of malnutrition  Mental Status: The patient is oriented to time, place and person. The patient's mood and affect are appropriate. Left shoulder has discomfort with abduction and external rotation. She has no tenderness. She has significant pain stressing the supraspinatus. She has mild crepitation. Assessment: Ongoing left shoulder pain despite injections and physical therapy. Plan: MRI scan of the shoulder. Follow-up with me after the scan.

## 2021-10-22 NOTE — TELEPHONE ENCOUNTER
Last office visit :07/30/2021    Future Appointments   Date Time Provider Lex Mcdonnell   10/25/2021 10:30 AM Will MD [facility] 61 [facility]   10/29/2021  9:20 AM Miko Friend DO [place] [facility]

## 2021-10-24 RX ORDER — SIMVASTATIN 40 MG
TABLET ORAL
Qty: 90 TABLET | Refills: 0 | Status: SHIPPED | OUTPATIENT
Start: 2021-10-24 | End: 2021-10-29 | Stop reason: SDUPTHER

## 2021-10-25 ENCOUNTER — OFFICE VISIT (OUTPATIENT)
Dept: ORTHOPEDIC SURGERY | Age: 76
End: 2021-10-25
Payer: MEDICARE

## 2021-10-25 VITALS
SYSTOLIC BLOOD PRESSURE: 143 MMHG | DIASTOLIC BLOOD PRESSURE: 66 MMHG | BODY MASS INDEX: 24.11 KG/M2 | HEIGHT: 62 IN | WEIGHT: 131 LBS | HEART RATE: 86 BPM

## 2021-10-25 DIAGNOSIS — M19.012 ARTHRITIS OF LEFT SHOULDER REGION: ICD-10-CM

## 2021-10-25 DIAGNOSIS — S46.112D RUPTURE OF LEFT LONG HEAD BICEPS TENDON, SUBSEQUENT ENCOUNTER: ICD-10-CM

## 2021-10-25 DIAGNOSIS — M75.82 TENDINITIS OF LEFT ROTATOR CUFF: ICD-10-CM

## 2021-10-25 DIAGNOSIS — M25.512 ACUTE PAIN OF LEFT SHOULDER: Primary | ICD-10-CM

## 2021-10-25 PROCEDURE — G8399 PT W/DXA RESULTS DOCUMENT: HCPCS | Performed by: ORTHOPAEDIC SURGERY

## 2021-10-25 PROCEDURE — 1036F TOBACCO NON-USER: CPT | Performed by: ORTHOPAEDIC SURGERY

## 2021-10-25 PROCEDURE — G8427 DOCREV CUR MEDS BY ELIG CLIN: HCPCS | Performed by: ORTHOPAEDIC SURGERY

## 2021-10-25 PROCEDURE — 1090F PRES/ABSN URINE INCON ASSESS: CPT | Performed by: ORTHOPAEDIC SURGERY

## 2021-10-25 PROCEDURE — 4040F PNEUMOC VAC/ADMIN/RCVD: CPT | Performed by: ORTHOPAEDIC SURGERY

## 2021-10-25 PROCEDURE — G8420 CALC BMI NORM PARAMETERS: HCPCS | Performed by: ORTHOPAEDIC SURGERY

## 2021-10-25 PROCEDURE — 99214 OFFICE O/P EST MOD 30 MIN: CPT | Performed by: ORTHOPAEDIC SURGERY

## 2021-10-25 PROCEDURE — G8484 FLU IMMUNIZE NO ADMIN: HCPCS | Performed by: ORTHOPAEDIC SURGERY

## 2021-10-25 PROCEDURE — 1123F ACP DISCUSS/DSCN MKR DOCD: CPT | Performed by: ORTHOPAEDIC SURGERY

## 2021-10-25 RX ORDER — MELOXICAM 15 MG/1
15 TABLET ORAL DAILY
Qty: 30 TABLET | Refills: 2 | Status: SHIPPED | OUTPATIENT
Start: 2021-10-25 | End: 2021-11-29 | Stop reason: ALTCHOICE

## 2021-10-25 NOTE — PROGRESS NOTES
Jeff Shoulders returns today to follow-up her left shoulder. She is pain-free in the office today but sometimes can have pain is about 4 or 5 out of 10. We obtained her MRI. General Exam:    Vitals: Blood pressure (!) 143/66, pulse 86, height 5' 2\" (1.575 m), weight 131 lb (59.4 kg), not currently breastfeeding. Constitutional: Patient is adequately groomed with no evidence of malnutrition  Mental Status: The patient is oriented to time, place and person. The patient's mood and affect are appropriate. Left shoulder has mild discomfort with abduction. She has deformity consistent with her long head bicep rupture. She has mild pain with Pickett maneuver. She has trace crepitation. Left shoulder MR is reviewed.   It demonstrates:       FINDINGS:  The rotator group muscles and deltoid muscle are normal.       Acromion is type 2 in morphology and slight downsloping of the acromion is present at the    lateral aspect.       Supraspinatus tendinosis is moderate with bursal-sided fraying and a near full-thickness tear    at the junction of the insertion and critical zone measuring 3mm x 3mm.  See coronal T2 image    10.       The infraspinatus demonstrates mild hypertrophic tendinosis and the teres minor is intact.       Subscapularis tendinosis with fraying at the anterior surface and interstitial low-grade    tearing.       The pectoralis minor demonstrates anatomic variation with a thin insertion upon the coracoid    process and an accessory prominent tendinous insertion extending beyond the coracoid to insert    upon the glenohumeral capsule and supraspinatus tendon.  Furthermore, the    subacromial-subdeltoid bursa demonstrates an accessory recess paralleling the accessory    pectoralis minor tendon medially.  See axial PD with fat-saturation images 8 through 11.  The    tendon inserting upon the coracoid is irregular and frayed with partial tearing.       Moderate subacromial-subdeltoid bursitis.       Anatomic variation with a thick rotator cable.       The long head biceps is torn and the free end is retracted below the edge of the field of view.       Glenohumeral joint demonstrates grade 3 chondromalacia, low-grade degenerative arthrosis, and a    subchondral degenerative cyst at the anterior-superior glenoid.       Small-sized glenohumeral joint effusion with synovitis.       Degenerative tearing of the superior and posterior-superior labrum and posterior-superior    glenoid deficiency.       Notching and pitting of the humeral head anteriorly, laterally and posteriorly-superiorly.       CONCLUSION:   1. Rupture of the long head biceps tendon with the free end retracted distally beyond the edge    of the field of view. 2. Near full-thickness tear of the supraspinatus at the junction of the critical zone and    insertion.  The tear measures 3mm x 3mm. 3. Anatomic variation with an accessory tendon of the pectoralis minor inserting upon the    glenohumeral capsule and supraspinatus tendon. 4. Anatomic variation with a thick rotator cable. 5. Grade 3 glenohumeral chondromalacia. 6. Glenohumeral joint effusion and synovitis. Reviewed these findings on the report and the images at length with the patient. Assessment: Left shoulder rotator cuff tendinitis, arthritis, status post bicep tendon rupture, tiny partial-thickness rotator cuff tear. Plan: At this point she does not require surgery. We discussed surgery for rotator cuff tears and she understands that the anchors we use are actually larger than the size of her small partial-thickness tear. She may require arthroplasty at some point but it is unlikely. We discussed the option of a repeat cortisone injection or trying oral anti-inflammatory. Currently she would like to try oral anti-inflammatory. Prescription for meloxicam was provided. She will take that with dinner. Follow-up with me in a month.     All of her questions have been answered at length.     Medical decision making today was moderate

## 2021-11-08 DIAGNOSIS — E55.9 VITAMIN D DEFICIENCY: ICD-10-CM

## 2021-11-08 DIAGNOSIS — E78.2 MIXED HYPERLIPIDEMIA: ICD-10-CM

## 2021-11-08 DIAGNOSIS — I10 ESSENTIAL HYPERTENSION: ICD-10-CM

## 2021-11-08 LAB
A/G RATIO: 1.9 (ref 1.1–2.2)
ALBUMIN SERPL-MCNC: 4 G/DL (ref 3.4–5)
ALP BLD-CCNC: 71 U/L (ref 40–129)
ALT SERPL-CCNC: 23 U/L (ref 10–40)
ANION GAP SERPL CALCULATED.3IONS-SCNC: 12 MMOL/L (ref 3–16)
AST SERPL-CCNC: 31 U/L (ref 15–37)
BASOPHILS ABSOLUTE: 0 K/UL (ref 0–0.2)
BASOPHILS RELATIVE PERCENT: 0.1 %
BILIRUB SERPL-MCNC: <0.2 MG/DL (ref 0–1)
BUN BLDV-MCNC: 12 MG/DL (ref 7–20)
CALCIUM SERPL-MCNC: 9.4 MG/DL (ref 8.3–10.6)
CHLORIDE BLD-SCNC: 104 MMOL/L (ref 99–110)
CHOLESTEROL, TOTAL: 182 MG/DL (ref 0–199)
CO2: 25 MMOL/L (ref 21–32)
CREAT SERPL-MCNC: 0.6 MG/DL (ref 0.6–1.2)
EOSINOPHILS ABSOLUTE: 0 K/UL (ref 0–0.6)
EOSINOPHILS RELATIVE PERCENT: 0 %
GFR AFRICAN AMERICAN: >60
GFR NON-AFRICAN AMERICAN: >60
GLUCOSE BLD-MCNC: 91 MG/DL (ref 70–99)
HCT VFR BLD CALC: 36.9 % (ref 36–48)
HDLC SERPL-MCNC: 48 MG/DL (ref 40–60)
HEMOGLOBIN: 12 G/DL (ref 12–16)
LDL CHOLESTEROL CALCULATED: 96 MG/DL
LYMPHOCYTES ABSOLUTE: 1.7 K/UL (ref 1–5.1)
LYMPHOCYTES RELATIVE PERCENT: 26.4 %
MCH RBC QN AUTO: 28.8 PG (ref 26–34)
MCHC RBC AUTO-ENTMCNC: 32.5 G/DL (ref 31–36)
MCV RBC AUTO: 88.8 FL (ref 80–100)
MONOCYTES ABSOLUTE: 0.7 K/UL (ref 0–1.3)
MONOCYTES RELATIVE PERCENT: 10.9 %
NEUTROPHILS ABSOLUTE: 3.9 K/UL (ref 1.7–7.7)
NEUTROPHILS RELATIVE PERCENT: 62.6 %
PDW BLD-RTO: 13.8 % (ref 12.4–15.4)
PLATELET # BLD: 257 K/UL (ref 135–450)
PMV BLD AUTO: 9.2 FL (ref 5–10.5)
POTASSIUM SERPL-SCNC: 4.2 MMOL/L (ref 3.5–5.1)
RBC # BLD: 4.16 M/UL (ref 4–5.2)
SODIUM BLD-SCNC: 141 MMOL/L (ref 136–145)
TOTAL PROTEIN: 6.1 G/DL (ref 6.4–8.2)
TRIGL SERPL-MCNC: 190 MG/DL (ref 0–150)
VITAMIN D 25-HYDROXY: 43.8 NG/ML
VLDLC SERPL CALC-MCNC: 38 MG/DL
WBC # BLD: 6.3 K/UL (ref 4–11)

## 2021-11-29 ENCOUNTER — OFFICE VISIT (OUTPATIENT)
Dept: ORTHOPEDIC SURGERY | Age: 76
End: 2021-11-29
Payer: MEDICARE

## 2021-11-29 VITALS — BODY MASS INDEX: 24.48 KG/M2 | WEIGHT: 133 LBS | HEIGHT: 62 IN

## 2021-11-29 DIAGNOSIS — S46.112D RUPTURE OF LEFT LONG HEAD BICEPS TENDON, SUBSEQUENT ENCOUNTER: ICD-10-CM

## 2021-11-29 DIAGNOSIS — M19.012 ARTHRITIS OF LEFT SHOULDER REGION: ICD-10-CM

## 2021-11-29 DIAGNOSIS — M25.512 ACUTE PAIN OF LEFT SHOULDER: Primary | ICD-10-CM

## 2021-11-29 PROCEDURE — G8420 CALC BMI NORM PARAMETERS: HCPCS | Performed by: ORTHOPAEDIC SURGERY

## 2021-11-29 PROCEDURE — G8427 DOCREV CUR MEDS BY ELIG CLIN: HCPCS | Performed by: ORTHOPAEDIC SURGERY

## 2021-11-29 PROCEDURE — G8484 FLU IMMUNIZE NO ADMIN: HCPCS | Performed by: ORTHOPAEDIC SURGERY

## 2021-11-29 PROCEDURE — 1123F ACP DISCUSS/DSCN MKR DOCD: CPT | Performed by: ORTHOPAEDIC SURGERY

## 2021-11-29 PROCEDURE — 1036F TOBACCO NON-USER: CPT | Performed by: ORTHOPAEDIC SURGERY

## 2021-11-29 PROCEDURE — 4040F PNEUMOC VAC/ADMIN/RCVD: CPT | Performed by: ORTHOPAEDIC SURGERY

## 2021-11-29 PROCEDURE — 99213 OFFICE O/P EST LOW 20 MIN: CPT | Performed by: ORTHOPAEDIC SURGERY

## 2021-11-29 PROCEDURE — G8399 PT W/DXA RESULTS DOCUMENT: HCPCS | Performed by: ORTHOPAEDIC SURGERY

## 2021-11-29 PROCEDURE — 1090F PRES/ABSN URINE INCON ASSESS: CPT | Performed by: ORTHOPAEDIC SURGERY

## 2021-11-29 RX ORDER — FLUOCINOLONE ACETONIDE 0.1 MG/ML
SOLUTION TOPICAL
COMMUNITY
Start: 2021-10-18

## 2021-11-29 RX ORDER — TIOTROPIUM BROMIDE INHALATION SPRAY 1.56 UG/1
SPRAY, METERED RESPIRATORY (INHALATION)
COMMUNITY
Start: 2021-11-07

## 2021-11-29 NOTE — PROGRESS NOTES
Yvonne Carcamo turns today for her left shoulder. She had a cortisone injection in August and did some therapy at that time. We obtained an MRI scan last month. She is now complaining of pain that is 7 out of 10 at its worst.  She says she feels dramatically better when she puts a [address] over her left shoulder to provide some warmth. She says the meloxicam made her feel worse so she stopped taking it. She is very frustrated today. General Exam:    Vitals: Height 5' 2\" (1.575 m), weight 133 lb (60.3 kg), not currently breastfeeding. Constitutional: Patient is adequately groomed with no evidence of malnutrition  Mental Status: The patient is oriented to time, place and person. The patient's mood and affect are appropriate. Left shoulder has tenderness throughout. She maintains good strength in the cuff but has discomfort with abduction.     I again reviewed her MRI which demonstrates:  FINDINGS:  The rotator group muscles and deltoid muscle are normal.       Acromion is type 2 in morphology and slight downsloping of the acromion is present at the    lateral aspect.       Supraspinatus tendinosis is moderate with bursal-sided fraying and a near full-thickness tear    at the junction of the insertion and critical zone measuring 3mm x 3mm.  See coronal T2 image    10.       The infraspinatus demonstrates mild hypertrophic tendinosis and the teres minor is intact.       Subscapularis tendinosis with fraying at the anterior surface and interstitial low-grade    tearing.       The pectoralis minor demonstrates anatomic variation with a thin insertion upon the coracoid    process and an accessory prominent tendinous insertion extending beyond the coracoid to insert    upon the glenohumeral capsule and supraspinatus tendon.  Furthermore, the    subacromial-subdeltoid bursa demonstrates an accessory recess paralleling the accessory    pectoralis minor tendon medially.  See axial PD with fat-saturation images 8 through 11.  The    tendon inserting upon the coracoid is irregular and frayed with partial tearing.       Moderate subacromial-subdeltoid bursitis.       Anatomic variation with a thick rotator cable.       The long head biceps is torn and the free end is retracted below the edge of the field of view.       Glenohumeral joint demonstrates grade 3 chondromalacia, low-grade degenerative arthrosis, and a    subchondral degenerative cyst at the anterior-superior glenoid.       Small-sized glenohumeral joint effusion with synovitis.       Degenerative tearing of the superior and posterior-superior labrum and posterior-superior    glenoid deficiency.       Notching and pitting of the humeral head anteriorly, laterally and posteriorly-superiorly.       CONCLUSION:   1. Rupture of the long head biceps tendon with the free end retracted distally beyond the edge    of the field of view. 2. Near full-thickness tear of the supraspinatus at the junction of the critical zone and    insertion.  The tear measures 3mm x 3mm. 3. Anatomic variation with an accessory tendon of the pectoralis minor inserting upon the    glenohumeral capsule and supraspinatus tendon. 4. Anatomic variation with a thick rotator cable. 5. Grade 3 glenohumeral chondromalacia. 6. Glenohumeral joint effusion and synovitis. Assessment: Refractory and at times debilitating left shoulder discomfort with tiny rotator cuff injury and moderate glenohumeral chondromalacia, status post left bicep tendon rupture. Plan: I would like her to get a second opinion from Dr. Esther Hong to determine if he feels like reverse arthroplasty is indicated. I am fearful that arthroscopy given her advanced age and complaints of severe pain is unlikely to provide permanent benefit.   She is asking for a cortisone shot today but I am asking her to hold off until she sees Dr. Esther Hong since she already had a cortisone injection and I do not want to provide two if she is going to end up in surgery.

## 2021-12-02 ENCOUNTER — OFFICE VISIT (OUTPATIENT)
Dept: ORTHOPEDIC SURGERY | Age: 76
End: 2021-12-02
Payer: MEDICARE

## 2021-12-02 VITALS — WEIGHT: 133 LBS | HEIGHT: 62 IN | BODY MASS INDEX: 24.48 KG/M2

## 2021-12-02 DIAGNOSIS — M25.512 ACUTE PAIN OF LEFT SHOULDER: ICD-10-CM

## 2021-12-02 DIAGNOSIS — M19.012 ARTHRITIS OF LEFT SHOULDER REGION: ICD-10-CM

## 2021-12-02 DIAGNOSIS — S46.112D RUPTURE OF LEFT LONG HEAD BICEPS TENDON, SUBSEQUENT ENCOUNTER: Primary | ICD-10-CM

## 2021-12-02 PROCEDURE — G8420 CALC BMI NORM PARAMETERS: HCPCS | Performed by: ORTHOPAEDIC SURGERY

## 2021-12-02 PROCEDURE — 20610 DRAIN/INJ JOINT/BURSA W/O US: CPT | Performed by: ORTHOPAEDIC SURGERY

## 2021-12-02 PROCEDURE — 99214 OFFICE O/P EST MOD 30 MIN: CPT | Performed by: ORTHOPAEDIC SURGERY

## 2021-12-02 PROCEDURE — G8399 PT W/DXA RESULTS DOCUMENT: HCPCS | Performed by: ORTHOPAEDIC SURGERY

## 2021-12-02 PROCEDURE — 1123F ACP DISCUSS/DSCN MKR DOCD: CPT | Performed by: ORTHOPAEDIC SURGERY

## 2021-12-02 PROCEDURE — G8484 FLU IMMUNIZE NO ADMIN: HCPCS | Performed by: ORTHOPAEDIC SURGERY

## 2021-12-02 PROCEDURE — 4040F PNEUMOC VAC/ADMIN/RCVD: CPT | Performed by: ORTHOPAEDIC SURGERY

## 2021-12-02 PROCEDURE — 1036F TOBACCO NON-USER: CPT | Performed by: ORTHOPAEDIC SURGERY

## 2021-12-02 PROCEDURE — G8427 DOCREV CUR MEDS BY ELIG CLIN: HCPCS | Performed by: ORTHOPAEDIC SURGERY

## 2021-12-02 PROCEDURE — 1090F PRES/ABSN URINE INCON ASSESS: CPT | Performed by: ORTHOPAEDIC SURGERY

## 2021-12-02 NOTE — LETTER
Physical Therapy Rehabilitation Referral    Patient Name:  Darline Call      YOB: 1945    Diagnosis:    1. Rupture of left long head biceps tendon, subsequent encounter    2. Arthritis of left shoulder region    3. Acute pain of left shoulder        Precautions:     [x] Evaluate and Treat    Post Op Instructions:  [] Continuous passive motion (CPM) [] Elbow ROM  [x] Exercise in plane of scapula  []  Strengthening     [] Pulley and instruction   [x] Home exercise program (copy to patient)   [] Sling when arm at risk  [] Sling or brace at all times   [x] AAROM: Forward elevation to            [x] AAROM: External rotation      [] Isometric external rotator strengthening [] AAROM: internal rotation: up the back  [x] Isometric abductor strengthening  [] AAROM: Internal abduction   [] Isometric internal rotator strengthening [] AAROM: cross-body adduction             Stretching:     Strengthening:  [x] Four quadrant (FE, ER, IR, CBA)  [x] Rotator cuff (ER, IR, Abd)  [] Forward Elevation    [] External Rotators     [] External Rotation    [] Internal Rotators  [] Internal Rotation: up/back   [] Abductors     [] Internal Rotation: supine in abduction  [] Sleeper Stretch    [] Flexors  [] Cross-body abduction    [] Extensors  [] Pendulum (FE, Abd/Add, cw/ccw)  [x] Scapular Stabilizers   [] Wall-walking (FE, Abd)        [x] Shoulder shrugs     [] Table slides (FE)                [x] Rhomboid pinch  [] Elbow (flex, ext, pron, sup)        [] Lat.  Pull downs     [] Medial epicondylitis program       [] Forward punch   [] Lateral epicondylitis program       [] Internal rotators     [] Progressive resistive exercises  [] Bench Press        [] Bench press plus  Activities:     [] Lateral pull-downs  [x] Rowing     [] Progressive two-hand supine press  [] Stepper/Exercise bike   [] Biceps: curls/supination  [] Swimming  [] Water exercises    Modalities:     Return to Sport:  [x] Of Choice      [] Plyometrics  [] Ultrasound     [] Rhythmic stabilization  [] Iontophoresis    [] Core strengthening   [] Moist heat     [] Sports specific program:   [] Massage         [x] Cryotherapy      [] Electrical stimulation     [] Paraffin  [] Whirlpool  [] TENS    [x] Home exercise program (copy to patient).         Perform exercises for:   15     minutes    3      times/day  [x] Supervised physical therapy  Frequency: []  1x week  [x] 2x week  [] 3x week  [] Other:   Duration: [] 2 weeks   [] 4 weeks  [x] 6 weeks  [] Other:     Additional Instructions:   Left shoulder range of motion  Periscapular and rotator cuff muscle strengthening  Gentle stretching exercises    Tiffanie Reyes MD   Clinical Fellow [facility]

## 2021-12-02 NOTE — PROGRESS NOTES
Chief Complaint    Shoulder Pain (NP LEFT SHOULDER REFERRED BY DR Adolfo Bertrand)      History of Present Illness:  Yvonne Carcamo is a pleasant,  68 y.o. female here today for evaluation of left shoulder. She has experienced left shoulder pain chronically but the pain has intensified over the past several months. Her pain is sharp in quality, from 4-7 over 10 in severity and mostly with reaching behind her back or doing overhead activities. He has received a steroid injection by Dr. Su Rodriguez that relieved her shoulder pain for about 2 months. She has sustained a left shoulder proximal biceps tendon tear a few weeks ago, but her shoulder pain is there even before that have happened . Patient has been referred to our clinic by Dr. Su Rodriguez for a second opinion about her left shoulder pain. Otherwise, she denies numbness, paresthesia, neck injury or any other neurological symptoms. Medical History:    No notes on file    Patient's medications, allergies, past medical, surgical, social and family histories were reviewed and updated as appropriate. Past Medical History:   Diagnosis Date    Arthritis     Bronchial asthma     GERD (gastroesophageal reflux disease)     Hyperlipidemia     Hypertension     Mild tricuspid regurgitation 05/26/2016    Moderate mitral regurgitation 05/26/2016    By echo 1/2016.   Needs yrly echo    Osteopenia     Prolonged emergence from general anesthesia     RLS (restless legs syndrome)     Seasonal allergies       Social History     Socioeconomic History    Marital status:      Spouse name: Not on file    Number of children: Not on file    Years of education: Not on file    Highest education level: Not on file   Occupational History    Not on file   Tobacco Use    Smoking status: Never Smoker    Smokeless tobacco: Never Used   Vaping Use    Vaping Use: Never used   Substance and Sexual Activity    Alcohol use: No    Drug use: No    Sexual activity: Yes     Partners: (SYNALAR) 0.01 % external solution       SPIRIVA RESPIMAT 1.25 MCG/ACT AERS inhaler       amLODIPine (NORVASC) 5 MG tablet Take 1 tablet by mouth daily 90 tablet 3    simvastatin (ZOCOR) 40 MG tablet TAKE ONE TABLET BY MOUTH EVERY EVENING 90 tablet 3    FASENRA 30 MG/ML SOSY injection       ketoconazole (NIZORAL) 2 % cream       mometasone (ELOCON) 0.1 % ointment       lansoprazole (PREVACID) 30 MG delayed release capsule TAKE 1 CAPSULE EVERY DAY 90 capsule 3    montelukast (SINGULAIR) 10 MG tablet TAKE 1 TABLET EVERY NIGHT 90 tablet 3    Baclofen (LIORESAL) 5 MG tablet TAKE ONE TABLET BY MOUTH TWICE A DAY AS NEEDED FOR PAIN 30 tablet 4    ibuprofen (ADVIL;MOTRIN) 600 MG tablet TAKE ONE TABLET BY MOUTH EVERY 6 HOURS AS NEEDED FOR PAIN 120 tablet 2    beclomethasone (BECONASE-AQ) 80 MCG/ACT AERS nasal spray 2 sprays by Each Nostril route 2 times daily      vitamin C (ASCORBIC ACID) 500 MG tablet Take 500 mg by mouth daily      Omega-3 Fatty Acids (OMEGA-3 FISH OIL) 300 MG CAPS Take 1 capsule by mouth daily      Vitamin A 2400 MCG (8000 UT) CAPS Take 1 capsule by mouth daily      fexofenadine (ALLEGRA ALLERGY) 180 MG tablet Take 180 mg by mouth daily      desoximetasone (TOPICORT) 0.25 % cream Apply topically 2 times daily. 30 g 0    mupirocin (BACTROBAN) 2 % ointment Apply topically 3 times daily.  Tiotropium Bromide Monohydrate (SPIRIVA RESPIMAT IN) Inhale 2 puffs into the lungs daily       ALVESCO 160 MCG/ACT AERS daily       azelastine (ASTELIN) 137 MCG/SPRAY nasal spray 2 sprays by Nasal route daily. Use in each nostril as directed 3 Bottle 3    albuterol (PROVENTIL HFA;VENTOLIN HFA) 108 (90 BASE) MCG/ACT inhaler Inhale 2 puffs into the lungs. AS NEEDED.  Multiple Vitamins-Minerals (CENTRUM SILVER) TABS Take  by mouth.  Misc Natural Products (OSTEO BI-FLEX ADV TRIPLE ST) TABS Take 2 tablets by mouth daily.         Calcium Carbonate-Vit D-Min (CALTRATE 600+D PLUS) 600-400 MG-UNIT TABS Take 2 tablets by mouth daily. No current facility-administered medications on file prior to visit. Review of Systems  A 14 point review of systems was completed by the patient on 12/2/2021 and is available in the media section of the scanned medical record and was reviewed on 12/2/2021. The review is negative with the exception of those things mentioned in the HPI and Past Medical History    Vital Signs: There were no vitals filed for this visit. General Exam:   Neurological: The patient has good coordination. There is no weakness or sensory deficit. LEFT  Shoulder Examination:    Inspection:  No skin lesions, no deformity, no swelling. Palpation:  Tenderness at the bicep kim groove. No tenderness at the [facility] joint. Tenderness at the cuff muscle footprint. Active Range of Motion: Forward Elevation 150 , Abduction 140, External Rotation 60, Internal Rotation L1    Passive Range of Motion: Forward Elevation 160, Abduction 140, External Rotation 60, Internal Rotation L1    Strength:  External Rotation 4+/5, Internal Rotation 4+/5, Champagne Toast 4/5, Supraspinatus 4/5    Special Tests:  Positive Daron's, Positive Hawkin's, No Martin deformity. Neurovascular: Palpable radial pulse, normal sensation in the median, ulnar, radial nerve distributions    Radiology:     No new radiography taken at the office today   Left shoulder xray from Aug 2021 showed mild- moderate glenohumeral osteoarthritis   Left shoulder MRI :  CONCLUSION:   1. Rupture of the long head biceps tendon with the free end retracted distally beyond the edge    of the field of view. 2. Near full-thickness tear of the supraspinatus at the junction of the critical zone and    insertion.  The tear measures 3mm x 3mm. 3. Anatomic variation with an accessory tendon of the pectoralis minor inserting upon the    glenohumeral capsule and supraspinatus tendon. 4. Anatomic variation with a thick rotator cable. patient. Milind Lopez will follow up in 4-6 weeks and/or as needed. They were in agreement with this plan and all questions were answered to the patient's satisfaction. They were encouraged to call with any questions. After discussing the risks and benefits of a corticosteroid injection, Yvonne did state an understanding and gave verbal consent to proceed. After cleansing the injection site with Chlora-prep and using aseptic techniques,  2 CC of Depo Medrol 40mg/ml and 8 CC of 1% lidocaine were injected in the left shoulder glenohumeral joint. She  tolerated the procedure well with no immediate adverse sequelae after the injection. A bandage was placed over the injection site. Appropriate post injections instructions were given to the patient. Izabel Christensen MD  Clinical Fellow at [place] [id number]    5:18 PM  12/2/2021      During this examination, uSnday Kessler MD functioned as a scribe for Dr. Clinton Shelton. This dictation was performed with a verbal recognition program (DRAGON) and it was checked for errors. It is possible that there are still dictated errors within this office note. If so, please bring any errors to my attention for an addendum. All efforts were made to ensure that this office note is accurate.  ___________________  I was physically present and personally supervised the Orthopaedic Sports Medicine Fellow in the evaluation and development of a treatment plan for this patient. I personally interviewed the patient and performed a physical examination. In addition, I discussed the patient's condition and treatment options with them. I have also reviewed and agree with the past medical, family and social history unless otherwise noted. All of the patient's questions were answered. Kamille Soares MD, PhD  12/2/2021

## 2021-12-06 RX ORDER — METHYLPREDNISOLONE ACETATE 40 MG/ML
80 INJECTION, SUSPENSION INTRA-ARTICULAR; INTRALESIONAL; INTRAMUSCULAR; SOFT TISSUE ONCE
Status: COMPLETED | OUTPATIENT
Start: 2021-12-06 | End: 2021-12-06

## 2021-12-06 RX ORDER — LIDOCAINE HYDROCHLORIDE 10 MG/ML
8 INJECTION, SOLUTION INFILTRATION; PERINEURAL ONCE
Status: COMPLETED | OUTPATIENT
Start: 2021-12-06 | End: 2021-12-06

## 2021-12-06 RX ADMIN — METHYLPREDNISOLONE ACETATE 80 MG: 40 INJECTION, SUSPENSION INTRA-ARTICULAR; INTRALESIONAL; INTRAMUSCULAR; SOFT TISSUE at 09:53

## 2021-12-06 RX ADMIN — LIDOCAINE HYDROCHLORIDE 8 ML: 10 INJECTION, SOLUTION INFILTRATION; PERINEURAL at 09:54

## 2021-12-13 ENCOUNTER — HOSPITAL ENCOUNTER (OUTPATIENT)
Dept: PHYSICAL THERAPY | Age: 76
Setting detail: THERAPIES SERIES
Discharge: HOME | End: 2021-12-13
Payer: MEDICARE

## 2021-12-13 PROCEDURE — 97112 NEUROMUSCULAR REEDUCATION: CPT | Performed by: PHYSICAL THERAPIST

## 2021-12-13 PROCEDURE — 97161 PT EVAL LOW COMPLEX 20 MIN: CPT | Performed by: PHYSICAL THERAPIST

## 2021-12-13 PROCEDURE — 97110 THERAPEUTIC EXERCISES: CPT | Performed by: PHYSICAL THERAPIST

## 2021-12-13 PROCEDURE — 97140 MANUAL THERAPY 1/> REGIONS: CPT | Performed by: PHYSICAL THERAPIST

## 2021-12-13 NOTE — PLAN OF CARE
ordered an MRI and then was referred to a shoulder specialist. She states she saw the MD, who told her she was not ready for a replacement and referred her back to PT. She follows up with the MD in January 2022    CLOF:  She states she did receive her booster shot, which really increased her pain. She states after two days of taking it easy, she had no pain. She states she is taking pain medication to help with the pain. She states occasionally she can't sleep on the L shoulder. She states she has to have the head of the bad raised a little for asthma, which increases shoulder pain. She states the pain also can effect her neck, but cold weather effects it more. She states reaching behind her back, lifting, and carrying increases L shoulder pain. No problems driving. She states doing her hair increases pain. She states she has kept up with some of her exercises, stating she likes the isometrics.        Relevant Medical History:high blood pressure (on medications), OA, asthma (intake from 8/9 - pt reported no changes)   Functional Disability Index:  UEFI  12/13 - 24% limitation      Pain Scale: 0-1/10 today, 8/10 at worst  Easing factors: medication  Provocative factors: listed above     Type: []Constant   [x]Intermittent  []Radiating []Localized []other:    Numbness/Tingling: none     Occupation/School:  Retired     Hobbies: knit    Living Status/Prior Level of Function: Independent with ADLs and IADLs    OBJECTIVE:     ROM AROM  Comment    L R    Flexion 161 + pain  169    Abduction 141 + pain  155    ER 76 109    IR 42 43    Other(cervical)          Strength L R Comment   Flexion 4-/5 4/5    Abduction 4/5 4/5    ER 4-/5 4/5    IR 4+/5 4/5          Upper Trap      Lower Trap      Mid Trap 4-/5 + pain 4-/5    Rhomboids 4/5 4/5    Biceps 4/5 4+/5    Triceps 4/5 4+/5      Special Tests Results/Comment   Pickett-Alex Pos   Neers Pos   Speeds Neg   OBriens Neg   Other      Reflexes/Sensation:    [x]Dermatomes/Myotomes experience  []other:  Justification: Pt's co-morbidities and history of L shoulder pain, which has returned, which may affect rehab potential.     Falls Risk Assessment (30 days):   [x] Falls Risk assessed and no intervention required. [] Falls Risk assessed and Patient requires intervention due to being higher risk   TUG score (>12s at risk):     [] Falls education provided, including       ASSESSMENT:   Functional Impairments   [x]Noted spinal or UE joint hypomobility   []Noted spinal or UE joint hypermobility   [x]Decreased UE functional ROM   [x]Decreased UE functional strength   []Abnormal reflexes/sensation/myotomal/dermatomal deficits   [x]Decreased RC/scapular/core strength and neuromuscular control   []other:      Functional Activity Limitations (from functional questionnaire and intake)   [x]Reduced ability to tolerate prolonged functional positions   [x]Reduced ability or difficulty with changes of positions or transfers between positions   []Reduced ability to maintain good posture and demonstrate good body mechanics with sitting, bending, and lifting   [] Reduced ability or tolerance with driving and/or computer work   []Reduced ability to sleep   [x]Reduced ability to perform lifting, reaching, carrying tasks   [x]Reduced ability to tolerate impact through UE   [x]Reduced ability to reach behind back   []Reduced ability to  or hold objects   []Reduced ability to throw or toss an object   []other:      Participation Restrictions   [x]Reduced participation in self care activities   [x]Reduced participation in home management activities   []Reduced participation in work activities   []Reduced participation in social activities. []Reduced participation in sport / recreational activities. Classification:   []Signs/symptoms consistent with post-surgical status including decreased ROM, strength and function.   []Signs/symptoms consistent with joint sprain/strain   []Signs/symptoms consistent with shoulder impingement   []Signs/symptoms consistent with shoulder/elbow/wrist tendinopathy   [x]Signs/symptoms consistent with Rotator cuff tear   []Signs/symptoms consistent with labral tear   []Signs/symptoms consistent with postural dysfunction    []Signs/symptoms consistent with Glenohumeral IR Deficit - <45 degrees   []Signs/symptoms consistent with facet dysfunction of cervical/thoracic spine    []Signs/symptoms consistent with pathology which may benefit from Dry needling     []other:     Prognosis/Rehab Potential:      []Excellent   [x]Good    []Fair   []Poor    Tolerance of evaluation/treatment:    []Excellent   [x]Good    []Fair   []Poor    Physical Therapy Evaluation Complexity Justification  [x] A history of present problem with:  [] no personal factors and/or comorbidities that impact the plan of care;  [x]1-2 personal factors and/or comorbidities that impact the plan of care  []3 personal factors and/or comorbidities that impact the plan of care  [x] An examination of body systems using standardized tests and measures addressing any of the following: body structures and functions (impairments), activity limitations, and/or participation restrictions;:  [] a total of 1-2 or more elements   [] a total of 3 or more elements   [x] a total of 4 or more elements   [x] A clinical presentation with:  [x] stable and/or uncomplicated characteristics   [] evolving clinical presentation with changing characteristics  [] unstable and unpredictable characteristics;   [x] Clinical decision making of [x] low, [] moderate, [] high complexity using standardized patient assessment instrument and/or measurable assessment of functional outcome.     [] EVAL (LOW) 88978 (typically 20 minutes face-to-face)  [] EVAL (MOD) 05820 (typically 30 minutes face-to-face)  [] EVAL (HIGH) 56363 (typically 45 minutes face-to-face)  [] RE-EVAL     Reviewed insurance benefits for physical therapy in an outpatient hospital based setting with the patient, including deductible and allowable visit number. PLAN:  Frequency/Duration:  1-2 days per week for 4-6 Weeks:  INTERVENTIONS:  [x] Therapeutic exercise including: strength training, ROM, for Upper extremity and core   [x]  NMR activation and proprioception for UE, scap and Core   [x] Manual therapy as indicated for shoulder, scapula and spine to include: Dry Needling/IASTM, STM, PROM, Gr I-IV mobilizations, manipulation. [x] Modalities as needed that may include: thermal agents, E-stim, Biofeedback, US, iontophoresis as indicated  [x] Patient education on joint protection, postural re-education, activity modification, progression of HEP. HEP instruction: (see scanned forms)    GOALS:  Patient stated goal: learn to control the pain    [] Progressing: [] Met: [] Not Met: [] Adjusted    Therapist goals for Patient:   Short Term Goals: To be achieved in: 2 weeks  1. Independent in HEP and progression per patient tolerance, in order to prevent re-injury. [] Progressing: [] Met: [] Not Met: [] Adjusted   2. Patient will have a decrease in pain to facilitate improvement in movement, function, and ADLs as indicated by Functional Deficits. [] Progressing: [] Met: [] Not Met: [] Adjusted    Long Term Goals: To be achieved in: 4-6 weeks  1. Disability index score of 12% or less for the UEFI to assist with reaching prior level of function. [] Progressing: [] Met: [] Not Met: [] Adjusted  2. Patient will demonstrate increased left shoulder flex AROM to greater than or equal to 165 deg, ABD AROM to greater than or equal to 155 deg, ER AROM to greater than or equal to 90 deg, and IR AROM to greater than or equal to 50 deg to allow for proper joint functioning as indicated by patients Functional Deficits. [] Progressing: [] Met: [] Not Met: [] Adjusted  3.  Patient will demonstrate an increase in left shoulder (flex, ABD, IR, and ER) strength to 4+/5 to allow for proper functional mobility as indicated by patients Functional Deficits. [] Progressing: [] Met: [] Not Met: [] Adjusted  4. Patient will return to ADLs without increased symptoms or restriction. [] Progressing: [] Met: [] Not Met: [] Adjusted  5. Patient will report carrying laundry to the basement pain free.    [] Progressing: [] Met: [] Not Met: [] Adjusted     Electronically signed by:  Mckenna Peraza PT, DPT

## 2021-12-13 NOTE — FLOWSHEET NOTE
[address] [facility],[address], [address] [facility], [address]  Phone: [phone]   Fax: [phone]    Physical Therapy Treatment Note/ Progress Report:         Date:  2021    Patient Name:  Mary Lou Del Toro    :  1945  MRN: [id number]  Restrictions/Precautions:    Medical/Treatment Diagnosis Information:  Diagnosis: Rupture of left long head biceps tendon - J69.488, Arthritis of left shoulder region - M19.012  Treatment Diagnosis: decreased ROM, strength, and ADL status  Insurance/Certification information:  PT Insurance Information: [facility]  Physician Information:  Referring Practitioner: Dr. Saundra Carlos  Has the plan of care been signed (Y/N):        []  Yes  [x]  No     Date of Patient follow up with Physician: 2021      Is this a Progress Report:     []  Yes  [x]  No        Progress report/ Recertification will be due (10 Rx or 30 days whichever is less): 10 Hector 2022         Visit # Insurance Allowable Auth Required   1 BMN []  Yes []  No        Functional Scale:   UEFI   - 24% limitation       Latex Allergy:  [x]NO      []YES  Preferred Language for Healthcare:   [x]English       []other:      Pain level:  0-1/10      SUBJECTIVE:  See eval     OBJECTIVE: See eval    Observation:    Test measurements:      RESTRICTIONS/PRECAUTIONS: none    Exercises/Interventions:   Exercise/Equipment Resistance/Repetitions Other comments   Stretching/PROM     Wand     IR cane 10 sec x 10  Added    ER cane 10 sec x 10  Added    Pulleys 10 sec x 10 flex and ABD Added    Pendulum          Isometrics     Retraction          Weight shift     Flexion  On HEP    Abduction  On HEP    External Rotation  On HEP    Internal Rotation  On HEP    Biceps     Triceps          PRE's     Flexion     Abduction     External Rotation     Internal Rotation     Shrugs     EXT     Reverse Flys     Serratus     Horizontal Abd with ER Biceps     Triceps     Retraction          Cable Column/Theraband     External Rotation     Internal Rotation     Shrugs     Lats     Ext     Flex     Scapular Retraction     BIC     TRIC     PNF          Dynamic Stability          Plyoback          Manual interventions     L shoulder stretching 3'  Added 12/13   Cervical suboccipital release with rib 1 mobiliztaions 5' grade III-IV Added 12/13   Thoracic mobilizations - PA 2' grade III-IV Added 12/13       Therapeutic Exercise and NMR EXR  [x] (15245) Provided verbal/tactile cueing for activities related to strengthening, flexibility, endurance, ROM  for improvements in scapular, scapulothoracic and UE control with self care, reaching, carrying, lifting, house/yardwork, driving/computer work. [x] (73583) Provided verbal/tactile cueing for activities related to improving balance, coordination, kinesthetic sense, posture, motor skill, proprioception  to assist with  scapular, scapulothoracic and UE control with self care, reaching, carrying, lifting, house/yardwork, driving/computer work. Therapeutic Activities:    [x] (13000 or 89205) Provided verbal/tactile cueing for activities related to improving balance, coordination, kinesthetic sense, posture, motor skill, proprioception and motor activation to allow for proper function of scapular, scapulothoracic and UE control with self care, carrying, lifting, driving/computer work.      Home Exercise Program:    [x] (38068) Reviewed/Progressed HEP activities related to strengthening, flexibility, endurance, ROM of scapular, scapulothoracic and UE control with self care, reaching, carrying, lifting, house/yardwork, driving/computer work  [] (30897) Reviewed/Progressed HEP activities related to improving balance, coordination, kinesthetic sense, posture, motor skill, proprioception of scapular, scapulothoracic and UE control with self care, reaching, carrying, lifting, house/yardwork, driving/computer work Manual Treatments:  PROM / STM / Oscillations-Mobs:  G-I, II, III, IV (PA's, Inf., Post.)  [x] (25021) Provided manual therapy to mobilize soft tissue/joints of cervical/CT, scapular GHJ and UE for the purpose of modulating pain, promoting relaxation,  increasing ROM, reducing/eliminating soft tissue swelling/inflammation/restriction, improving soft tissue extensibility and allowing for proper ROM for normal function with self care, reaching, carrying, lifting, house/yardwork, driving/computer work    Modalities:     [] GAME READY (VASO)- for significant edema, swelling, pain control. Charges:  Timed Code Treatment Minutes: 25'    Total Treatment Minutes: 48'       [x] EVAL (LOW) 50431 (typically 20 minutes face-to-face)  [] EVAL (MOD) 96776 (typically 30 minutes face-to-face)  [] EVAL (HIGH) 96206 (typically 45 minutes face-to-face)  [] RE-EVAL     [x] GG(98223) x 1    [] IONTO  [] NMR (11163) x     [] VASO  [x] Manual (71871) x 1   [] Other:  [] TA x      [] Mech Traction (93274)  [] ES(attended) (40528)      [] ES (un) (46148):         ASSESSMENT:  See eval 12/13      GOALS:  Patient stated goal: learn to control the pain    []? Progressing: []? Met: []? Not Met: []? Adjusted     Therapist goals for Patient:   Short Term Goals: To be achieved in: 2 weeks  1. Independent in HEP and progression per patient tolerance, in order to prevent re-injury. []? Progressing: []? Met: []? Not Met: []? Adjusted   2. Patient will have a decrease in pain to facilitate improvement in movement, function, and ADLs as indicated by Functional Deficits. []? Progressing: []? Met: []? Not Met: []? Adjusted     Long Term Goals: To be achieved in: 4-6 weeks  1. Disability index score of 12% or less for the UEFI to assist with reaching prior level of function. []? Progressing: []? Met: []? Not Met: []? Adjusted  2.  Patient will demonstrate increased left shoulder flex AROM to greater than or equal to 165 deg, ABD AROM to greater than or equal to 155 deg, ER AROM to greater than or equal to 90 deg, and IR AROM to greater than or equal to 50 deg to allow for proper joint functioning as indicated by patients Functional Deficits. []? Progressing: []? Met: []? Not Met: []? Adjusted  3. Patient will demonstrate an increase in left shoulder (flex, ABD, IR, and ER) strength to 4+/5 to allow for proper functional mobility as indicated by patients Functional Deficits. []? Progressing: []? Met: []? Not Met: []? Adjusted  4. Patient will return to ADLs without increased symptoms or restriction. []? Progressing: []? Met: []? Not Met: []? Adjusted  5. Patient will report carrying laundry to the basement pain free. []? Progressing: []? Met: []? Not Met: []? Adjusted       Overall Progression Towards Functional goals/ Treatment Progress Update:  [] Patient is progressing as expected towards functional goals listed. [] Progression is slowed due to complexities/Impairments listed. [] Progression has been slowed due to co-morbidities. [x] Plan just implemented, too soon to assess goals progression <30days   [] Goals require adjustment due to lack of progress  [] Patient is not progressing as expected and requires additional follow up with physician  [] Other    Prognosis for POC: [x] Good [] Fair  [] Poor      Patient requires continued skilled intervention: [x] Yes  [] No      Treatment/Activity Tolerance:  [] Patient tolerated treatment well [] Patient limited by fatique  [] Patient limited by pain  [] Patient limited by other medical complications  [] Other:     Patient education: Patient education on PT and plan of care including diagnosis, prognosis, treatment goals and options. Patient agrees with discussed POC and treatment and is aware of rehab process.  12/13    PLAN: See eval 12/13  [] Continue per plan of care [] Alter current plan (see comments above)  [x] Plan of care initiated [] Hold pending MD visit [] Discharge      Electronically signed by:  Izzy Mane PT, DPT     Note: If patient does not return for scheduled/ recommended follow up visits, this note will serve as a discharge from care along with most recent update on progress.

## 2021-12-15 ENCOUNTER — CLINICAL DOCUMENTATION (OUTPATIENT)
Dept: OTHER | Age: 76
End: 2021-12-15

## 2021-12-20 ENCOUNTER — HOSPITAL ENCOUNTER (OUTPATIENT)
Dept: PHYSICAL THERAPY | Age: 76
Setting detail: THERAPIES SERIES
Discharge: HOME | End: 2021-12-20
Payer: MEDICARE

## 2021-12-20 PROCEDURE — 97112 NEUROMUSCULAR REEDUCATION: CPT

## 2021-12-20 PROCEDURE — 97110 THERAPEUTIC EXERCISES: CPT

## 2021-12-20 PROCEDURE — 97140 MANUAL THERAPY 1/> REGIONS: CPT

## 2021-12-20 NOTE — FLOWSHEET NOTE
[facility], [address] [place], [address]  Phone: [phone]   Fax: [phone]    Physical Therapy Treatment Note/ Progress Report:         Date:  2021    Patient Name:  Milind Lopez    :  1945  MRN: [id number]  Restrictions/Precautions:    Medical/Treatment Diagnosis Information:  Diagnosis: Rupture of left long head biceps tendon - L33.500, Arthritis of left shoulder region - M19.012  Treatment Diagnosis: decreased ROM, strength, and ADL status  Insurance/Certification information:  PT Insurance Information: [facility]  Physician Information:  Referring Practitioner: Dr. Korina Soares  Has the plan of care been signed (Y/N):        []  Yes  [x]  No     Date of Patient follow up with Physician: 2021      Is this a Progress Report:     []  Yes  [x]  No        Progress report/ Recertification will be due (10 Rx or 30 days whichever is less): 10 Hector 2022         Visit # Insurance Allowable Auth Required   2 BMN []  Yes []  No        Functional Scale:   UEFI   - 24% limitation       Latex Allergy:  [x]NO      []YES  Preferred Language for Healthcare:   [x]English       []other:      Pain level:  2-3/10      SUBJECTIVE:  Patient states she has no more pain currently than she typically has. She states she completed HEP 50% of the time, with no issues.  Patient states she did use a \"patch\" to decrease her pain over the weekend, which did help.      OBJECTIVE: See russell    Observation:    Test measurements:      RESTRICTIONS/PRECAUTIONS: none    Exercises/Interventions:   Exercise/Equipment Resistance/Repetitions Other comments   Stretching/PROM     Wand     IR cane 10 sec x 10     ER cane 10 sec x 10      Pulleys 10 sec x 10 flex and ABD    Pendulum     Wall slide 10x10\" flexion and scaption  Added              Isometrics     Retraction          Weight shift     Flexion  On HEP    Abduction  On HEP External Rotation  On HEP    Internal Rotation  On HEP    Biceps     Triceps          PRE's     Flexion     Abduction     External Rotation     Internal Rotation     Shrugs     EXT     Reverse Flys     Serratus     Horizontal Abd with ER     Biceps 2# 3x10 Added 12/20   Triceps     Retraction          Cable Column/Theraband     External Rotation     Internal Rotation     Shrugs     Lats     Ext Red 2x10 Added 12/20   Flex     Scapular Retraction Red 2x10 Added 12/20    BIC     TRIC     PNF          Dynamic Stability          Plyoback          Manual interventions     Infraspinatus/supraspinatus/upper trap STM 3' Added 12/20   L shoulder stretching 3'     Cervical suboccipital release with rib 1 mobiliztaions     Thoracic mobilizations - PA 2' grade III-IV        Therapeutic Exercise and NMR EXR  [x] (79416) Provided verbal/tactile cueing for activities related to strengthening, flexibility, endurance, ROM  for improvements in scapular, scapulothoracic and UE control with self care, reaching, carrying, lifting, house/yardwork, driving/computer work. [x] (00400) Provided verbal/tactile cueing for activities related to improving balance, coordination, kinesthetic sense, posture, motor skill, proprioception  to assist with  scapular, scapulothoracic and UE control with self care, reaching, carrying, lifting, house/yardwork, driving/computer work. Therapeutic Activities:    [x] (70831 or 72911) Provided verbal/tactile cueing for activities related to improving balance, coordination, kinesthetic sense, posture, motor skill, proprioception and motor activation to allow for proper function of scapular, scapulothoracic and UE control with self care, carrying, lifting, driving/computer work.      Home Exercise Program:    [x] (91748) Reviewed/Progressed HEP activities related to strengthening, flexibility, endurance, ROM of scapular, scapulothoracic and UE control with self care, reaching, carrying, lifting, house/yardwork, driving/computer work  [] (30210) Reviewed/Progressed HEP activities related to improving balance, coordination, kinesthetic sense, posture, motor skill, proprioception of scapular, scapulothoracic and UE control with self care, reaching, carrying, lifting, house/yardwork, driving/computer work      Manual Treatments:  PROM / STM / Oscillations-Mobs:  G-I, II, III, IV (PA's, Inf., Post.)  [x] (46244) Provided manual therapy to mobilize soft tissue/joints of cervical/CT, scapular GHJ and UE for the purpose of modulating pain, promoting relaxation,  increasing ROM, reducing/eliminating soft tissue swelling/inflammation/restriction, improving soft tissue extensibility and allowing for proper ROM for normal function with self care, reaching, carrying, lifting, house/yardwork, driving/computer work    Modalities:     [] GAME READY (VASO)- for significant edema, swelling, pain control. Charges:  Timed Code Treatment Minutes: 42'    Total Treatment Minutes: 42'    9136-8614  [] EVAL (LOW) 33756 (typically 20 minutes face-to-face)  [] EVAL (MOD) 38797 (typically 30 minutes face-to-face)  [] EVAL (HIGH) 64681 (typically 45 minutes face-to-face)  [] RE-EVAL     [x] IO(49928) x 1    [] IONTO  [x] NMR (54041) x   1  [] VASO  [x] Manual (35390) x 1   [] Other:  [] TA x      [] Mech Traction (12938)  [] ES(attended) (51840)      [] ES (un) (61551):         ASSESSMENT:  See eval 12/13      GOALS:  Patient stated goal: learn to control the pain    []? Progressing: []? Met: []? Not Met: []? Adjusted     Therapist goals for Patient:   Short Term Goals: To be achieved in: 2 weeks  1. Independent in HEP and progression per patient tolerance, in order to prevent re-injury. []? Progressing: []? Met: []? Not Met: []? Adjusted   2. Patient will have a decrease in pain to facilitate improvement in movement, function, and ADLs as indicated by Functional Deficits. []? Progressing: []? Met: []?  Not Met: []? Adjusted     Long Term Goals: To be achieved in: 4-6 weeks  1. Disability index score of 12% or less for the UEFI to assist with reaching prior level of function. []? Progressing: []? Met: []? Not Met: []? Adjusted  2. Patient will demonstrate increased left shoulder flex AROM to greater than or equal to 165 deg, ABD AROM to greater than or equal to 155 deg, ER AROM to greater than or equal to 90 deg, and IR AROM to greater than or equal to 50 deg to allow for proper joint functioning as indicated by patients Functional Deficits. []? Progressing: []? Met: []? Not Met: []? Adjusted  3. Patient will demonstrate an increase in left shoulder (flex, ABD, IR, and ER) strength to 4+/5 to allow for proper functional mobility as indicated by patients Functional Deficits. []? Progressing: []? Met: []? Not Met: []? Adjusted  4. Patient will return to ADLs without increased symptoms or restriction. []? Progressing: []? Met: []? Not Met: []? Adjusted  5. Patient will report carrying laundry to the basement pain free. []? Progressing: []? Met: []? Not Met: []? Adjusted       Overall Progression Towards Functional goals/ Treatment Progress Update:  [] Patient is progressing as expected towards functional goals listed. [] Progression is slowed due to complexities/Impairments listed. [] Progression has been slowed due to co-morbidities.   [x] Plan just implemented, too soon to assess goals progression <30days   [] Goals require adjustment due to lack of progress  [] Patient is not progressing as expected and requires additional follow up with physician  [] Other    Prognosis for POC: [x] Good [] Fair  [] Poor      Patient requires continued skilled intervention: [x] Yes  [] No      Treatment/Activity Tolerance:  [] Patient tolerated treatment well [] Patient limited by fatique  [] Patient limited by pain  [] Patient limited by other medical complications  [] Other: Patient demonstrated stretching component of HEP well with minimal verbal cueing needed. Patient tolerated progressions well without increase in pain. Manual therapy targeted soft tissue restrictions, which patient reported relief from. Progress patient with general RTC strengthening in order to function to PLOF. Patient education: Patient education on PT and plan of care including diagnosis, prognosis, treatment goals and options. Patient agrees with discussed POC and treatment and is aware of rehab process. 12/13    PLAN: See eval 12/13  [] Continue per plan of care [] Alter current plan (see comments above)  [x] Plan of care initiated [] Hold pending MD visit [] Discharge      Electronically signed by:  Don Gay, PT, DPT         Note: If patient does not return for scheduled/ recommended follow up visits, this note will serve as a discharge from care along with most recent update on progress.

## 2021-12-27 ENCOUNTER — HOSPITAL ENCOUNTER (OUTPATIENT)
Dept: PHYSICAL THERAPY | Age: 76
Setting detail: THERAPIES SERIES
Discharge: HOME | End: 2021-12-27
Payer: MEDICARE

## 2021-12-27 PROCEDURE — 97112 NEUROMUSCULAR REEDUCATION: CPT | Performed by: PHYSICAL THERAPIST

## 2021-12-27 PROCEDURE — 97140 MANUAL THERAPY 1/> REGIONS: CPT | Performed by: PHYSICAL THERAPIST

## 2021-12-27 PROCEDURE — 97110 THERAPEUTIC EXERCISES: CPT | Performed by: PHYSICAL THERAPIST

## 2021-12-27 NOTE — FLOWSHEET NOTE
[address] [facility],[address], [address] [facility], [address]  Phone: [phone]   Fax: [phone]    Physical Therapy Treatment Note/ Progress Report:         Date:  2021    Patient Name:  Alissa Kanner    :  1945  MRN: [id number]  Restrictions/Precautions:    Medical/Treatment Diagnosis Information:  Diagnosis: Rupture of left long head biceps tendon - G62.622, Arthritis of left shoulder region - M19.012  Treatment Diagnosis: decreased ROM, strength, and ADL status  Insurance/Certification information:  PT Insurance Information: [facility]  Physician Information:  Referring Practitioner: Dr. Wesley Larson  Has the plan of care been signed (Y/N):        []  Yes  [x]  No     Date of Patient follow up with Physician: 2021      Is this a Progress Report:     []  Yes  [x]  No        Progress report/ Recertification will be due (10 Rx or 30 days whichever is less): 10 Hector 2022         Visit # Insurance Allowable Auth Required   3 BMN []  Yes []  No        Functional Scale:   UEFI   - 24% limitation       Latex Allergy:  [x]NO      []YES  Preferred Language for Healthcare:   [x]English       []other:      Pain level:  2-3/10 12/27     SUBJECTIVE:  Patient states when she does all of the isometrics together, she has increased pain, so she has been breaking them up throughout the day.       OBJECTIVE: See russell    Observation:    Test measurements:      RESTRICTIONS/PRECAUTIONS: none    Exercises/Interventions:   Exercise/Equipment Resistance/Repetitions Other comments   Stretching/PROM     Wand     IR cane 10 sec x 10     ER cane 10 sec x 10      Pulleys 10 sec x 10 flex and ABD    Pendulum     Wall slide 10x10\" flexion and scaption  Added              Isometrics     Retraction          Weight shift     Flexion  On HEP    Abduction  On HEP    External Rotation  On HEP    Internal Rotation  On HEP    Biceps     Triceps PRE's     Flexion     Abduction     External Rotation     Internal Rotation     Shrugs     EXT     Reverse Flys     Serratus     Horizontal Abd with ER     Biceps 1# 3x10 Modified 12/27   Triceps - bent over 10 x 3  Added 12/27   Retraction          Cable Column/Theraband     External Rotation     Internal Rotation     Shrugs     Lats     Ext Red 2x10 Added 12/20   Flex     Scapular Retraction Red 2x10 Added 12/20    BIC     TRIC     PNF          Dynamic Stability     Supine shoulder flex ABCs 1x  Added 12/27        Plyoback          Manual interventions     Infraspinatus/supraspinatus/upper trap STM 4' 12/27   L shoulder stretching 4' 12/27   Cervical suboccipital release with rib 1 mobiliztaions     Thoracic mobilizations - PA         Therapeutic Exercise and NMR EXR  [x] (15049) Provided verbal/tactile cueing for activities related to strengthening, flexibility, endurance, ROM  for improvements in scapular, scapulothoracic and UE control with self care, reaching, carrying, lifting, house/yardwork, driving/computer work. [x] (46767) Provided verbal/tactile cueing for activities related to improving balance, coordination, kinesthetic sense, posture, motor skill, proprioception  to assist with  scapular, scapulothoracic and UE control with self care, reaching, carrying, lifting, house/yardwork, driving/computer work. Therapeutic Activities:    [x] (90823 or 07427) Provided verbal/tactile cueing for activities related to improving balance, coordination, kinesthetic sense, posture, motor skill, proprioception and motor activation to allow for proper function of scapular, scapulothoracic and UE control with self care, carrying, lifting, driving/computer work.      Home Exercise Program:    [x] (93996) Reviewed/Progressed HEP activities related to strengthening, flexibility, endurance, ROM of scapular, scapulothoracic and UE control with self care, reaching, carrying, lifting, house/yardwork, driving/computer Disability index score of 12% or less for the UEFI to assist with reaching prior level of function. []? Progressing: []? Met: []? Not Met: []? Adjusted  2. Patient will demonstrate increased left shoulder flex AROM to greater than or equal to 165 deg, ABD AROM to greater than or equal to 155 deg, ER AROM to greater than or equal to 90 deg, and IR AROM to greater than or equal to 50 deg to allow for proper joint functioning as indicated by patients Functional Deficits. []? Progressing: []? Met: []? Not Met: []? Adjusted  3. Patient will demonstrate an increase in left shoulder (flex, ABD, IR, and ER) strength to 4+/5 to allow for proper functional mobility as indicated by patients Functional Deficits. []? Progressing: []? Met: []? Not Met: []? Adjusted  4. Patient will return to ADLs without increased symptoms or restriction. []? Progressing: []? Met: []? Not Met: []? Adjusted  5. Patient will report carrying laundry to the basement pain free. []? Progressing: []? Met: []? Not Met: []? Adjusted       Overall Progression Towards Functional goals/ Treatment Progress Update:  [] Patient is progressing as expected towards functional goals listed. [] Progression is slowed due to complexities/Impairments listed. [] Progression has been slowed due to co-morbidities. [x] Plan just implemented, too soon to assess goals progression <30days   [] Goals require adjustment due to lack of progress  [] Patient is not progressing as expected and requires additional follow up with physician  [] Other    Prognosis for POC: [x] Good [] Fair  [] Poor      Patient requires continued skilled intervention: [x] Yes  [] No      Treatment/Activity Tolerance:  [] Patient tolerated treatment well [] Patient limited by fatique  [] Patient limited by pain  [] Patient limited by other medical complications  [] Other: Patient xiomara session well. She reported feeling good after manual stretching today.  She was educated to decrease

## 2022-01-01 NOTE — PROGRESS NOTES
Medicare Annual Wellness Visit  Are Name: Mallory Holland Date: 2021   MRN: <[id number]> Sex: Female   Age: 76 y.o. Ethnicity: Declined   : 1945 Race: White      Yvonne Carcamo is here for [facility]    Screenings for behavioral, psychosocial and functional/safety risks, and cognitive dysfunction are all negative except as indicated below. These results, as well as other patient data from the [address] form, are documented in Flowsheets linked to this Encounter. Allergies   Allergen Reactions    Blue Dyes (Parenteral)     Cinnamon     Eggs [Egg White]     Misc Natural Products      annato-natural dye    Mustard [Allyl Isothiocyanate]     Naprosyn [Naproxen]     Neosporin [Bacitracin-Polymyxin B]      Itch, rash    Other      TREE NUTS, PEANUTS    Pcn [Penicillins]     Red Dye      rash    Soy Allergy     Tylenol [Acetaminophen]     Yellow Dyes (Non-Tartrazine)      rosacea       Prior to Visit Medications    Medication Sig Taking?  Authorizing Provider   traMADol (ULTRAM) 50 MG tablet TAKE ONE TABLET BY MOUTH TWICE A DAY REDUCE DOSE AS PAIN BECOMES MANAGEABLE Yes Herber Kerr MD   ibuprofen (ADVIL;MOTRIN) 600 MG tablet TAKE ONE TABLET BY MOUTH EVERY 6 HOURS AS NEEDED FOR PAIN Yes Herber Kerr MD   beclomethasone (BECONASE-AQ) 80 MCG/ACT AERS nasal spray 2 sprays by Each Nostril route 2 times daily Yes Historical Provider, MD   amLODIPine (NORVASC) 5 MG tablet Take 1 tablet by mouth daily Yes Herber Kerr MD   simvastatin (ZOCOR) 40 MG tablet TAKE ONE TABLET BY MOUTH EVERY EVENING Yes Marcie Bell, APRN - CNP   Benralizumab (FASENRA PEN) 30 MG/ML SOAJ Inject 1 each into the skin Every 2 months Yes Historical Provider, MD   predniSONE (DELTASONE) 5 MG tablet Take 5 mg by mouth daily as needed Yes Historical Provider, MD   vitamin C (ASCORBIC ACID) 500 MG tablet Take 500 mg by mouth daily Yes Historical Provider, MD   Omega-3 Fatty Acids (OMEGA-3 FISH OIL) 300 MG CAPS Take 1 capsule by mouth daily Yes Historical Provider, MD   Vitamin A 2400 MCG (8000 UT) CAPS Take 1 capsule by mouth daily Yes Historical Provider, MD   lansoprazole (PREVACID) 30 MG delayed release capsule TAKE 1 CAPSULE EVERY DAY Yes Jim Prado MD   montelukast (SINGULAIR) 10 MG tablet Take 1 tablet by mouth nightly Yes Jim Prado MD   hydrOXYzine (ATARAX) 10 MG tablet TAKE ONE TABLET BY MOUTH THREE TIMES A DAY AS NEEDED FOR ITCHING Yes Jim Prado MD   Baclofen 5 MG TABS TAKE ONE TABLET BY MOUTH TWICE A DAY AS NEEDED FOR PAIN Yes Jim Prado MD   fexofenadine (ALLEGRA ALLERGY) 180 MG tablet Take 180 mg by mouth daily Yes Historical Provider, MD   desoximetasone (TOPICORT) 0.25 % cream Apply topically 2 times daily. Yes Jim Prado MD   mupirocin [facility]) 2 % ointment Apply topically 3 times daily. Yes Jim Prado MD   Tiotropium Bromide Monohydrate (SPIRIVA RESPIMAT IN) Inhale 2 puffs into the lungs daily  Yes Historical Provider, MD   ALVESCO 160 MCG/ACT AERS daily  Yes Historical Provider, MD   azelastine (ASTELIN) 137 MCG/SPRAY nasal spray 2 sprays by Nasal route daily. Use in each nostril as directed Yes Jim Prado MD   albuterol (PROVENTIL HFA;VENTOLIN HFA) 108 (90 BASE) MCG/ACT inhaler Inhale 2 puffs into the lungs. AS NEEDED. Yes Historical Provider, MD   Multiple Vitamins-Minerals (CENTRUM SILVER) TABS Take  by mouth. Yes Historical Provider, MD   Misc Natural Products (OSTEO BI-FLEX ADV TRIPLE ST) TABS Take 2 tablets by mouth daily. Yes Historical Provider, MD   Calcium Carbonate-Vit D-Min (CALTRATE 600+D PLUS) 600-400 MG-UNIT TABS Take 2 tablets by mouth daily.    Yes Historical Provider, MD       Past Medical History:   Diagnosis Date    Arthritis     Bronchial asthma     GERD (gastroesophageal reflux disease)     Hyperlipidemia     Hypertension     Mild tricuspid regurgitation 5/26/2016    Moderate mitral [Well developed] : well developed Never done    DTaP/Tdap/Td vaccine (1 - Tdap) Never done    Annual Wellness Visit (AWV)  Never done    Shingles Vaccine (3 of 3) 08/26/2019    Flu vaccine (Season Ended) 09/01/2021    Lipid screen  03/15/2022    Potassium monitoring  03/15/2022    Creatinine monitoring  03/15/2022    Colon cancer screen colonoscopy  06/13/2023    DEXA (modify frequency per FRAX score)  Completed    Pneumococcal 65+ years Vaccine  Completed    COVID-19 Vaccine  Completed    Hepatitis A vaccine  Aged Out    Hepatitis B vaccine  Aged Out    Hib vaccine  Aged Out    Meningococcal (ACWY) vaccine  Aged Out     Recommendations for Pin-Digital Due: see orders and patient instructions/AVS.  . Recommended screening schedule for the next 5-10 years is provided to the patient in written form: see Patient Instructions/AVS.    Hermelinda Duong was seen today for medicare awv. Diagnoses and all orders for this visit:    Routine general medical examination at a health care facility               [facility] is a 76 y.o. female being evaluated by a Virtual Visit (phone) encounter to address concerns as mentioned above. A caregiver was present when appropriate. Due to this being a TeleHealth encounter (During UOEWN-66 public health emergency), evaluation of the following organ systems was limited: Vitals/Constitutional/EENT/Resp/CV/GI//MS/Neuro/Skin/Heme-Lymph-Imm. Pursuant to the emergency declaration under the [address] [place] [address], [address] [place] [address] authority and the [employer] and [employer] Act, this Virtual Visit was conducted with patient's (and/or legal guardian's) consent, to reduce the patient's risk of exposure to COVID-19 and provide necessary medical care. The patient (and/or legal guardian) has also been advised to contact this office for worsening conditions or problems, and seek emergency medical treatment and/or call 911 if deemed necessary. [Well nourished] : well nourished Patient identification was verified at the start of the visit: Yes    Services were provided through phone to substitute for in-person clinic visit. Patient and provider were located at their individual homes. --MIKY Bermudez CNP on 4/27/2021 at 1:31 PM    An electronic signature was used to authenticate this note. [Well appearing] : well appearing [Deferred] : deferred [Acute distress] : no acute distress [Dysmorphic] : no dysmorphic [Abnormal shape] : no abnormal shape [Ear anomaly] : no ear anomaly [Abnormal nose shape] : no abnormal nose shape [Nasal discharge] : no nasal discharge [Mouth lesions] : no mouth lesions [Eye discharge] : no eye discharge [Icteric sclera] : no icteric sclera [Labored breathing] : non- labored breathing [Rigid] : not rigid [Mass] : no mass [Hepatomegaly] : no hepatomegaly [Splenomegaly] : no splenomegaly [Palpable bladder] : no palpable bladder [RUQ Tenderness] : no ruq tenderness [LUQ Tenderness] : no luq tenderness [RLQ Tenderness] : no rlq tenderness [LLQ Tenderness] : no llq tenderness [Right tenderness] : no right tenderness [Left tenderness] : no left tenderness [Renomegaly] : no renomegaly [Right-side mass] : no right-side mass [Left-side mass] : no left-side mass [Dimple] : no dimple [Hair Tuft] : no hair tuft [Limited limb movement] : no limited limb movement [Edema] : no edema [Rashes] : no rashes [Ulcers] : no ulcers [Abnormal turgor] : normal turgor [TextBox_92] : PENIS: Straight protuberant penis.  Meatus ample size in orthotopic position.  \par SCROTUM: Left testis initially palpable high in the canal and then possibly went into the abdomen.  Right testis in dependent position without palpable mass, hernia, hydrocele

## 2022-01-03 ENCOUNTER — HOSPITAL ENCOUNTER (OUTPATIENT)
Dept: PHYSICAL THERAPY | Age: 77
Setting detail: THERAPIES SERIES
Discharge: HOME | End: 2022-01-03
Payer: MEDICARE

## 2022-01-03 PROCEDURE — 97140 MANUAL THERAPY 1/> REGIONS: CPT | Performed by: PHYSICAL THERAPIST

## 2022-01-03 PROCEDURE — 97110 THERAPEUTIC EXERCISES: CPT | Performed by: PHYSICAL THERAPIST

## 2022-01-03 NOTE — FLOWSHEET NOTE
[facility], [address] [place], [address]  Phone: [phone]   Fax: [phone]    Physical Therapy Treatment Note/ Progress Report:         Date:  1/3/2022    Patient Name:  Jasmin Chen    :  1945  MRN: [id number]  Restrictions/Precautions:    Medical/Treatment Diagnosis Information:  Diagnosis: Rupture of left long head biceps tendon - R30.437, Arthritis of left shoulder region - M19.012  Treatment Diagnosis: decreased ROM, strength, and ADL status  Insurance/Certification information:  PT Insurance Information: [facility]  Physician Information:  Referring Practitioner: Dr. Garrett Abdul  Has the plan of care been signed (Y/N):        []  Yes  [x]  No     Date of Patient follow up with Physician: 2021      Is this a Progress Report:     []  Yes  [x]  No        Progress report/ Recertification will be due (10 Rx or 30 days whichever is less): 10 Hector 2022         Visit # Insurance Allowable Auth Required   4 BMN []  Yes [x]  No        Functional Scale:   UEFI   - 24% limitation       Latex Allergy:  [x]NO      []YES  Preferred Language for Healthcare:   [x]English       []other:      Pain level: 0 /10 1/3    SUBJECTIVE:  Patient states her shoulder feels pretty good. She states she only had occasional muscle soreness in the last week. She states she did notice a little bruising on the biceps after last session. States she still has difficulty using the LUE to wash/ put lotion on the back, so trying not to use the LUE as much. She states putting her hair up using the LUE increased pain over the weekend, but no problems this morning.     1/3    OBJECTIVE: See eval    Observation:    Test measurements:      RESTRICTIONS/PRECAUTIONS: none    Exercises/Interventions:   Exercise/Equipment Resistance/Repetitions Other comments   Stretching/PROM     Wand     IR cane 10 sec x 10     ER cane 10 sec x 10      Pulleys 10 sec x 10 flex and ABD    Pendulum     Wall slide 10x10\" flexion and scaption  Added 12/20             Isometrics     Retraction          Weight shift     Flexion  On HEP    Abduction  On HEP    External Rotation  On HEP    Internal Rotation  On HEP    Biceps     Triceps          PRE's     Flexion     Abduction     External Rotation     Internal Rotation     Shrugs     EXT     Reverse Flys     Serratus     Horizontal Abd with ER     Biceps 1# 3x10 Modified 12/27   Triceps - bent over 10 x 3 1# ^1/3   Retraction          Cable Column/Theraband     External Rotation     Internal Rotation     Shrugs     Lats     Ext Red 3 x10 ^1/3   Flex     Scapular Retraction Red 3x10 ^1/3   BIC     TRIC     PNF          Dynamic Stability     Supine shoulder flex ABCs 1x  Added 12/27        Plyoback          Manual interventions     Infraspinatus/supraspinatus/upper trap STM 4' 1/3   L shoulder stretching 4'  1/3   Cervical suboccipital release with rib 1 mobiliztaions     Thoracic mobilizations - PA         Therapeutic Exercise and NMR EXR  [x] (04168) Provided verbal/tactile cueing for activities related to strengthening, flexibility, endurance, ROM  for improvements in scapular, scapulothoracic and UE control with self care, reaching, carrying, lifting, house/yardwork, driving/computer work. [x] (09497) Provided verbal/tactile cueing for activities related to improving balance, coordination, kinesthetic sense, posture, motor skill, proprioception  to assist with  scapular, scapulothoracic and UE control with self care, reaching, carrying, lifting, house/yardwork, driving/computer work. Therapeutic Activities:    [x] (97755 or 23416) Provided verbal/tactile cueing for activities related to improving balance, coordination, kinesthetic sense, posture, motor skill, proprioception and motor activation to allow for proper function of scapular, scapulothoracic and UE control with self care, carrying, lifting, driving/computer work. Home Exercise Program:    [x] (77276) Reviewed/Progressed HEP activities related to strengthening, flexibility, endurance, ROM of scapular, scapulothoracic and UE control with self care, reaching, carrying, lifting, house/yardwork, driving/computer work  [] (71582) Reviewed/Progressed HEP activities related to improving balance, coordination, kinesthetic sense, posture, motor skill, proprioception of scapular, scapulothoracic and UE control with self care, reaching, carrying, lifting, house/yardwork, driving/computer work      Manual Treatments:  PROM / STM / Oscillations-Mobs:  G-I, II, III, IV (PA's, Inf., Post.)  [x] (59305) Provided manual therapy to mobilize soft tissue/joints of cervical/CT, scapular GHJ and UE for the purpose of modulating pain, promoting relaxation,  increasing ROM, reducing/eliminating soft tissue swelling/inflammation/restriction, improving soft tissue extensibility and allowing for proper ROM for normal function with self care, reaching, carrying, lifting, house/yardwork, driving/computer work    Modalities:     [] GAME READY (VASO)- for significant edema, swelling, pain control. Charges:  Timed Code Treatment Minutes: 45'    Total Treatment Minutes: 45'      [] EVAL (LOW) 40197 (typically 20 minutes face-to-face)  [] EVAL (MOD) 21351 (typically 30 minutes face-to-face)  [] EVAL (HIGH) 10934 (typically 45 minutes face-to-face)  [] RE-EVAL     [x] FB(43906) x 2   [] IONTO  [] NMR (01381) x     [] VASO  [x] Manual (94155) x 1   [] Other:  [] TA x      [] Mech Traction (31012)  [] ES(attended) (31103)      [] ES (un) (24633):         ASSESSMENT:        GOALS:  Patient stated goal: learn to control the pain    []? Progressing: []? Met: []? Not Met: []? Adjusted     Therapist goals for Patient:   Short Term Goals: To be achieved in: 2 weeks  1. Independent in HEP and progression per patient tolerance, in order to prevent re-injury. []? Progressing: []? Met: []?  Not Met: []? Adjusted   2. Patient will have a decrease in pain to facilitate improvement in movement, function, and ADLs as indicated by Functional Deficits. []? Progressing: []? Met: []? Not Met: []? Adjusted     Long Term Goals: To be achieved in: 4-6 weeks  1. Disability index score of 12% or less for the UEFI to assist with reaching prior level of function. []? Progressing: []? Met: []? Not Met: []? Adjusted  2. Patient will demonstrate increased left shoulder flex AROM to greater than or equal to 165 deg, ABD AROM to greater than or equal to 155 deg, ER AROM to greater than or equal to 90 deg, and IR AROM to greater than or equal to 50 deg to allow for proper joint functioning as indicated by patients Functional Deficits. []? Progressing: []? Met: []? Not Met: []? Adjusted  3. Patient will demonstrate an increase in left shoulder (flex, ABD, IR, and ER) strength to 4+/5 to allow for proper functional mobility as indicated by patients Functional Deficits. []? Progressing: []? Met: []? Not Met: []? Adjusted  4. Patient will return to ADLs without increased symptoms or restriction. []? Progressing: []? Met: []? Not Met: []? Adjusted  5. Patient will report carrying laundry to the basement pain free. []? Progressing: []? Met: []? Not Met: []? Adjusted       Overall Progression Towards Functional goals/ Treatment Progress Update:  [] Patient is progressing as expected towards functional goals listed. [] Progression is slowed due to complexities/Impairments listed. [] Progression has been slowed due to co-morbidities.   [x] Plan just implemented, too soon to assess goals progression <30days   [] Goals require adjustment due to lack of progress  [] Patient is not progressing as expected and requires additional follow up with physician  [] Other    Prognosis for POC: [x] Good [] Fair  [] Poor      Patient requires continued skilled intervention: [x] Yes  [] No      Treatment/Activity Tolerance:  [] Patient tolerated treatment well [] Patient limited by fatique  [] Patient limited by pain  [] Patient limited by other medical complications  [] Other: Patient xiomara session well. Minimal discomfort with manual stretching. She demonstrated increased strength, noted by increased sets. Will re-assess next session before pt returns to MD.    1/3    Patient education: Patient education on PT and plan of care including diagnosis, prognosis, treatment goals and options. Patient agrees with discussed POC and treatment and is aware of rehab process. 12/13    PLAN: See eval 12/13  [] Continue per plan of care [] Alter current plan (see comments above)  [x] Plan of care initiated [] Hold pending MD visit [] Discharge      Electronically signed by:  Caitlin Souza, PT, DPT         Note: If patient does not return for scheduled/ recommended follow up visits, this note will serve as a discharge from care along with most recent update on progress.

## 2022-01-10 ENCOUNTER — HOSPITAL ENCOUNTER (OUTPATIENT)
Dept: PHYSICAL THERAPY | Age: 77
Setting detail: THERAPIES SERIES
Discharge: HOME | End: 2022-01-10
Payer: MEDICARE

## 2022-01-10 PROCEDURE — 97110 THERAPEUTIC EXERCISES: CPT | Performed by: PHYSICAL THERAPIST

## 2022-01-10 PROCEDURE — 97112 NEUROMUSCULAR REEDUCATION: CPT | Performed by: PHYSICAL THERAPIST

## 2022-01-10 NOTE — PLAN OF CARE
[facility], [address] [place], [address]  Phone: [phone]   Fax: [phone]     Physical Therapy Re-Certification Plan of Care    Dear Dr. Chelle Rodriguez,    We had the pleasure of treating the following patient for physical therapy services at [address] [place] [address]. A summary of our findings can be found in the updated assessment below. This includes our plan of care. If you have any questions or concerns regarding these findings, please do not hesitate to contact me at the office phone number checked above. Thank you for the referral.     Physician Signature:________________________________Date:__________________  By signing above (or electronic signature), therapists plan is approved by physician      Overall Response to Treatment:   []Patient is responding well to treatment and improvement is noted with regards  to goals   []Patient should continue to improve in reasonable time if they continue HEP   []Patient has plateaued and is no longer responding to skilled PT intervention    []Patient is getting worse and would benefit from return to referring MD   []Patient unable to adhere to initial POC   []Other:Patient presents with improved symptoms. She is able to manage her symptoms with chores at home, modifying chores if necessary. She demonstrates improved shoulder flexion and IR ROM. She demonstrates improved L shoulder strength, as well. She is independent with her HEP and has been keeping symptoms managed.  Pt follows-up with MD this week and will follow-up with PT per MD.     Date range of previous POC Visits: 21 - 1/10/22    Total Visits: 5            Physical Therapy Treatment Note/ Progress Report:         Date:  1/10/2022    Patient Name:  Stacy Lemus    :  1945  MRN: [id number]  Restrictions/Precautions:    Medical/Treatment Diagnosis Information:  Diagnosis: Rupture of left long head biceps tendon - [id number], Arthritis of left shoulder region - M19.012  Treatment Diagnosis: decreased ROM, strength, and ADL status  Insurance/Certification information:  PT Insurance Information: [facility]  Physician Information:  Referring Practitioner: Dr. Contreras Montgomery  Has the plan of care been signed (Y/N):        [x]  Yes  []  No     Date of Patient follow up with Physician: 12 Jan 2021      Is this a Progress Report:     [x]  Yes  []  No        Progress report/ Recertification will be due (10 Rx or 30 days whichever is less): 7 Feb 2022         Visit # Insurance Allowable Auth Required   5 BMN []  Yes [x]  No        Functional Scale:   UEFI  12/13 - 24% limitation  1/10 - 5% limitation       Latex Allergy:  [x]NO      []YES  Preferred Language for Healthcare:   [x]English       []other:      Pain level: 0 /10 1/10    SUBJECTIVE:  Patient states she is doing well today. She states since last session she has felt several twinges in the muscle again, but did not last all day. 1/10    OBJECTIVE: 1/10   Observation:    Test measurements:               ROM AROM  Comment     L R     Flexion 167 169     Abduction 133 + pain 155     ER 85 109     IR 56 43     Other(cervical)               Strength L R Comment   Flexion 4/5 4/5     Abduction 4/5 4/5     ER 4/5 4/5     IR 4+/5 4/5               Upper Trap         Lower Trap         Mid Trap 4-/5  4-/5     Rhomboids 4/5 4/5     Biceps 4/5 4+/5     Triceps 4/5 4+/5        Special Tests Results/Comment   Pickett-Alex Pos   Neers Pos   Speeds Neg   OBriens Neg   Other        Reflexes/Sensation:               [x]? Dermatomes/Myotomes intact               [x]? Reflexes equal and normal bilaterally              []?Other:     Joint mobility:              []?Normal                       [x]? Hypo - rib mobility               []?Hyper     Palpation: TTP - L biceps     Functional Mobility/Transfers: normal          RESTRICTIONS/PRECAUTIONS: none    Exercises/Interventions: Exercise/Equipment Resistance/Repetitions Other comments   Stretching/PROM     Wand     IR cane 10 sec x 10     ER cane 10 sec x 10      Pulleys 10 sec x 10 flex and ABD    Pendulum     Wall slide 10x10\" flexion and scaption  Added 12/20             Isometrics     Retraction          Weight shift     Flexion  On HEP    Abduction  On HEP    External Rotation  On HEP    Internal Rotation  On HEP    Biceps     Triceps          PRE's     Flexion     Abduction     External Rotation     Internal Rotation     Shrugs     EXT     Reverse Flys     Serratus     Horizontal Abd with ER     Biceps 1# 3x10 Modified 12/27   Triceps - bent over 10 x 3 1# ^1/3   Retraction          Cable Column/Theraband     External Rotation     Internal Rotation     Shrugs     Lats Blue TB 3 x 10  Added 1/10   Ext Red 3 x10 ^1/3   Flex     Scapular Retraction Red 3x10 ^1/3   BIC     TRIC     PNF     Prone T 10 x 3  Added 1/10        Dynamic Stability     Supine shoulder flex ABCs      Plyoback          Manual interventions     Infraspinatus/supraspinatus/upper trap STM L shoulder stretching Cervical suboccipital release with rib 1 mobiliztaions     Thoracic mobilizations - PA         Therapeutic Exercise and NMR EXR  [x] (83857) Provided verbal/tactile cueing for activities related to strengthening, flexibility, endurance, ROM  for improvements in scapular, scapulothoracic and UE control with self care, reaching, carrying, lifting, house/yardwork, driving/computer work. [x] (08216) Provided verbal/tactile cueing for activities related to improving balance, coordination, kinesthetic sense, posture, motor skill, proprioception  to assist with  scapular, scapulothoracic and UE control with self care, reaching, carrying, lifting, house/yardwork, driving/computer work.     Therapeutic Activities:    [x] (18824 or 18002) Provided verbal/tactile cueing for activities related to improving balance, coordination, kinesthetic sense, posture, motor skill, proprioception and motor activation to allow for proper function of scapular, scapulothoracic and UE control with self care, carrying, lifting, driving/computer work. Home Exercise Program:    [x] (64864) Reviewed/Progressed HEP activities related to strengthening, flexibility, endurance, ROM of scapular, scapulothoracic and UE control with self care, reaching, carrying, lifting, house/yardwork, driving/computer work  [] (30017) Reviewed/Progressed HEP activities related to improving balance, coordination, kinesthetic sense, posture, motor skill, proprioception of scapular, scapulothoracic and UE control with self care, reaching, carrying, lifting, house/yardwork, driving/computer work      Manual Treatments:  PROM / STM / Oscillations-Mobs:  G-I, II, III, IV (PA's, Inf., Post.)  [x] (11583) Provided manual therapy to mobilize soft tissue/joints of cervical/CT, scapular GHJ and UE for the purpose of modulating pain, promoting relaxation,  increasing ROM, reducing/eliminating soft tissue swelling/inflammation/restriction, improving soft tissue extensibility and allowing for proper ROM for normal function with self care, reaching, carrying, lifting, house/yardwork, driving/computer work    Modalities:     [] GAME READY (VASO)- for significant edema, swelling, pain control. Charges:  Timed Code Treatment Minutes: 30'    Total Treatment Minutes: 28'       [] EVAL (LOW) 39238 (typically 20 minutes face-to-face)  [] EVAL (MOD) 60412 (typically 30 minutes face-to-face)  [] EVAL (HIGH) 06994 (typically 45 minutes face-to-face)  [] RE-EVAL     [x] DU(77541) x 1   [] IONTO  [x] NMR (03826) x 1     [] VASO  [] Manual (42723) x   [] Other:  [] TA x      [] Mech Traction (61470)  [] ES(attended) (29264)      [] ES (un) (83949):         ASSESSMENT:        GOALS: 1/10  Patient stated goal: learn to control the pain    []? Progressing: [x]? Met: []? Not Met: []?  Adjusted     Therapist goals for Patient:   Short Term Goals: To be achieved in: 2 weeks  1. Independent in HEP and progression per patient tolerance, in order to prevent re-injury. []? Progressing: [x]? Met: []? Not Met: []? Adjusted   2. Patient will have a decrease in pain to facilitate improvement in movement, function, and ADLs as indicated by Functional Deficits. []? Progressing: [x]? Met: []? Not Met: []? Adjusted     Long Term Goals: To be achieved in: 4-6 weeks  1. Disability index score of 12% or less for the UEFI to assist with reaching prior level of function. []? Progressing: [x]? Met: []? Not Met: []? Adjusted  2. Patient will demonstrate increased left shoulder flex AROM to greater than or equal to 165 deg, ABD AROM to greater than or equal to 155 deg, ER AROM to greater than or equal to 90 deg, and IR AROM to greater than or equal to 50 deg to allow for proper joint functioning as indicated by patients Functional Deficits. [x]? Progressing: []? Met: []? Not Met: []? Adjusted  3. Patient will demonstrate an increase in left shoulder (flex, ABD, IR, and ER) strength to 4+/5 to allow for proper functional mobility as indicated by patients Functional Deficits. [x]? Progressing: []? Met: []? Not Met: []? Adjusted  4. Patient will return to ADLs without increased symptoms or restriction. []? Progressing: [x]? Met: []? Not Met: []? Adjusted  5. Patient will report carrying laundry to the basement pain free. []? Progressing: [x]? Met: []? Not Met: []? Adjusted       Overall Progression Towards Functional goals/ Treatment Progress Update:  [x] Patient is progressing as expected towards functional goals listed. [] Progression is slowed due to complexities/Impairments listed. [] Progression has been slowed due to co-morbidities.   [] Plan just implemented, too soon to assess goals progression <30days   [] Goals require adjustment due to lack of progress  [] Patient is not progressing as expected and requires additional follow up with physician  [] Other    Prognosis for POC: [x] Good [] Fair  [] Poor      Patient requires continued skilled intervention: [x] Yes  [] No      Treatment/Activity Tolerance:  [] Patient tolerated treatment well [] Patient limited by fatique  [] Patient limited by pain  [] Patient limited by other medical complications  [] Other: Patient presents with improved symptoms. She is able to manage her symptoms with chores at home, modifying chores if necessary. She demonstrates improved shoulder flexion and IR ROM. She demonstrates improved L shoulder strength, as well. She is independent with her HEP and has been keeping symptoms managed. Pt follows-up with MD this week and will follow-up with PT per MD.     1/10    Patient education: Patient education on PT and plan of care including diagnosis, prognosis, treatment goals and options. Patient agrees with discussed POC and treatment and is aware of rehab process. 12/13    PLAN: Will follow-up with PT per MD after visit 1/10  [] Continue per plan of care [] Alter current plan (see comments above)  [] Plan of care initiated [x] Hold pending MD visit [] Discharge      Electronically signed by:  Elizabeth Whitfield PT, DPT         Note: If patient does not return for scheduled/ recommended follow up visits, this note will serve as a discharge from care along with most recent update on progress.

## 2022-01-12 ENCOUNTER — OFFICE VISIT (OUTPATIENT)
Dept: ORTHOPEDIC SURGERY | Age: 77
End: 2022-01-12
Payer: MEDICARE

## 2022-01-12 VITALS — WEIGHT: 133 LBS | BODY MASS INDEX: 24.48 KG/M2 | HEIGHT: 62 IN

## 2022-01-12 DIAGNOSIS — M19.012 OSTEOARTHRITIS OF GLENOHUMERAL JOINT, LEFT: Primary | ICD-10-CM

## 2022-01-12 PROCEDURE — 1090F PRES/ABSN URINE INCON ASSESS: CPT | Performed by: ORTHOPAEDIC SURGERY

## 2022-01-12 PROCEDURE — G8420 CALC BMI NORM PARAMETERS: HCPCS | Performed by: ORTHOPAEDIC SURGERY

## 2022-01-12 PROCEDURE — 1123F ACP DISCUSS/DSCN MKR DOCD: CPT | Performed by: ORTHOPAEDIC SURGERY

## 2022-01-12 PROCEDURE — G8427 DOCREV CUR MEDS BY ELIG CLIN: HCPCS | Performed by: ORTHOPAEDIC SURGERY

## 2022-01-12 PROCEDURE — 1036F TOBACCO NON-USER: CPT | Performed by: ORTHOPAEDIC SURGERY

## 2022-01-12 PROCEDURE — G8399 PT W/DXA RESULTS DOCUMENT: HCPCS | Performed by: ORTHOPAEDIC SURGERY

## 2022-01-12 PROCEDURE — 4040F PNEUMOC VAC/ADMIN/RCVD: CPT | Performed by: ORTHOPAEDIC SURGERY

## 2022-01-12 PROCEDURE — G8484 FLU IMMUNIZE NO ADMIN: HCPCS | Performed by: ORTHOPAEDIC SURGERY

## 2022-01-12 PROCEDURE — 99212 OFFICE O/P EST SF 10 MIN: CPT | Performed by: ORTHOPAEDIC SURGERY

## 2022-01-12 NOTE — PROGRESS NOTES
Chief Complaint    Follow-up (Left Shoulder)      History of Present Illness:  Yvonne Carcamo is a pleasant, 68 y.o., female, here today for follow up of her left shoulder. We saw this patient back in December and initiated treatment for glenohumeral osteoarthritis. We did administer an intra-articular corticosteroid injection and formal physical therapy. She was able to meet with her therapists for a few sessions to obtain a home-based program. Overall she feels her shoulder is doing really well. The injection provided significant relief from symptoms. She reports no new injuries or setbacks. She does have some knee pain and is wondering if we can provide a referral today. Pain Assessment  Location of Pain: Shoulder  Location Modifiers: Left  Severity of Pain: 1  Quality of Pain: Throbbing  Duration of Pain: Persistent  Frequency of Pain: Intermittent  Aggravating Factors:  (certain movements)  Limiting Behavior: Some  Relieving Factors: Rest,Heat,Nsaids  Work-Related Injury: No  Are there other pain locations you wish to document?: No      Medical History:  Patient's medications, allergies, past medical, surgical, social and family histories were reviewed and updated as appropriate. Review of Systems  A 14 point review of systems was completed by the patient on 12/2/21 and is available in the media section of the scanned medical record and was reviewed on 1/12/2022. The review is negative with the exception of those things mentioned in the HPI and Past Medical History    Vital Signs:  Vitals:       General/Appearance: Alert and oriented and in no apparent distress. Skin:  There are no skin lesions, cellulitis, or extreme edema. The patient has warm and well-perfused Bilateral upper extremities with brisk capillary refill. Left Shoulder Exam:  Inspection: No gross deformities, no signs of infection. Palpation: Deferred    Active Range of Motion:   Forward Elevation 155, Abduction 155, External Rotation 60 vs 70, Internal Rotation T10 vs T9 bilaterally    Passive Range of Motion: Deferred    Strength:  External Rotation 4+/5, Internal Rotation 4+/5    Special Tests: No Martin muscle deformity. Neurovascular: Sensation to light touch is intact, no motor deficits, palpable radial pulses 2+      Radiology:     No new XR obtained at this time. Assessment :  Ms. Jak Cotton is a pleasant, 68 y.o. patient with pain related to glenohumeral osteoarthritis. Impression:  Encounter Diagnosis   Name Primary?  Osteoarthritis of glenohumeral joint, left Yes       Office Procedures:  No orders of the defined types were placed in this encounter. Treatment Plan: This patient has responded well to a bout of conservative management. We explained we may repeat the injection in the future if needed. Typically we wait 3 months in between injections, however we would like to space these out further when possible. We recommend this patient continue her home exercises and continue activities as tolerated. For her knee pain we recommend she go back to see Dr. Nayeli Grey.     We will see Yvonne back in 3 months and/or as needed. All questions were answered to patient's satisfaction and She was encouraged to call with any further questions or concerns. Jak Cotton is in agreement with this plan. 1/12/2022  9:48 AM    Hugh Briggs ATC  [address] [facility]    During this examination, I, Jarod Dadaaron, functioned as a scribe for Dr. Sheba Renae. The history taking and physical examination were performed by Dr. Simón Batres. All counseling during the appointment was performed between the patient and Dr. Simón Batres. 1/12/22  ______________  I, Dr. Sheba Renae, personally performed the services described in this documentation as described by Hugh Briggs ATC in my presence, and it is both accurate and complete. Samer VINCENZO Batres, MD, PhD  1/12/2022

## 2022-01-17 ENCOUNTER — OFFICE VISIT (OUTPATIENT)
Dept: ORTHOPEDIC SURGERY | Age: 77
End: 2022-01-17
Payer: MEDICARE

## 2022-01-17 VITALS — BODY MASS INDEX: 24.48 KG/M2 | RESPIRATION RATE: 12 BRPM | HEIGHT: 62 IN | WEIGHT: 133 LBS

## 2022-01-17 DIAGNOSIS — M17.12 ARTHRITIS OF LEFT KNEE: ICD-10-CM

## 2022-01-17 DIAGNOSIS — M25.562 ACUTE PAIN OF LEFT KNEE: Primary | ICD-10-CM

## 2022-01-17 PROCEDURE — 20610 DRAIN/INJ JOINT/BURSA W/O US: CPT | Performed by: ORTHOPAEDIC SURGERY

## 2022-01-17 PROCEDURE — 4040F PNEUMOC VAC/ADMIN/RCVD: CPT | Performed by: ORTHOPAEDIC SURGERY

## 2022-01-17 PROCEDURE — 99214 OFFICE O/P EST MOD 30 MIN: CPT | Performed by: ORTHOPAEDIC SURGERY

## 2022-01-17 PROCEDURE — 1123F ACP DISCUSS/DSCN MKR DOCD: CPT | Performed by: ORTHOPAEDIC SURGERY

## 2022-01-17 PROCEDURE — G8484 FLU IMMUNIZE NO ADMIN: HCPCS | Performed by: ORTHOPAEDIC SURGERY

## 2022-01-17 PROCEDURE — 1090F PRES/ABSN URINE INCON ASSESS: CPT | Performed by: ORTHOPAEDIC SURGERY

## 2022-01-17 PROCEDURE — G8427 DOCREV CUR MEDS BY ELIG CLIN: HCPCS | Performed by: ORTHOPAEDIC SURGERY

## 2022-01-17 PROCEDURE — G8420 CALC BMI NORM PARAMETERS: HCPCS | Performed by: ORTHOPAEDIC SURGERY

## 2022-01-17 PROCEDURE — 1036F TOBACCO NON-USER: CPT | Performed by: ORTHOPAEDIC SURGERY

## 2022-01-17 PROCEDURE — G8399 PT W/DXA RESULTS DOCUMENT: HCPCS | Performed by: ORTHOPAEDIC SURGERY

## 2022-01-17 RX ORDER — LIDOCAINE HYDROCHLORIDE 10 MG/ML
4 INJECTION, SOLUTION INFILTRATION; PERINEURAL ONCE
Status: COMPLETED | OUTPATIENT
Start: 2022-01-17 | End: 2022-01-17

## 2022-01-17 RX ORDER — HYDROXYZINE HYDROCHLORIDE 10 MG/1
TABLET, FILM COATED ORAL
COMMUNITY
Start: 2022-01-07

## 2022-01-17 RX ORDER — METHYLPREDNISOLONE ACETATE 40 MG/ML
80 INJECTION, SUSPENSION INTRA-ARTICULAR; INTRALESIONAL; INTRAMUSCULAR; SOFT TISSUE ONCE
Status: COMPLETED | OUTPATIENT
Start: 2022-01-17 | End: 2022-01-17

## 2022-01-17 RX ADMIN — METHYLPREDNISOLONE ACETATE 80 MG: 40 INJECTION, SUSPENSION INTRA-ARTICULAR; INTRALESIONAL; INTRAMUSCULAR; SOFT TISSUE at 10:11

## 2022-01-17 RX ADMIN — LIDOCAINE HYDROCHLORIDE 4 ML: 10 INJECTION, SOLUTION INFILTRATION; PERINEURAL at 10:11

## 2022-01-17 NOTE — PROGRESS NOTES
Shari Reed is seen today for left knee pain. She has had pain for about 4 weeks. She notices particular difficulty with stairs. She has pain under the kneecap and on the medial knee. She has been using ibuprofen and ice. Meloxicam was not helpful. Pain ranges from 5-9 out of 10. Her worst pain is descending stairs. She denies trauma. She has had some intermittent issues over the years dating back as long as 20 years. History: Patient's relevant past family, medical, and social history are reviewed as part of today's visit. ROS of pertinent positives and negatives as above; otherwise negative. General Exam:    Vitals: Resp. rate 12, height 5' 2\" (1.575 m), weight 133 lb (60.3 kg), not currently breastfeeding. Constitutional: Patient is adequately groomed with no evidence of malnutrition  Mental Status: The patient is oriented to time, place and person. The patient's mood and affect are appropriate. Gait:  Patient walks with normal gait and station. Lymphatic: The lymphatic examination bilaterally reveals all areas to be without enlargement or induration. Vascular: Examination reveals no swelling or calf tenderness. Peripheral pulses are palpable and 2+. Neurological: The patient has good coordination. There is no weakness or sensory deficit. Skin:    Head/Neck: inspection reveals no rashes, ulcerations or lesions. Trunk:  inspection reveals no rashes, ulcerations or lesions. Right Lower Extremity: inspection reveals no rashes, ulcerations or lesions. Left Lower Extremity: inspection reveals no rashes, ulcerations or lesions. Examination of the bilateral hips reveals normal flexion and extension. There is no restriction in rotation. There is no tenderness to palpation anteriorly posteriorly or laterally. Right knee examination demonstrates no effusion. There is no tenderness to palpation over the medial or lateral joint line.   There is no discomfort over the patellar tendon. There is no palpable popliteal cyst.  Sensation is intact. Range of motion is normal.  There is no patellofemoral crepitation. There is no instability to varus or  valgus stress applied at 0, 30, 60, or 90° of flexion. There is no anterior or posterior drawer. Lachman examination is normal.    Left knee has mild crepitation. She has some mild medial joint line tenderness. She has no drainable effusion. She is stable with full range of motion. Calf is soft. X-rays were obtained today in the office and interpreted by me. AP standing, PA flexed, and merchant views of the bilateral knees as well as a lateral of the left knee. These demonstrate:  Medial patellofemoral spurring left greater than right. Assessment: Left knee arthritis. Plan: We discussed her options at length. She like to try cortisone injection. Procedure: Under sterile technique, left knee was injected into the suprapatellar pouch with 80 mg of Depo-Medrol and 40 mg of lidocaine. She tolerated that well. Follow-up with me on an as-needed basis.

## 2022-11-04 ENCOUNTER — TELEPHONE (OUTPATIENT)
Dept: ORTHOPEDIC SURGERY | Age: 77
End: 2022-11-04

## 2022-11-04 NOTE — TELEPHONE ENCOUNTER
Patient scheduled appointment online. Last seen 1/17/22 for lt knee, 1/12/22 by Dr. Korey Alvarez for lt shldr. Sarika Piña to stay in this spot?

## 2022-11-07 ENCOUNTER — OFFICE VISIT (OUTPATIENT)
Dept: ORTHOPEDIC SURGERY | Age: 77
End: 2022-11-07
Payer: COMMERCIAL

## 2022-11-07 VITALS — BODY MASS INDEX: 24.48 KG/M2 | WEIGHT: 133 LBS | HEIGHT: 62 IN | RESPIRATION RATE: 12 BRPM

## 2022-11-07 DIAGNOSIS — M19.012 OSTEOARTHRITIS OF GLENOHUMERAL JOINT, LEFT: ICD-10-CM

## 2022-11-07 DIAGNOSIS — M25.512 ACUTE PAIN OF LEFT SHOULDER: ICD-10-CM

## 2022-11-07 DIAGNOSIS — M25.562 ACUTE PAIN OF LEFT KNEE: ICD-10-CM

## 2022-11-07 DIAGNOSIS — M17.12 ARTHRITIS OF LEFT KNEE: Primary | ICD-10-CM

## 2022-11-07 PROCEDURE — 20610 DRAIN/INJ JOINT/BURSA W/O US: CPT | Performed by: ORTHOPAEDIC SURGERY

## 2022-11-07 RX ORDER — METHYLPREDNISOLONE ACETATE 40 MG/ML
80 INJECTION, SUSPENSION INTRA-ARTICULAR; INTRALESIONAL; INTRAMUSCULAR; SOFT TISSUE ONCE
Status: COMPLETED | OUTPATIENT
Start: 2022-11-07 | End: 2022-11-07

## 2022-11-07 RX ORDER — LIDOCAINE HYDROCHLORIDE 10 MG/ML
4 INJECTION, SOLUTION INFILTRATION; PERINEURAL ONCE
Status: COMPLETED | OUTPATIENT
Start: 2022-11-07 | End: 2022-11-07

## 2022-11-07 RX ADMIN — LIDOCAINE HYDROCHLORIDE 4 ML: 10 INJECTION, SOLUTION INFILTRATION; PERINEURAL at 13:19

## 2022-11-07 RX ADMIN — METHYLPREDNISOLONE ACETATE 80 MG: 40 INJECTION, SUSPENSION INTRA-ARTICULAR; INTRALESIONAL; INTRAMUSCULAR; SOFT TISSUE at 13:21

## 2022-11-07 RX ADMIN — LIDOCAINE HYDROCHLORIDE 4 ML: 10 INJECTION, SOLUTION INFILTRATION; PERINEURAL at 13:20

## 2022-11-07 NOTE — PROGRESS NOTES
Donn Marie returns today for her left knee and left shoulder. Left knee had a cortisone injection on January 17 and left shoulder on January 12. She said that she has noticed increasing discomfort over the last 3 months. The shoulder is fairly constant at about 2 out of 10 the knee ranges anywhere from 1-7 out of 10 but typically with stairs. She uses arthritis cream on her shoulder and sometimes uses tramadol for restless legs and baclofen for her cervical spine. She has decided she does not want to consider arthroplasty at this point. General Exam:    Vitals: Resp. rate 12, height 5' 2\" (1.575 m), weight 133 lb (60.3 kg), not currently breastfeeding. Constitutional: Patient is adequately groomed with no evidence of malnutrition  Mental Status: The patient is oriented to time, place and person. The patient's mood and affect are appropriate. Left shoulder has good strength throughout the cuff but mild crepitation and moderate tenderness anteriorly. Neurologic and vascular exams are normal.    Left shoulder x-rays are reviewed which show moderate glenohumeral degenerative change. Left shoulder MRI demonstrates:  CONCLUSION:   1. Rupture of the long head biceps tendon with the free end retracted distally beyond the edge    of the field of view. 2. Near full-thickness tear of the supraspinatus at the junction of the critical zone and    insertion. The tear measures 3mm x 3mm. 3. Anatomic variation with an accessory tendon of the pectoralis minor inserting upon the    glenohumeral capsule and supraspinatus tendon. 4. Anatomic variation with a thick rotator cable. 5. Grade 3 glenohumeral chondromalacia. 6. Glenohumeral joint effusion and synovitis. Left knee has mild crepitation. She has some mild medial joint line tenderness. She has no drainable effusion. She is stable with full range of motion. Calf is soft.      X-rays of the left knee were again reviewed demonstrating  Medial patellofemoral spurring left greater than right. Assessment: Left knee arthritis. Left shoulder arthritis with cuff tendinitis      Plan: She understands injections can be given no sooner than every 3 months. If she gets an injection barring complication she would not be eligible for surgery in the next 3 months. She does not want to consider surgical treatment. Procedure: After obtaining verbal consent, and under sterile technique the left shoulder is injected anteriorly into the glenohumeral space with 80 mg of Depo-Medrol and 40 mg of lidocaine. After obtaining verbal consent, Under sterile techniqueleft knee was injected into the anterolateral arthroscopy portal with 80 mg of Depo-Medrol and 40 mg of lidocaine. She tolerated that well.

## 2022-12-13 ENCOUNTER — HOSPITAL ENCOUNTER (OUTPATIENT)
Dept: WOMENS IMAGING | Age: 77
Discharge: HOME | End: 2022-12-13
Payer: MEDICARE

## 2022-12-13 DIAGNOSIS — M85.80 OSTEOPENIA, UNSPECIFIED LOCATION: ICD-10-CM

## 2022-12-13 PROCEDURE — 77080 DXA BONE DENSITY AXIAL: CPT

## 2023-10-30 ENCOUNTER — OFFICE VISIT (OUTPATIENT)
Dept: ORTHOPEDIC SURGERY | Age: 78
End: 2023-10-30
Payer: MEDICARE

## 2023-10-30 VITALS — HEIGHT: 62 IN | BODY MASS INDEX: 23.19 KG/M2 | RESPIRATION RATE: 12 BRPM | WEIGHT: 126 LBS

## 2023-10-30 DIAGNOSIS — M19.012 OSTEOARTHRITIS OF GLENOHUMERAL JOINT, LEFT: Primary | ICD-10-CM

## 2023-10-30 PROCEDURE — 1090F PRES/ABSN URINE INCON ASSESS: CPT | Performed by: ORTHOPAEDIC SURGERY

## 2023-10-30 PROCEDURE — G8427 DOCREV CUR MEDS BY ELIG CLIN: HCPCS | Performed by: ORTHOPAEDIC SURGERY

## 2023-10-30 PROCEDURE — 1036F TOBACCO NON-USER: CPT | Performed by: ORTHOPAEDIC SURGERY

## 2023-10-30 PROCEDURE — 1123F ACP DISCUSS/DSCN MKR DOCD: CPT | Performed by: ORTHOPAEDIC SURGERY

## 2023-10-30 PROCEDURE — G8420 CALC BMI NORM PARAMETERS: HCPCS | Performed by: ORTHOPAEDIC SURGERY

## 2023-10-30 PROCEDURE — G8484 FLU IMMUNIZE NO ADMIN: HCPCS | Performed by: ORTHOPAEDIC SURGERY

## 2023-10-30 PROCEDURE — 99213 OFFICE O/P EST LOW 20 MIN: CPT | Performed by: ORTHOPAEDIC SURGERY

## 2023-10-30 PROCEDURE — 20610 DRAIN/INJ JOINT/BURSA W/O US: CPT | Performed by: ORTHOPAEDIC SURGERY

## 2023-10-30 PROCEDURE — G8399 PT W/DXA RESULTS DOCUMENT: HCPCS | Performed by: ORTHOPAEDIC SURGERY

## 2023-10-30 RX ORDER — METHYLPREDNISOLONE ACETATE 40 MG/ML
80 INJECTION, SUSPENSION INTRA-ARTICULAR; INTRALESIONAL; INTRAMUSCULAR; SOFT TISSUE ONCE
Status: COMPLETED | OUTPATIENT
Start: 2023-10-30 | End: 2023-10-30

## 2023-10-30 RX ADMIN — METHYLPREDNISOLONE ACETATE 80 MG: 40 INJECTION, SUSPENSION INTRA-ARTICULAR; INTRALESIONAL; INTRAMUSCULAR; SOFT TISSUE at 13:52

## 2023-10-30 RX ADMIN — Medication 4 ML: at 13:51

## 2023-10-30 NOTE — PROGRESS NOTES
Luis Lange returns today complaining of left shoulder pain. We did an injection in November 2022. She had complete relief until July of this year. Lidocaine patches helped until September. She now says that her shoulder locks up and she can have pain at 7 out of 10 in the anterior shoulder. General Exam:    Vitals: Resp. rate 12, height 1.575 m (5' 2\"), weight 57.2 kg (126 lb), not currently breastfeeding. Constitutional: Patient is adequately groomed with no evidence of malnutrition  Mental Status: The patient is oriented to time, place and person. The patient's mood and affect are appropriate. Left shoulder has tenderness anteriorly. She has good strength throughout the cuff. Neurologic and vascular exams are normal.    Xrays of the left shoulder were obtained today in the office and interpreted by me. AP in the scapular plane, axillary lateral, and scapular Y. These demonstrate: Moderate glenohumeral arthritis with moderate progression from x-rays obtained in 2021. She has a inferior humeral head osteophyte and narrowing at the glenohumeral joint. Assessment: Ongoing discomfort associated with left shoulder arthritis. Plan: We will proceed with another injection today. After obtaining verbal consent and under sterile technique left shoulder was injected into the anterior glenohumeral capsule with 80 mg of Depo-Medrol and 40 mg of lidocaine. She tolerated that well. Follow-up with me on an as-needed basis.

## 2024-03-22 ENCOUNTER — APPOINTMENT (OUTPATIENT)
Dept: ULTRASOUND IMAGING | Age: 79
DRG: 885 | End: 2024-03-22
Attending: STUDENT IN AN ORGANIZED HEALTH CARE EDUCATION/TRAINING PROGRAM
Payer: MEDICARE

## 2024-03-22 ENCOUNTER — HOSPITAL ENCOUNTER (INPATIENT)
Age: 79
LOS: 5 days | Discharge: HOME | DRG: 885 | End: 2024-03-27
Attending: STUDENT IN AN ORGANIZED HEALTH CARE EDUCATION/TRAINING PROGRAM | Admitting: STUDENT IN AN ORGANIZED HEALTH CARE EDUCATION/TRAINING PROGRAM
Payer: MEDICARE

## 2024-03-22 PROBLEM — R44.3 HALLUCINATIONS: Status: ACTIVE | Noted: 2024-03-22

## 2024-03-22 PROBLEM — R44.1 VISUAL HALLUCINATIONS: Status: ACTIVE | Noted: 2024-03-22

## 2024-03-22 LAB
ALBUMIN SERPL-MCNC: 4.3 G/DL (ref 3.4–5)
ALBUMIN/GLOB SERPL: 2 {RATIO} (ref 1.1–2.2)
ALP SERPL-CCNC: 54 U/L (ref 40–129)
ALT SERPL-CCNC: 58 U/L (ref 10–40)
AMMONIA PLAS-SCNC: 17 UMOL/L (ref 11–51)
ANION GAP SERPL CALCULATED.3IONS-SCNC: 11 MMOL/L (ref 3–16)
AST SERPL-CCNC: 89 U/L (ref 15–37)
BASOPHILS # BLD: 0 K/UL (ref 0–0.2)
BASOPHILS NFR BLD: 0.1 %
BILIRUB SERPL-MCNC: 0.8 MG/DL (ref 0–1)
BUN SERPL-MCNC: 13 MG/DL (ref 7–20)
CALCIUM SERPL-MCNC: 8.9 MG/DL (ref 8.3–10.6)
CHLORIDE SERPL-SCNC: 97 MMOL/L (ref 99–110)
CO2 SERPL-SCNC: 24 MMOL/L (ref 21–32)
CREAT SERPL-MCNC: <0.5 MG/DL (ref 0.6–1.2)
DEPRECATED RDW RBC AUTO: 14.9 % (ref 12.4–15.4)
EOSINOPHIL # BLD: 0 K/UL (ref 0–0.6)
EOSINOPHIL NFR BLD: 0 %
GFR SERPLBLD CREATININE-BSD FMLA CKD-EPI: >60 ML/MIN/{1.73_M2}
GLUCOSE SERPL-MCNC: 80 MG/DL (ref 70–99)
HCT VFR BLD AUTO: 37.3 % (ref 36–48)
HGB BLD-MCNC: 12.9 G/DL (ref 12–16)
LYMPHOCYTES # BLD: 2.1 K/UL (ref 1–5.1)
LYMPHOCYTES NFR BLD: 27.9 %
MCH RBC QN AUTO: 29.6 PG (ref 26–34)
MCHC RBC AUTO-ENTMCNC: 34.6 G/DL (ref 31–36)
MCV RBC AUTO: 85.6 FL (ref 80–100)
MONOCYTES # BLD: 0.9 K/UL (ref 0–1.3)
MONOCYTES NFR BLD: 11.7 %
NEUTROPHILS # BLD: 4.6 K/UL (ref 1.7–7.7)
NEUTROPHILS NFR BLD: 60.3 %
PLATELET # BLD AUTO: 252 K/UL (ref 135–450)
PMV BLD AUTO: 9.1 FL (ref 5–10.5)
POTASSIUM SERPL-SCNC: 3.6 MMOL/L (ref 3.5–5.1)
PROT SERPL-MCNC: 6.4 G/DL (ref 6.4–8.2)
RBC # BLD AUTO: 4.35 M/UL (ref 4–5.2)
SODIUM SERPL-SCNC: 132 MMOL/L (ref 136–145)
TSH SERPL DL<=0.005 MIU/L-ACNC: 0.69 UIU/ML (ref 0.27–4.2)
WBC # BLD AUTO: 7.7 K/UL (ref 4–11)

## 2024-03-22 PROCEDURE — 2580000003 HC RX 258: Performed by: STUDENT IN AN ORGANIZED HEALTH CARE EDUCATION/TRAINING PROGRAM

## 2024-03-22 PROCEDURE — 80053 COMPREHEN METABOLIC PANEL: CPT

## 2024-03-22 PROCEDURE — 84443 ASSAY THYROID STIM HORMONE: CPT

## 2024-03-22 PROCEDURE — 6370000000 HC RX 637 (ALT 250 FOR IP): Performed by: STUDENT IN AN ORGANIZED HEALTH CARE EDUCATION/TRAINING PROGRAM

## 2024-03-22 PROCEDURE — 93005 ELECTROCARDIOGRAM TRACING: CPT | Performed by: STUDENT IN AN ORGANIZED HEALTH CARE EDUCATION/TRAINING PROGRAM

## 2024-03-22 PROCEDURE — 94760 N-INVAS EAR/PLS OXIMETRY 1: CPT

## 2024-03-22 PROCEDURE — 36415 COLL VENOUS BLD VENIPUNCTURE: CPT

## 2024-03-22 PROCEDURE — 1200000000 HC SEMI PRIVATE

## 2024-03-22 PROCEDURE — 82140 ASSAY OF AMMONIA: CPT

## 2024-03-22 PROCEDURE — 94640 AIRWAY INHALATION TREATMENT: CPT

## 2024-03-22 PROCEDURE — 99223 1ST HOSP IP/OBS HIGH 75: CPT | Performed by: STUDENT IN AN ORGANIZED HEALTH CARE EDUCATION/TRAINING PROGRAM

## 2024-03-22 PROCEDURE — 6360000002 HC RX W HCPCS: Performed by: STUDENT IN AN ORGANIZED HEALTH CARE EDUCATION/TRAINING PROGRAM

## 2024-03-22 PROCEDURE — 76700 US EXAM ABDOM COMPLETE: CPT

## 2024-03-22 PROCEDURE — 85025 COMPLETE CBC W/AUTO DIFF WBC: CPT

## 2024-03-22 RX ORDER — SODIUM CHLORIDE 9 MG/ML
INJECTION, SOLUTION INTRAVENOUS PRN
Status: DISCONTINUED | OUTPATIENT
Start: 2024-03-22 | End: 2024-03-27 | Stop reason: HOSPADM

## 2024-03-22 RX ORDER — PANTOPRAZOLE SODIUM 40 MG/1
40 TABLET, DELAYED RELEASE ORAL
Status: DISCONTINUED | OUTPATIENT
Start: 2024-03-23 | End: 2024-03-27 | Stop reason: HOSPADM

## 2024-03-22 RX ORDER — ALBUTEROL SULFATE 90 UG/1
2 AEROSOL, METERED RESPIRATORY (INHALATION) EVERY 6 HOURS PRN
Status: DISCONTINUED | OUTPATIENT
Start: 2024-03-22 | End: 2024-03-27 | Stop reason: HOSPADM

## 2024-03-22 RX ORDER — MAGNESIUM SULFATE IN WATER 40 MG/ML
2000 INJECTION, SOLUTION INTRAVENOUS PRN
Status: DISCONTINUED | OUTPATIENT
Start: 2024-03-22 | End: 2024-03-27 | Stop reason: HOSPADM

## 2024-03-22 RX ORDER — AZELASTINE 1 MG/ML
2 SPRAY, METERED NASAL 2 TIMES DAILY
Status: DISCONTINUED | OUTPATIENT
Start: 2024-03-22 | End: 2024-03-27 | Stop reason: HOSPADM

## 2024-03-22 RX ORDER — FLUTICASONE PROPIONATE 110 UG/1
2 AEROSOL, METERED RESPIRATORY (INHALATION)
Status: DISCONTINUED | OUTPATIENT
Start: 2024-03-22 | End: 2024-03-27 | Stop reason: HOSPADM

## 2024-03-22 RX ORDER — TRIAMCINOLONE ACETONIDE 1 MG/G
CREAM TOPICAL 2 TIMES DAILY
Status: DISCONTINUED | OUTPATIENT
Start: 2024-03-22 | End: 2024-03-27 | Stop reason: HOSPADM

## 2024-03-22 RX ORDER — ENOXAPARIN SODIUM 100 MG/ML
40 INJECTION SUBCUTANEOUS DAILY
Status: DISCONTINUED | OUTPATIENT
Start: 2024-03-22 | End: 2024-03-22

## 2024-03-22 RX ORDER — ACETAMINOPHEN 325 MG/1
650 TABLET ORAL EVERY 6 HOURS PRN
Status: DISCONTINUED | OUTPATIENT
Start: 2024-03-22 | End: 2024-03-27 | Stop reason: HOSPADM

## 2024-03-22 RX ORDER — ENOXAPARIN SODIUM 100 MG/ML
30 INJECTION SUBCUTANEOUS DAILY
Status: DISCONTINUED | OUTPATIENT
Start: 2024-03-22 | End: 2024-03-27 | Stop reason: HOSPADM

## 2024-03-22 RX ORDER — POTASSIUM CHLORIDE 20 MEQ/1
40 TABLET, EXTENDED RELEASE ORAL PRN
Status: DISCONTINUED | OUTPATIENT
Start: 2024-03-22 | End: 2024-03-27 | Stop reason: HOSPADM

## 2024-03-22 RX ORDER — AMLODIPINE BESYLATE 5 MG/1
5 TABLET ORAL EVERY MORNING
Status: DISCONTINUED | OUTPATIENT
Start: 2024-03-23 | End: 2024-03-27 | Stop reason: HOSPADM

## 2024-03-22 RX ORDER — SODIUM CHLORIDE 0.9 % (FLUSH) 0.9 %
5-40 SYRINGE (ML) INJECTION EVERY 12 HOURS SCHEDULED
Status: DISCONTINUED | OUTPATIENT
Start: 2024-03-22 | End: 2024-03-27 | Stop reason: HOSPADM

## 2024-03-22 RX ORDER — POTASSIUM CHLORIDE 7.45 MG/ML
10 INJECTION INTRAVENOUS PRN
Status: DISCONTINUED | OUTPATIENT
Start: 2024-03-22 | End: 2024-03-27 | Stop reason: HOSPADM

## 2024-03-22 RX ORDER — SODIUM CHLORIDE 0.9 % (FLUSH) 0.9 %
5-40 SYRINGE (ML) INJECTION PRN
Status: DISCONTINUED | OUTPATIENT
Start: 2024-03-22 | End: 2024-03-27 | Stop reason: HOSPADM

## 2024-03-22 RX ORDER — POLYETHYLENE GLYCOL 3350 17 G/17G
17 POWDER, FOR SOLUTION ORAL DAILY PRN
Status: DISCONTINUED | OUTPATIENT
Start: 2024-03-22 | End: 2024-03-27 | Stop reason: HOSPADM

## 2024-03-22 RX ORDER — ONDANSETRON 2 MG/ML
4 INJECTION INTRAMUSCULAR; INTRAVENOUS EVERY 6 HOURS PRN
Status: DISCONTINUED | OUTPATIENT
Start: 2024-03-22 | End: 2024-03-27 | Stop reason: HOSPADM

## 2024-03-22 RX ORDER — ONDANSETRON 4 MG/1
4 TABLET, ORALLY DISINTEGRATING ORAL EVERY 8 HOURS PRN
Status: DISCONTINUED | OUTPATIENT
Start: 2024-03-22 | End: 2024-03-27 | Stop reason: HOSPADM

## 2024-03-22 RX ORDER — ACETAMINOPHEN 650 MG/1
650 SUPPOSITORY RECTAL EVERY 6 HOURS PRN
Status: DISCONTINUED | OUTPATIENT
Start: 2024-03-22 | End: 2024-03-27 | Stop reason: HOSPADM

## 2024-03-22 RX ADMIN — ENOXAPARIN SODIUM 30 MG: 100 INJECTION SUBCUTANEOUS at 20:30

## 2024-03-22 RX ADMIN — TRIAMCINOLONE ACETONIDE: 1 CREAM TOPICAL at 20:30

## 2024-03-22 RX ADMIN — Medication 2 PUFF: at 21:32

## 2024-03-22 RX ADMIN — AZELASTINE HYDROCHLORIDE 2 SPRAY: 137 SPRAY, METERED NASAL at 20:30

## 2024-03-22 RX ADMIN — Medication 10 ML: at 20:31

## 2024-03-23 ENCOUNTER — APPOINTMENT (OUTPATIENT)
Dept: MRI IMAGING | Age: 79
DRG: 885 | End: 2024-03-23
Attending: STUDENT IN AN ORGANIZED HEALTH CARE EDUCATION/TRAINING PROGRAM
Payer: MEDICARE

## 2024-03-23 LAB
ALBUMIN SERPL-MCNC: 3.8 G/DL (ref 3.4–5)
ALBUMIN/GLOB SERPL: 2 {RATIO} (ref 1.1–2.2)
ALP SERPL-CCNC: 47 U/L (ref 40–129)
ALT SERPL-CCNC: 50 U/L (ref 10–40)
AMPHETAMINES UR QL SCN>1000 NG/ML: NORMAL
ANA SER QL IA: NEGATIVE
ANION GAP SERPL CALCULATED.3IONS-SCNC: 16 MMOL/L (ref 3–16)
ANTI-THYROGLOB ABS: 14 IU/ML
APAP SERPL-MCNC: <5 UG/ML (ref 10–30)
AST SERPL-CCNC: 69 U/L (ref 15–37)
BARBITURATES UR QL SCN>200 NG/ML: NORMAL
BASOPHILS # BLD: 0 K/UL (ref 0–0.2)
BASOPHILS NFR BLD: 0.1 %
BENZODIAZ UR QL SCN>200 NG/ML: NORMAL
BILIRUB SERPL-MCNC: 0.8 MG/DL (ref 0–1)
BILIRUB UR QL STRIP.AUTO: NEGATIVE
BUN SERPL-MCNC: 8 MG/DL (ref 7–20)
CALCIUM SERPL-MCNC: 8.4 MG/DL (ref 8.3–10.6)
CANNABINOIDS UR QL SCN>50 NG/ML: NORMAL
CHLORIDE SERPL-SCNC: 101 MMOL/L (ref 99–110)
CLARITY UR: CLEAR
CO2 SERPL-SCNC: 22 MMOL/L (ref 21–32)
COCAINE UR QL SCN: NORMAL
COLOR UR: YELLOW
CORTIS AM PEAK SERPL-MCNC: 16.1 UG/DL (ref 4.3–22.4)
CREAT SERPL-MCNC: <0.5 MG/DL (ref 0.6–1.2)
CRP SERPL-MCNC: 5.1 MG/L (ref 0–5.1)
DEPRECATED RDW RBC AUTO: 14.9 % (ref 12.4–15.4)
DRUG SCREEN COMMENT UR-IMP: NORMAL
EOSINOPHIL # BLD: 0 K/UL (ref 0–0.6)
EOSINOPHIL NFR BLD: 0 %
ERYTHROCYTE [SEDIMENTATION RATE] IN BLOOD BY WESTERGREN METHOD: 4 MM/HR (ref 0–30)
FENTANYL SCREEN, URINE: NORMAL
FERRITIN SERPL IA-MCNC: 84.4 NG/ML (ref 15–150)
FOLATE SERPL-MCNC: >20 NG/ML (ref 4.78–24.2)
GFR SERPLBLD CREATININE-BSD FMLA CKD-EPI: >60 ML/MIN/{1.73_M2}
GLUCOSE BLD-MCNC: 84 MG/DL (ref 70–99)
GLUCOSE SERPL-MCNC: 49 MG/DL (ref 70–99)
GLUCOSE UR STRIP.AUTO-MCNC: NEGATIVE MG/DL
HAV IGM SERPL QL IA: NORMAL
HBV CORE IGM SERPL QL IA: NORMAL
HBV SURFACE AG SERPL QL IA: NORMAL
HCT VFR BLD AUTO: 35.5 % (ref 36–48)
HCV AB SERPL QL IA: NORMAL
HGB BLD-MCNC: 12.2 G/DL (ref 12–16)
HGB UR QL STRIP.AUTO: NEGATIVE
IRON SATN MFR SERPL: 25 % (ref 15–50)
IRON SERPL-MCNC: 64 UG/DL (ref 37–145)
KETONES UR STRIP.AUTO-MCNC: 40 MG/DL
LEUKOCYTE ESTERASE UR QL STRIP.AUTO: NEGATIVE
LYMPHOCYTES # BLD: 1.5 K/UL (ref 1–5.1)
LYMPHOCYTES NFR BLD: 25.1 %
MAGNESIUM SERPL-MCNC: 1.8 MG/DL (ref 1.8–2.4)
MCH RBC QN AUTO: 29.8 PG (ref 26–34)
MCHC RBC AUTO-ENTMCNC: 34.4 G/DL (ref 31–36)
MCV RBC AUTO: 86.7 FL (ref 80–100)
METHADONE UR QL SCN>300 NG/ML: NORMAL
MONOCYTES # BLD: 0.8 K/UL (ref 0–1.3)
MONOCYTES NFR BLD: 13.2 %
NEUTROPHILS # BLD: 3.8 K/UL (ref 1.7–7.7)
NEUTROPHILS NFR BLD: 61.6 %
NITRITE UR QL STRIP.AUTO: NEGATIVE
OPIATES UR QL SCN>300 NG/ML: NORMAL
OXYCODONE UR QL SCN: NORMAL
PCP UR QL SCN>25 NG/ML: NORMAL
PERFORMED ON: NORMAL
PH UR STRIP.AUTO: 6.5 [PH] (ref 5–8)
PH UR STRIP: 7 [PH]
PLATELET # BLD AUTO: 249 K/UL (ref 135–450)
PMV BLD AUTO: 9.5 FL (ref 5–10.5)
POTASSIUM SERPL-SCNC: 3.6 MMOL/L (ref 3.5–5.1)
PROT SERPL-MCNC: 5.7 G/DL (ref 6.4–8.2)
PROT UR STRIP.AUTO-MCNC: NEGATIVE MG/DL
RBC # BLD AUTO: 4.1 M/UL (ref 4–5.2)
REAGIN+T PALLIDUM IGG+IGM SERPL-IMP: NORMAL
SODIUM SERPL-SCNC: 139 MMOL/L (ref 136–145)
SP GR UR STRIP.AUTO: 1 (ref 1–1.03)
THYROPEROXIDASE AB SERPL IA-ACNC: <5 IU/ML
TIBC SERPL-MCNC: 259 UG/DL (ref 260–445)
TSH SERPL DL<=0.005 MIU/L-ACNC: 0.57 UIU/ML (ref 0.27–4.2)
UA COMPLETE W REFLEX CULTURE PNL UR: ABNORMAL
UA DIPSTICK W REFLEX MICRO PNL UR: ABNORMAL
URN SPEC COLLECT METH UR: ABNORMAL
UROBILINOGEN UR STRIP-ACNC: 1 E.U./DL
VIT B12 SERPL-MCNC: 1847 PG/ML (ref 211–911)
WBC # BLD AUTO: 6.2 K/UL (ref 4–11)

## 2024-03-23 PROCEDURE — 6360000004 HC RX CONTRAST MEDICATION: Performed by: NURSE PRACTITIONER

## 2024-03-23 PROCEDURE — 70553 MRI BRAIN STEM W/O & W/DYE: CPT

## 2024-03-23 PROCEDURE — 82746 ASSAY OF FOLIC ACID SERUM: CPT

## 2024-03-23 PROCEDURE — 82105 ALPHA-FETOPROTEIN SERUM: CPT

## 2024-03-23 PROCEDURE — 82728 ASSAY OF FERRITIN: CPT

## 2024-03-23 PROCEDURE — 86038 ANTINUCLEAR ANTIBODIES: CPT

## 2024-03-23 PROCEDURE — 85025 COMPLETE CBC W/AUTO DIFF WBC: CPT

## 2024-03-23 PROCEDURE — 99232 SBSQ HOSP IP/OBS MODERATE 35: CPT | Performed by: INTERNAL MEDICINE

## 2024-03-23 PROCEDURE — 83516 IMMUNOASSAY NONANTIBODY: CPT

## 2024-03-23 PROCEDURE — 86376 MICROSOMAL ANTIBODY EACH: CPT

## 2024-03-23 PROCEDURE — 97116 GAIT TRAINING THERAPY: CPT

## 2024-03-23 PROCEDURE — 97166 OT EVAL MOD COMPLEX 45 MIN: CPT

## 2024-03-23 PROCEDURE — 82607 VITAMIN B-12: CPT

## 2024-03-23 PROCEDURE — 86140 C-REACTIVE PROTEIN: CPT

## 2024-03-23 PROCEDURE — 86015 ACTIN ANTIBODY EACH: CPT

## 2024-03-23 PROCEDURE — 80074 ACUTE HEPATITIS PANEL: CPT

## 2024-03-23 PROCEDURE — 80053 COMPREHEN METABOLIC PANEL: CPT

## 2024-03-23 PROCEDURE — 82533 TOTAL CORTISOL: CPT

## 2024-03-23 PROCEDURE — 83550 IRON BINDING TEST: CPT

## 2024-03-23 PROCEDURE — 6370000000 HC RX 637 (ALT 250 FOR IP): Performed by: STUDENT IN AN ORGANIZED HEALTH CARE EDUCATION/TRAINING PROGRAM

## 2024-03-23 PROCEDURE — 6360000002 HC RX W HCPCS: Performed by: STUDENT IN AN ORGANIZED HEALTH CARE EDUCATION/TRAINING PROGRAM

## 2024-03-23 PROCEDURE — 85652 RBC SED RATE AUTOMATED: CPT

## 2024-03-23 PROCEDURE — 86800 THYROGLOBULIN ANTIBODY: CPT

## 2024-03-23 PROCEDURE — 97530 THERAPEUTIC ACTIVITIES: CPT

## 2024-03-23 PROCEDURE — 82390 ASSAY OF CERULOPLASMIN: CPT

## 2024-03-23 PROCEDURE — 81003 URINALYSIS AUTO W/O SCOPE: CPT

## 2024-03-23 PROCEDURE — 36415 COLL VENOUS BLD VENIPUNCTURE: CPT

## 2024-03-23 PROCEDURE — A9579 GAD-BASE MR CONTRAST NOS,1ML: HCPCS | Performed by: NURSE PRACTITIONER

## 2024-03-23 PROCEDURE — 80143 DRUG ASSAY ACETAMINOPHEN: CPT

## 2024-03-23 PROCEDURE — 97162 PT EVAL MOD COMPLEX 30 MIN: CPT

## 2024-03-23 PROCEDURE — 80307 DRUG TEST PRSMV CHEM ANLYZR: CPT

## 2024-03-23 PROCEDURE — 97535 SELF CARE MNGMENT TRAINING: CPT

## 2024-03-23 PROCEDURE — 83540 ASSAY OF IRON: CPT

## 2024-03-23 PROCEDURE — 82103 ALPHA-1-ANTITRYPSIN TOTAL: CPT

## 2024-03-23 PROCEDURE — 86780 TREPONEMA PALLIDUM: CPT

## 2024-03-23 PROCEDURE — 2580000003 HC RX 258: Performed by: STUDENT IN AN ORGANIZED HEALTH CARE EDUCATION/TRAINING PROGRAM

## 2024-03-23 PROCEDURE — 1200000000 HC SEMI PRIVATE

## 2024-03-23 PROCEDURE — 94640 AIRWAY INHALATION TREATMENT: CPT

## 2024-03-23 PROCEDURE — 83735 ASSAY OF MAGNESIUM: CPT

## 2024-03-23 PROCEDURE — 84443 ASSAY THYROID STIM HORMONE: CPT

## 2024-03-23 PROCEDURE — 6370000000 HC RX 637 (ALT 250 FOR IP): Performed by: INTERNAL MEDICINE

## 2024-03-23 RX ORDER — PREDNISONE 5 MG/1
20 TABLET ORAL SEE ADMIN INSTRUCTIONS
Status: ON HOLD | COMMUNITY
End: 2024-03-26 | Stop reason: HOSPADM

## 2024-03-23 RX ORDER — LANOLIN ALCOHOL/MO/W.PET/CERES
6 CREAM (GRAM) TOPICAL NIGHTLY
Status: DISCONTINUED | OUTPATIENT
Start: 2024-03-23 | End: 2024-03-27 | Stop reason: HOSPADM

## 2024-03-23 RX ADMIN — ENOXAPARIN SODIUM 30 MG: 100 INJECTION SUBCUTANEOUS at 08:24

## 2024-03-23 RX ADMIN — Medication 3 MG: at 20:41

## 2024-03-23 RX ADMIN — AMLODIPINE BESYLATE 5 MG: 5 TABLET ORAL at 08:24

## 2024-03-23 RX ADMIN — GADOTERIDOL 9 ML: 279.3 INJECTION, SOLUTION INTRAVENOUS at 16:15

## 2024-03-23 RX ADMIN — Medication 10 ML: at 20:47

## 2024-03-23 RX ADMIN — TRIAMCINOLONE ACETONIDE: 1 CREAM TOPICAL at 09:00

## 2024-03-23 RX ADMIN — AZELASTINE HYDROCHLORIDE 2 SPRAY: 137 SPRAY, METERED NASAL at 20:43

## 2024-03-23 RX ADMIN — AZELASTINE HYDROCHLORIDE 2 SPRAY: 137 SPRAY, METERED NASAL at 09:00

## 2024-03-23 RX ADMIN — Medication 2 PUFF: at 09:04

## 2024-03-23 NOTE — H&P
PARANASAL SINUSES/MASTOIDS: Previous antral window placement.  Otherwise unremarkable.   BONES:  Unremarkable   OTHER: No significant intraorbital abnormality         Assessment & Plan:    Yvonne Carcamo is a 78 y.o. female who was admitted with Visual hallucinations.  Principal Problem:  Acute psychosis  Recent COVID-19 infection  Visual hallucinations?  - She has new onset persecutory delusions family reports that she was having visual hallucinations  - She had an unremarkable CT of her head yesterday, add MRI brain  - EEG  - Hold medicines that may be contributing including tramadol.  She has taken antihistamines including Benadryl outpatient, hold these as well.  - Appreciate neurology consultation  - Pending neurology evaluation may have psychiatry to see patient but she has no prior psychiatric history  - obtain UA    Elevated LFTs  - Hepatitis panel, ultrasound abdomen    Asthma  - She is on Fasenra outpatient and inhaler therapy.  Her asthma has been controlled well recently.    GERD  - Continue PPI    Hypertension  - Continue home therapy    Restless legs  - Has been on tramadol after failing other outpatient therapy, hold tramadol    Mitral valve regurgitation  - Asymptomatic    Generalized pruritus  - Was considering starting Dupixent outpatient    F/E/N: Regular  PPx: Lovenox  Dispo: Confusion evaluation    Michael Vicente MD   3/22/2024 8:28 PM    Documentation was done using voice recognition dragon software.  Every effort was made to ensure accuracy; however, inadvertent unintentional computerized transcription errors may be present.

## 2024-03-24 LAB
ALBUMIN SERPL-MCNC: 4 G/DL (ref 3.4–5)
ALBUMIN/GLOB SERPL: 1.7 {RATIO} (ref 1.1–2.2)
ALP SERPL-CCNC: 51 U/L (ref 40–129)
ALT SERPL-CCNC: 50 U/L (ref 10–40)
ANION GAP SERPL CALCULATED.3IONS-SCNC: 13 MMOL/L (ref 3–16)
AST SERPL-CCNC: 57 U/L (ref 15–37)
BASOPHILS # BLD: 0 K/UL (ref 0–0.2)
BASOPHILS NFR BLD: 0.5 %
BILIRUB SERPL-MCNC: 0.6 MG/DL (ref 0–1)
BUN SERPL-MCNC: 7 MG/DL (ref 7–20)
CALCIUM SERPL-MCNC: 9.1 MG/DL (ref 8.3–10.6)
CHLORIDE SERPL-SCNC: 104 MMOL/L (ref 99–110)
CO2 SERPL-SCNC: 22 MMOL/L (ref 21–32)
CREAT SERPL-MCNC: <0.5 MG/DL (ref 0.6–1.2)
DEPRECATED RDW RBC AUTO: 14.9 % (ref 12.4–15.4)
EKG ATRIAL RATE: 86 BPM
EKG DIAGNOSIS: NORMAL
EKG P AXIS: 29 DEGREES
EKG P-R INTERVAL: 144 MS
EKG Q-T INTERVAL: 354 MS
EKG QRS DURATION: 64 MS
EKG QTC CALCULATION (BAZETT): 423 MS
EKG R AXIS: 21 DEGREES
EKG T AXIS: 39 DEGREES
EKG VENTRICULAR RATE: 86 BPM
EOSINOPHIL # BLD: 0 K/UL (ref 0–0.6)
EOSINOPHIL NFR BLD: 0 %
GFR SERPLBLD CREATININE-BSD FMLA CKD-EPI: >60 ML/MIN/{1.73_M2}
GLUCOSE SERPL-MCNC: 84 MG/DL (ref 70–99)
HCT VFR BLD AUTO: 38.9 % (ref 36–48)
HGB BLD-MCNC: 13.4 G/DL (ref 12–16)
LYMPHOCYTES # BLD: 1.8 K/UL (ref 1–5.1)
LYMPHOCYTES NFR BLD: 27.5 %
MCH RBC QN AUTO: 29.7 PG (ref 26–34)
MCHC RBC AUTO-ENTMCNC: 34.4 G/DL (ref 31–36)
MCV RBC AUTO: 86.5 FL (ref 80–100)
MONOCYTES # BLD: 0.8 K/UL (ref 0–1.3)
MONOCYTES NFR BLD: 12 %
NEUTROPHILS # BLD: 4 K/UL (ref 1.7–7.7)
NEUTROPHILS NFR BLD: 60 %
PLATELET # BLD AUTO: 280 K/UL (ref 135–450)
PMV BLD AUTO: 9.3 FL (ref 5–10.5)
POTASSIUM SERPL-SCNC: 3.9 MMOL/L (ref 3.5–5.1)
PROT SERPL-MCNC: 6.4 G/DL (ref 6.4–8.2)
RBC # BLD AUTO: 4.5 M/UL (ref 4–5.2)
SODIUM SERPL-SCNC: 139 MMOL/L (ref 136–145)
WBC # BLD AUTO: 6.7 K/UL (ref 4–11)

## 2024-03-24 PROCEDURE — 1200000000 HC SEMI PRIVATE

## 2024-03-24 PROCEDURE — 94640 AIRWAY INHALATION TREATMENT: CPT

## 2024-03-24 PROCEDURE — 2580000003 HC RX 258: Performed by: STUDENT IN AN ORGANIZED HEALTH CARE EDUCATION/TRAINING PROGRAM

## 2024-03-24 PROCEDURE — 36415 COLL VENOUS BLD VENIPUNCTURE: CPT

## 2024-03-24 PROCEDURE — 6370000000 HC RX 637 (ALT 250 FOR IP): Performed by: STUDENT IN AN ORGANIZED HEALTH CARE EDUCATION/TRAINING PROGRAM

## 2024-03-24 PROCEDURE — 85025 COMPLETE CBC W/AUTO DIFF WBC: CPT

## 2024-03-24 PROCEDURE — 84425 ASSAY OF VITAMIN B-1: CPT

## 2024-03-24 PROCEDURE — 6360000002 HC RX W HCPCS: Performed by: STUDENT IN AN ORGANIZED HEALTH CARE EDUCATION/TRAINING PROGRAM

## 2024-03-24 PROCEDURE — 6370000000 HC RX 637 (ALT 250 FOR IP): Performed by: INTERNAL MEDICINE

## 2024-03-24 PROCEDURE — 99232 SBSQ HOSP IP/OBS MODERATE 35: CPT | Performed by: INTERNAL MEDICINE

## 2024-03-24 PROCEDURE — 80053 COMPREHEN METABOLIC PANEL: CPT

## 2024-03-24 PROCEDURE — 93010 ELECTROCARDIOGRAM REPORT: CPT | Performed by: INTERNAL MEDICINE

## 2024-03-24 PROCEDURE — 94760 N-INVAS EAR/PLS OXIMETRY 1: CPT

## 2024-03-24 RX ADMIN — PANTOPRAZOLE SODIUM 40 MG: 40 TABLET, DELAYED RELEASE ORAL at 07:31

## 2024-03-24 RX ADMIN — Medication 10 ML: at 09:31

## 2024-03-24 RX ADMIN — Medication 2 PUFF: at 08:44

## 2024-03-24 RX ADMIN — ENOXAPARIN SODIUM 30 MG: 100 INJECTION SUBCUTANEOUS at 09:30

## 2024-03-24 RX ADMIN — AZELASTINE HYDROCHLORIDE 2 SPRAY: 137 SPRAY, METERED NASAL at 09:31

## 2024-03-24 RX ADMIN — TRIAMCINOLONE ACETONIDE: 1 CREAM TOPICAL at 09:30

## 2024-03-24 RX ADMIN — Medication 6 MG: at 20:34

## 2024-03-24 RX ADMIN — AMLODIPINE BESYLATE 5 MG: 5 TABLET ORAL at 09:30

## 2024-03-24 RX ADMIN — Medication 10 ML: at 20:27

## 2024-03-24 ASSESSMENT — PAIN SCALES - GENERAL
PAINLEVEL_OUTOF10: 0
PAINLEVEL_OUTOF10: 0

## 2024-03-25 PROBLEM — R41.0 DISORIENTATION: Status: ACTIVE | Noted: 2024-03-25

## 2024-03-25 PROBLEM — R41.89 DISORGANIZED THOUGHT PROCESS: Status: ACTIVE | Noted: 2024-03-25

## 2024-03-25 LAB
ALBUMIN SERPL-MCNC: 3.8 G/DL (ref 3.4–5)
ALBUMIN/GLOB SERPL: 2.1 {RATIO} (ref 1.1–2.2)
ALP SERPL-CCNC: 44 U/L (ref 40–129)
ALT SERPL-CCNC: 41 U/L (ref 10–40)
ANION GAP SERPL CALCULATED.3IONS-SCNC: 9 MMOL/L (ref 3–16)
AST SERPL-CCNC: 44 U/L (ref 15–37)
BASOPHILS # BLD: 0 K/UL (ref 0–0.2)
BASOPHILS NFR BLD: 0.1 %
BILIRUB SERPL-MCNC: 0.4 MG/DL (ref 0–1)
BUN SERPL-MCNC: 10 MG/DL (ref 7–20)
CALCIUM SERPL-MCNC: 8.7 MG/DL (ref 8.3–10.6)
CHLORIDE SERPL-SCNC: 107 MMOL/L (ref 99–110)
CO2 SERPL-SCNC: 27 MMOL/L (ref 21–32)
CREAT SERPL-MCNC: <0.5 MG/DL (ref 0.6–1.2)
DEPRECATED RDW RBC AUTO: 15.2 % (ref 12.4–15.4)
EOSINOPHIL # BLD: 0 K/UL (ref 0–0.6)
EOSINOPHIL NFR BLD: 0 %
GFR SERPLBLD CREATININE-BSD FMLA CKD-EPI: >60 ML/MIN/{1.73_M2}
GLUCOSE SERPL-MCNC: 77 MG/DL (ref 70–99)
HCT VFR BLD AUTO: 36.5 % (ref 36–48)
HGB BLD-MCNC: 12.5 G/DL (ref 12–16)
INR PPP: 0.99 (ref 0.84–1.16)
LYMPHOCYTES # BLD: 1.6 K/UL (ref 1–5.1)
LYMPHOCYTES NFR BLD: 31.6 %
MCH RBC QN AUTO: 29.6 PG (ref 26–34)
MCHC RBC AUTO-ENTMCNC: 34.1 G/DL (ref 31–36)
MCV RBC AUTO: 86.7 FL (ref 80–100)
MONOCYTES # BLD: 0.7 K/UL (ref 0–1.3)
MONOCYTES NFR BLD: 15.1 %
NEUTROPHILS # BLD: 2.6 K/UL (ref 1.7–7.7)
NEUTROPHILS NFR BLD: 53.2 %
PLATELET # BLD AUTO: 254 K/UL (ref 135–450)
PMV BLD AUTO: 9.4 FL (ref 5–10.5)
POTASSIUM SERPL-SCNC: 3.6 MMOL/L (ref 3.5–5.1)
PROT SERPL-MCNC: 5.6 G/DL (ref 6.4–8.2)
PROTHROMBIN TIME: 13.1 SEC (ref 11.5–14.8)
RBC # BLD AUTO: 4.21 M/UL (ref 4–5.2)
SODIUM SERPL-SCNC: 143 MMOL/L (ref 136–145)
WBC # BLD AUTO: 4.9 K/UL (ref 4–11)

## 2024-03-25 PROCEDURE — 94760 N-INVAS EAR/PLS OXIMETRY 1: CPT

## 2024-03-25 PROCEDURE — 6360000002 HC RX W HCPCS: Performed by: STUDENT IN AN ORGANIZED HEALTH CARE EDUCATION/TRAINING PROGRAM

## 2024-03-25 PROCEDURE — 86701 HIV-1ANTIBODY: CPT

## 2024-03-25 PROCEDURE — 36415 COLL VENOUS BLD VENIPUNCTURE: CPT

## 2024-03-25 PROCEDURE — 85610 PROTHROMBIN TIME: CPT

## 2024-03-25 PROCEDURE — 1200000000 HC SEMI PRIVATE

## 2024-03-25 PROCEDURE — 99233 SBSQ HOSP IP/OBS HIGH 50: CPT | Performed by: STUDENT IN AN ORGANIZED HEALTH CARE EDUCATION/TRAINING PROGRAM

## 2024-03-25 PROCEDURE — 6370000000 HC RX 637 (ALT 250 FOR IP): Performed by: INTERNAL MEDICINE

## 2024-03-25 PROCEDURE — 85025 COMPLETE CBC W/AUTO DIFF WBC: CPT

## 2024-03-25 PROCEDURE — 80053 COMPREHEN METABOLIC PANEL: CPT

## 2024-03-25 PROCEDURE — 95819 EEG AWAKE AND ASLEEP: CPT

## 2024-03-25 PROCEDURE — 87390 HIV-1 AG IA: CPT

## 2024-03-25 PROCEDURE — 86702 HIV-2 ANTIBODY: CPT

## 2024-03-25 PROCEDURE — 6370000000 HC RX 637 (ALT 250 FOR IP): Performed by: STUDENT IN AN ORGANIZED HEALTH CARE EDUCATION/TRAINING PROGRAM

## 2024-03-25 PROCEDURE — 99222 1ST HOSP IP/OBS MODERATE 55: CPT | Performed by: REGISTERED NURSE

## 2024-03-25 RX ORDER — RISPERIDONE 0.5 MG/1
0.5 TABLET, ORALLY DISINTEGRATING ORAL NIGHTLY
Status: DISCONTINUED | OUTPATIENT
Start: 2024-03-25 | End: 2024-03-26

## 2024-03-25 RX ORDER — RISPERIDONE 0.5 MG/1
0.25 TABLET, ORALLY DISINTEGRATING ORAL EVERY MORNING
Status: DISCONTINUED | OUTPATIENT
Start: 2024-03-26 | End: 2024-03-26

## 2024-03-25 RX ORDER — SODIUM CHLORIDE 9 MG/ML
INJECTION, SOLUTION INTRAVENOUS PRN
Status: DISCONTINUED | OUTPATIENT
Start: 2024-03-25 | End: 2024-03-27 | Stop reason: HOSPADM

## 2024-03-25 RX ORDER — SODIUM CHLORIDE 0.9 % (FLUSH) 0.9 %
5-40 SYRINGE (ML) INJECTION PRN
Status: DISCONTINUED | OUTPATIENT
Start: 2024-03-25 | End: 2024-03-27 | Stop reason: HOSPADM

## 2024-03-25 RX ORDER — SODIUM CHLORIDE 0.9 % (FLUSH) 0.9 %
5-40 SYRINGE (ML) INJECTION EVERY 12 HOURS SCHEDULED
Status: DISCONTINUED | OUTPATIENT
Start: 2024-03-25 | End: 2024-03-27 | Stop reason: HOSPADM

## 2024-03-25 RX ADMIN — TRIAMCINOLONE ACETONIDE: 1 CREAM TOPICAL at 09:05

## 2024-03-25 RX ADMIN — Medication 6 MG: at 20:59

## 2024-03-25 RX ADMIN — AMLODIPINE BESYLATE 5 MG: 5 TABLET ORAL at 14:02

## 2024-03-25 RX ADMIN — AZELASTINE HYDROCHLORIDE 2 SPRAY: 137 SPRAY, METERED NASAL at 20:55

## 2024-03-25 RX ADMIN — AZELASTINE HYDROCHLORIDE 2 SPRAY: 137 SPRAY, METERED NASAL at 08:05

## 2024-03-25 RX ADMIN — TRIAMCINOLONE ACETONIDE: 1 CREAM TOPICAL at 20:56

## 2024-03-25 RX ADMIN — PANTOPRAZOLE SODIUM 40 MG: 40 TABLET, DELAYED RELEASE ORAL at 06:20

## 2024-03-25 RX ADMIN — ENOXAPARIN SODIUM 30 MG: 100 INJECTION SUBCUTANEOUS at 14:01

## 2024-03-25 ASSESSMENT — PAIN SCALES - WONG BAKER
WONGBAKER_NUMERICALRESPONSE: NO HURT

## 2024-03-25 ASSESSMENT — PAIN SCALES - GENERAL: PAINLEVEL_OUTOF10: 0

## 2024-03-25 NOTE — CONSULTS
PSYCHIATRY CONSULT, INITIAL EVALUATION      ReReferring Provider:  Michael Vicente MD    CC/Reason for Consult: Hallucinations    HPI:   context: Yvonne Carcamo is a 78 y.o. female  with  has a past medical history of Arthritis, Bronchial asthma, GERD (gastroesophageal reflux disease), Hyperlipidemia, Hypertension, Mild tricuspid regurgitation, Moderate mitral regurgitation, Osteopenia, Prolonged emergence from general anesthesia, RLS (restless legs syndrome), and Seasonal allergies who presented with worsening confusion, paranoia, and hallucinations x 4 days. She had COVID infection last month.  associated symptoms: Patient has been engaging  in odd behavior per documented chart review( such as rubbing all color off medications because because she thinks she is allergic to colored dyes, people out to get her,  need for sleep, increased energy -than her usual)- cleaning a lot in house, FOI, rambling speech, and disorganized thoughts process. During my visit- patient is alert and oriented to self, place and person. She denies hallucinations today. But she has been seeing things around room for days.  She is hyper focus on little things in room ( like wires, and other furniture) and questions why things are put in certain way. Daughter ( Cecelia) is at bed side. Daughter states patient was living with her , live in her normal life until 5-6 days ago. Daughter states one of her provider in a community started patient on Methotrexate last week other wise no change in medications. Patient has been sleeping well at all. At times she only sleeps for 2 hours and wakes up- wonders around. She normally sleeps 8-10 hours a night. She lost her sense of taste for food following COVID infection though today she ate all her lunch per patient. Daughter states she lost about 15 lbs after COVID infection. Pt reports having \" Brain fog\" since COVID infection. Patient denies any hx psychiatric disorders. She thinks 
    Substance and Sexual Activity   Alcohol Use No       ROS:  Constitutional- No weight loss.  No fevers.  Eyes- No diplopia. No photophobia.  Ears/nose/throat- No dysphagia. No dysarthria.  Cardiovascular- No palpitations. No chest pain.  Respiratory- No dyspnea. No cough.  Gastrointestinal- No abdominal pain. No nausea or vomiting.  Genitourinary- No incontinence. No urinary retention.  Musculoskeletal- +Arthritis both hands  Skin- No rash. No easy bruising.  Psychiatric- No depression. No anxiety.  Endocrine- No diabetes. No thyroid issues.  Hematologic- No bleeding difficulty. No fatigue.  Neurologic- No weakness. No headache.    EXAM:  Vitals:    03/22/24 2130 03/23/24 0745 03/23/24 0824 03/23/24 0904   BP:  123/65 123/65    Pulse:  84     Resp:  16     Temp:  97.4 °F (36.3 °C)     TempSrc:  Oral     SpO2: 97% 98%  97%      Constitutional   Vital signs: BP, HR, temperature, and RR reviewed   General: Awake and alert.  In no apparent distress.  Eyes: Unable to visualize the fundi.  Cardiovascular: Pulses symmetric in all 4 extremities.  No peripheral edema.  Psychiatric: Cooperative with examination. Somewhat delusional and paranoid.  Neurologic  Mental status:   Orientation: Person, place, time and situation   General fund of knowledge:  Grossly normal.   Memory: Intact to conversation.   Attention: Attends well to the exam.    Language: Fluent in conversation, but speech is disorganized.   Comprehension:  Follows commands x4.  Cranial nerves:   CN2: Visual fields full.  CN 3,4,6: Extraocular movements intact. No ptosis.  No nystagmus.  CN5: V1: V2: V3: Intact to light touch in all distributions.  CN7: Upper and lower facial symmetry without dysarthria.  CN8:  Hearing grossly intact to conversation.  CN9/10: Palate elevated symmetrically  CN11: Trap full strength on shoulder shrug bilaterally.  SCM without weakness.  CN12: Tongue midline with protrusion. Able to move tongue side to side.   Motor/Strength:

## 2024-03-25 NOTE — ACP (ADVANCE CARE PLANNING)
Advance Care Planning     Advance Care Planning Activator (Inpatient)  Conversation Note      Date of ACP Conversation: 3/25/2024     Conversation Conducted with: Patient with Decision Making Capacity    ACP Activator: ALLI Kerns      Health Care Decision Maker:     Current Designated Health Care Decision Maker:     Primary Decision Maker: [place],Jon - Spouse - [phone]    Today we documented Decision Maker(s) consistent with Legal Next of Kin hierarchy.    Care Preferences    Ventilation:  \"If you were in your present state of health and suddenly became very ill and were unable to breathe on your own, what would your preference be about the use of a ventilator (breathing machine) if it were available to you?\"      Would the patient desire the use of ventilator (breathing machine)?: yes    \"If your health worsens and it becomes clear that your chance of recovery is unlikely, what would your preference be about the use of a ventilator (breathing machine) if it were available to you?\"     Would the patient desire the use of ventilator (breathing machine)?: Yes      Resuscitation  \"CPR works best to restart the heart when there is a sudden event, like a heart attack, in someone who is otherwise healthy. Unfortunately, CPR does not typically restart the heart for people who have serious health conditions or who are very sick.\"    \"In the event your heart stopped as a result of an underlying serious health condition, would you want attempts to be made to restart your heart (answer \"yes\" for attempt to resuscitate) or would you prefer a natural death (answer \"no\" for do not attempt to resuscitate)?\" yes       [] Yes   [] No   Educated Patient / Decision Maker regarding differences between Advance Directives and portable DNR orders.    Length of ACP Conversation in minutes: 2 min     Conversation Outcomes:  ACP discussion completed    Follow-up plan:    [] Schedule follow-up conversation to continue

## 2024-03-25 NOTE — CARE COORDINATION
Case Management Assessment  Initial Evaluation    Date/Time of Evaluation: 3/25/2024 1:02 PM  Assessment Completed by: ALLI Kerns    If patient is discharged prior to next notation, then this note serves as note for discharge by case management.    Patient Name: Yvonne Carcamo                   YOB: 1945  Diagnosis: Visual hallucinations [R44.1]  Hallucinations [R44.3]                   Date / Time: 3/22/2024  5:05 PM    Patient Admission Status: Inpatient   Readmission Risk (Low < 19, Mod (19-27), High > 27): Readmission Risk Score: 8.6    Current PCP: Michael Vicente MD  PCP verified by CM? Yes    Chart Reviewed: Yes      History Provided by: Patient  Patient Orientation: Alert and Oriented    Patient Cognition: Other (see comment) (AMS. hallucinations)    Hospitalization in the last 30 days (Readmission):  No    If yes, Readmission Assessment in CM Navigator will be completed.    Advance Directives:      Code Status: Full Code   Patient's Primary Decision Maker is: Legal Next of Kin    Primary Decision Maker: Baltazar Carcamo - Spouse - [phone]    Discharge Planning:    Patient lives with: Spouse/Significant Other Type of Home: House  Primary Care Giver: Self  Patient Support Systems include: Spouse/Significant Other   Current Financial resources: None  Current community resources: None  Current services prior to admission: None            Current DME:              Type of Home Care services:  None    ADLS  Prior functional level: Independent in ADLs/IADLs  Current functional level: Independent in ADLs/IADLs    PT AM-PAC: 19 /24  OT AM-PAC: 20 /24    Family can provide assistance at DC: Yes  Would you like Case Management to discuss the discharge plan with any other family members/significant others, and if so, who? Yes (spouse)  Plans to Return to Present Housing: Unknown at present  Other Identified Issues/Barriers to RETURNING to current housing: neurology concerns, AMS  Potential

## 2024-03-25 NOTE — PROCEDURES
Name: Yvonne Carcamo   : 1945   Interpreting Physician: Jen Torres MD   Referring Physician: Michael Vicente MD   Date of [id number]      Clinical History:Altered mental status    Current Antiepileptic Medications: Current Facility-Administered Medications: sodium chloride flush 0.9 % injection 5-40 mL, 5-40 mL, IntraVENous, 2 times per day  sodium chloride flush 0.9 % injection 5-40 mL, 5-40 mL, IntraVENous, PRN  0.9 % sodium chloride infusion, , IntraVENous, PRN  risperiDONE (RISPERDAL M-TABS) disintegrating tablet 0.5 mg, 0.5 mg, Oral, Nightly  [START ON 3/26/2024] risperiDONE (RISPERDAL M-TABS) disintegrating tablet 0.25 mg, 0.25 mg, Oral, QAM  melatonin tablet 6 mg, 6 mg, Oral, Nightly  albuterol sulfate HFA (PROVENTIL;VENTOLIN;PROAIR) 108 (90 Base) MCG/ACT inhaler 2 puff, 2 puff, Inhalation, Q6H PRN  fluticasone (FLOVENT HFA) 110 MCG/ACT inhaler 2 puff, 2 puff, Inhalation, BID RT  amLODIPine (NORVASC) tablet 5 mg, 5 mg, Oral, QAM  azelastine (ASTELIN) 0.1 % nasal spray 2 spray, 2 spray, Each Nostril, BID  triamcinolone (KENALOG) 0.1 % cream, , Topical, BID  pantoprazole (PROTONIX) tablet 40 mg, 40 mg, Oral, QAM AC  sodium chloride flush 0.9 % injection 5-40 mL, 5-40 mL, IntraVENous, 2 times per day  sodium chloride flush 0.9 % injection 5-40 mL, 5-40 mL, IntraVENous, PRN  0.9 % sodium chloride infusion, , IntraVENous, PRN  potassium chloride (KLOR-CON M) extended release tablet 40 mEq, 40 mEq, Oral, PRN **OR** potassium bicarb-citric acid (EFFER-K) effervescent tablet 40 mEq, 40 mEq, Oral, PRN **OR** potassium chloride 10 mEq/100 mL IVPB (Peripheral Line), 10 mEq, IntraVENous, PRN  magnesium sulfate 2000 mg in 50 mL IVPB premix, 2,000 mg, IntraVENous, PRN  ondansetron (ZOFRAN-ODT) disintegrating tablet 4 mg, 4 mg, Oral, Q8H PRN **OR** ondansetron (ZOFRAN) injection 4 mg, 4 mg, IntraVENous, Q6H PRN  polyethylene glycol (GLYCOLAX) packet 17 g, 17 g, Oral, Daily PRN  acetaminophen (TYLENOL)

## 2024-03-26 ENCOUNTER — APPOINTMENT (OUTPATIENT)
Dept: INTERVENTIONAL RADIOLOGY/VASCULAR | Age: 79
DRG: 885 | End: 2024-03-26
Attending: STUDENT IN AN ORGANIZED HEALTH CARE EDUCATION/TRAINING PROGRAM
Payer: MEDICARE

## 2024-03-26 PROBLEM — R41.89 DISORGANIZED THOUGHT PROCESS: Status: RESOLVED | Noted: 2024-03-25 | Resolved: 2024-03-26

## 2024-03-26 PROBLEM — R44.3 HALLUCINATIONS: Status: RESOLVED | Noted: 2024-03-22 | Resolved: 2024-03-26

## 2024-03-26 PROBLEM — R44.1 VISUAL HALLUCINATIONS: Status: RESOLVED | Noted: 2024-03-22 | Resolved: 2024-03-26

## 2024-03-26 PROBLEM — R41.0 DISORIENTATION: Status: RESOLVED | Noted: 2024-03-25 | Resolved: 2024-03-26

## 2024-03-26 LAB
ALBUMIN SERPL-MCNC: 3.7 G/DL (ref 3.4–5)
ALBUMIN/GLOB SERPL: 1.8 {RATIO} (ref 1.1–2.2)
ALP SERPL-CCNC: 46 U/L (ref 40–129)
ALT SERPL-CCNC: 44 U/L (ref 10–40)
ANION GAP SERPL CALCULATED.3IONS-SCNC: 8 MMOL/L (ref 3–16)
AST SERPL-CCNC: 52 U/L (ref 15–37)
BASOPHILS # BLD: 0 K/UL (ref 0–0.2)
BASOPHILS NFR BLD: 0.3 %
BILIRUB SERPL-MCNC: 0.3 MG/DL (ref 0–1)
BUN SERPL-MCNC: 7 MG/DL (ref 7–20)
CALCIUM SERPL-MCNC: 8.8 MG/DL (ref 8.3–10.6)
CHLORIDE SERPL-SCNC: 109 MMOL/L (ref 99–110)
CO2 SERPL-SCNC: 26 MMOL/L (ref 21–32)
CREAT SERPL-MCNC: <0.5 MG/DL (ref 0.6–1.2)
DEPRECATED RDW RBC AUTO: 15 % (ref 12.4–15.4)
EOSINOPHIL # BLD: 0 K/UL (ref 0–0.6)
EOSINOPHIL NFR BLD: 0 %
GFR SERPLBLD CREATININE-BSD FMLA CKD-EPI: >90 ML/MIN/{1.73_M2}
GLUCOSE BLD-MCNC: 107 MG/DL (ref 70–99)
GLUCOSE SERPL-MCNC: 106 MG/DL (ref 70–99)
HCT VFR BLD AUTO: 36.1 % (ref 36–48)
HGB BLD-MCNC: 12.6 G/DL (ref 12–16)
HIV 1+2 AB+HIV1 P24 AG SERPL QL IA: NORMAL
HIV 2 AB SERPL QL IA: NORMAL
HIV1 AB SERPL QL IA: NORMAL
HIV1 P24 AG SERPL QL IA: NORMAL
LYMPHOCYTES # BLD: 1.9 K/UL (ref 1–5.1)
LYMPHOCYTES NFR BLD: 35.3 %
MCH RBC QN AUTO: 30.1 PG (ref 26–34)
MCHC RBC AUTO-ENTMCNC: 35 G/DL (ref 31–36)
MCV RBC AUTO: 85.9 FL (ref 80–100)
MONOCYTES # BLD: 0.8 K/UL (ref 0–1.3)
MONOCYTES NFR BLD: 13.9 %
NEUTROPHILS # BLD: 2.7 K/UL (ref 1.7–7.7)
NEUTROPHILS NFR BLD: 50.5 %
PERFORMED ON: ABNORMAL
PLATELET # BLD AUTO: 240 K/UL (ref 135–450)
PMV BLD AUTO: 9 FL (ref 5–10.5)
POTASSIUM SERPL-SCNC: 4 MMOL/L (ref 3.5–5.1)
PROT SERPL-MCNC: 5.8 G/DL (ref 6.4–8.2)
RBC # BLD AUTO: 4.2 M/UL (ref 4–5.2)
SMA IGG SER-ACNC: 2 UNITS (ref 0–19)
SODIUM SERPL-SCNC: 143 MMOL/L (ref 136–145)
WBC # BLD AUTO: 5.4 K/UL (ref 4–11)

## 2024-03-26 PROCEDURE — 85025 COMPLETE CBC W/AUTO DIFF WBC: CPT

## 2024-03-26 PROCEDURE — 80053 COMPREHEN METABOLIC PANEL: CPT

## 2024-03-26 PROCEDURE — 99232 SBSQ HOSP IP/OBS MODERATE 35: CPT | Performed by: REGISTERED NURSE

## 2024-03-26 PROCEDURE — 97530 THERAPEUTIC ACTIVITIES: CPT

## 2024-03-26 PROCEDURE — 6370000000 HC RX 637 (ALT 250 FOR IP): Performed by: STUDENT IN AN ORGANIZED HEALTH CARE EDUCATION/TRAINING PROGRAM

## 2024-03-26 PROCEDURE — 99233 SBSQ HOSP IP/OBS HIGH 50: CPT | Performed by: STUDENT IN AN ORGANIZED HEALTH CARE EDUCATION/TRAINING PROGRAM

## 2024-03-26 PROCEDURE — 6370000000 HC RX 637 (ALT 250 FOR IP): Performed by: INTERNAL MEDICINE

## 2024-03-26 PROCEDURE — 1200000000 HC SEMI PRIVATE

## 2024-03-26 PROCEDURE — 36415 COLL VENOUS BLD VENIPUNCTURE: CPT

## 2024-03-26 PROCEDURE — 97110 THERAPEUTIC EXERCISES: CPT

## 2024-03-26 PROCEDURE — 97535 SELF CARE MNGMENT TRAINING: CPT

## 2024-03-26 RX ADMIN — TRIAMCINOLONE ACETONIDE: 1 CREAM TOPICAL at 21:10

## 2024-03-26 RX ADMIN — Medication 6 MG: at 21:10

## 2024-03-26 RX ADMIN — PANTOPRAZOLE SODIUM 40 MG: 40 TABLET, DELAYED RELEASE ORAL at 06:12

## 2024-03-26 RX ADMIN — AZELASTINE HYDROCHLORIDE 2 SPRAY: 137 SPRAY, METERED NASAL at 21:10

## 2024-03-26 ASSESSMENT — PAIN SCALES - WONG BAKER
WONGBAKER_NUMERICALRESPONSE: NO HURT

## 2024-03-26 ASSESSMENT — PAIN SCALES - GENERAL: PAINLEVEL_OUTOF10: 0

## 2024-03-26 NOTE — CARE COORDINATION
Chart review done, nursing rounds completed.   PT/OT rec for home, and family does want to dc pt.   Pending neurology, and psychiatry.     Miguel Esteban LMSW, [facility] Social Work Case Management   Phone: [phone]  Fax: [phone]    [FreeTextEntry1] : I last saw him last in October.  He has a history of HTN.  Prior echo has shown impaired LV EF.  Cardiac CT angiogram in 2015 showed non-obstructive CAD; LV EF on that study was estimated to be 52%.  \par \par As he returns today, he remains well.  He continues his usual activities.  There have been no episodes of exertional chest discomfort or dyspnea. He describes no palpitations or lightheadedness. He reports no episodes of orthopnea or PND.\par \par There have been no new interval medical problems. Medications are unchanged.

## 2024-03-27 VITALS
HEART RATE: 86 BPM | BODY MASS INDEX: 19.43 KG/M2 | TEMPERATURE: 97.9 F | OXYGEN SATURATION: 99 % | DIASTOLIC BLOOD PRESSURE: 77 MMHG | WEIGHT: 106.26 LBS | SYSTOLIC BLOOD PRESSURE: 119 MMHG | RESPIRATION RATE: 15 BRPM

## 2024-03-27 LAB
A1AT SERPL-MCNC: 119 MG/DL (ref 90–200)
AFP-TM SERPL-MCNC: 2.6 UG/L
ALBUMIN SERPL-MCNC: 3.7 G/DL (ref 3.4–5)
ALBUMIN/GLOB SERPL: 1.6 {RATIO} (ref 1.1–2.2)
ALP SERPL-CCNC: 43 U/L (ref 40–129)
ALT SERPL-CCNC: 41 U/L (ref 10–40)
ANION GAP SERPL CALCULATED.3IONS-SCNC: 9 MMOL/L (ref 3–16)
AST SERPL-CCNC: 43 U/L (ref 15–37)
BASOPHILS # BLD: 0 K/UL (ref 0–0.2)
BASOPHILS NFR BLD: 0.1 %
BILIRUB SERPL-MCNC: 0.3 MG/DL (ref 0–1)
BUN SERPL-MCNC: 8 MG/DL (ref 7–20)
CALCIUM SERPL-MCNC: 8.8 MG/DL (ref 8.3–10.6)
CERULOPLASMIN SERPL-MCNC: 25 MG/DL (ref 16–45)
CHLORIDE SERPL-SCNC: 101 MMOL/L (ref 99–110)
CO2 SERPL-SCNC: 26 MMOL/L (ref 21–32)
CREAT SERPL-MCNC: <0.5 MG/DL (ref 0.6–1.2)
DEPRECATED RDW RBC AUTO: 15.3 % (ref 12.4–15.4)
EOSINOPHIL # BLD: 0 K/UL (ref 0–0.6)
EOSINOPHIL NFR BLD: 0 %
GFR SERPLBLD CREATININE-BSD FMLA CKD-EPI: >90 ML/MIN/{1.73_M2}
GLUCOSE SERPL-MCNC: 159 MG/DL (ref 70–99)
HCT VFR BLD AUTO: 35.1 % (ref 36–48)
HGB BLD-MCNC: 12 G/DL (ref 12–16)
LYMPHOCYTES # BLD: 1.3 K/UL (ref 1–5.1)
LYMPHOCYTES NFR BLD: 29.7 %
MCH RBC QN AUTO: 29.8 PG (ref 26–34)
MCHC RBC AUTO-ENTMCNC: 34.1 G/DL (ref 31–36)
MCV RBC AUTO: 87.4 FL (ref 80–100)
MONOCYTES # BLD: 0.5 K/UL (ref 0–1.3)
MONOCYTES NFR BLD: 11.6 %
NEUTROPHILS # BLD: 2.6 K/UL (ref 1.7–7.7)
NEUTROPHILS NFR BLD: 58.6 %
PLATELET # BLD AUTO: 231 K/UL (ref 135–450)
PMV BLD AUTO: 9.5 FL (ref 5–10.5)
POTASSIUM SERPL-SCNC: 4.1 MMOL/L (ref 3.5–5.1)
PROT SERPL-MCNC: 6 G/DL (ref 6.4–8.2)
RBC # BLD AUTO: 4.01 M/UL (ref 4–5.2)
SODIUM SERPL-SCNC: 136 MMOL/L (ref 136–145)
VIT B1 SERPL-MCNC: 8 NMOL/L (ref 4–15)
WBC # BLD AUTO: 4.4 K/UL (ref 4–11)

## 2024-03-27 PROCEDURE — 80053 COMPREHEN METABOLIC PANEL: CPT

## 2024-03-27 PROCEDURE — 94760 N-INVAS EAR/PLS OXIMETRY 1: CPT

## 2024-03-27 PROCEDURE — 36415 COLL VENOUS BLD VENIPUNCTURE: CPT

## 2024-03-27 PROCEDURE — 85025 COMPLETE CBC W/AUTO DIFF WBC: CPT

## 2024-03-27 PROCEDURE — 99239 HOSP IP/OBS DSCHRG MGMT >30: CPT | Performed by: INTERNAL MEDICINE

## 2024-03-27 ASSESSMENT — PAIN SCALES - WONG BAKER
WONGBAKER_NUMERICALRESPONSE: NO HURT

## 2024-03-27 NOTE — PLAN OF CARE
Problem: ABCDS Injury Assessment  Goal: Absence of physical injury  3/27/2024 0912 by Ana Medel, RN  Outcome: Progressing  3/27/2024 0030 by Betty Pablo, RN  Outcome: Progressing     
  Problem: Discharge Planning  Goal: Discharge to home or other facility with appropriate resources  3/23/2024 1058 by Nicol Ewing RN  Outcome: Progressing  3/23/2024 0042 by Morirs Tai RN  Outcome: Progressing  Flowsheets (Taken 3/22/2024 1936)  Discharge to home or other facility with appropriate resources:   Identify barriers to discharge with patient and caregiver   Arrange for needed discharge resources and transportation as appropriate   Identify discharge learning needs (meds, wound care, etc)     Problem: ABCDS Injury Assessment  Goal: Absence of physical injury  3/23/2024 1058 by Nicol Ewing RN  Outcome: Progressing  3/23/2024 0042 by Morris Tai RN  Outcome: Progressing     Problem: Safety - Adult  Goal: Free from fall injury  3/23/2024 1058 by Nicol Ewing RN  Outcome: Progressing  3/23/2024 0042 by Morris Tai RN  Outcome: Progressing     Problem: Confusion  Goal: Confusion, delirium, dementia, or psychosis is improved or at baseline  Description: INTERVENTIONS:  1. Assess for possible contributors to thought disturbance, including medications, impaired vision or hearing, underlying metabolic abnormalities, dehydration, psychiatric diagnoses, and notify attending LIP  2. [place] high risk fall precautions, as indicated  3. Provide frequent short contacts to provide reality reorientation, refocusing and direction  4. Decrease environmental stimuli, including noise as appropriate  5. Monitor and intervene to maintain adequate nutrition, hydration, elimination, sleep and activity  6. If unable to ensure safety without constant attention obtain sitter and review sitter guidelines with assigned personnel  7. Initiate Psychosocial CNS and Spiritual Care consult, as indicated  Outcome: Progressing     
  Problem: Discharge Planning  Goal: Discharge to home or other facility with appropriate resources  3/24/2024 1022 by William Stubbs RN  Outcome: Progressing  Flowsheets  Taken 3/24/2024 1022  Discharge to home or other facility with appropriate resources:   Identify barriers to discharge with patient and caregiver   Arrange for needed discharge resources and transportation as appropriate   Identify discharge learning needs (meds, wound care, etc)   Arrange for interpreters to assist at discharge as needed  Taken 3/24/2024 1009  Discharge to home or other facility with appropriate resources: Identify barriers to discharge with patient and caregiver  3/24/2024 0140 by Chas Casey RN  Outcome: Progressing     Problem: ABCDS Injury Assessment  Goal: Absence of physical injury  3/24/2024 1022 by William Stubbs RN  Outcome: Progressing  Flowsheets  Taken 3/24/2024 1022  Absence of Physical Injury: Implement safety measures based on patient assessment  Taken 3/24/2024 1020  Absence of Physical Injury: Implement safety measures based on patient assessment  3/24/2024 0140 by Chas Casey RN  Outcome: Progressing     Problem: Confusion  Goal: Confusion, delirium, dementia, or psychosis is improved or at baseline  Description: INTERVENTIONS:  1. Assess for possible contributors to thought disturbance, including medications, impaired vision or hearing, underlying metabolic abnormalities, dehydration, psychiatric diagnoses, and notify attending LIP  2. [place] high risk fall precautions, as indicated  3. Provide frequent short contacts to provide reality reorientation, refocusing and direction  4. Decrease environmental stimuli, including noise as appropriate  5. Monitor and intervene to maintain adequate nutrition, hydration, elimination, sleep and activity  6. If unable to ensure safety without constant attention obtain sitter and review sitter guidelines with assigned personnel  7. Initiate 
  Problem: Discharge Planning  Goal: Discharge to home or other facility with appropriate resources  3/26/2024 0139 by Betty Pablo RN  Outcome: Progressing  3/25/2024 1551 by Uma Weaver RN  Outcome: Progressing  Flowsheets (Taken 3/25/2024 0810)  Discharge to home or other facility with appropriate resources:   Identify barriers to discharge with patient and caregiver   Arrange for needed discharge resources and transportation as appropriate   Identify discharge learning needs (meds, wound care, etc)     Problem: ABCDS Injury Assessment  Goal: Absence of physical injury  3/26/2024 0139 by Betyt Pablo RN  Outcome: Progressing  3/25/2024 1551 by Uma Weaver RN  Outcome: Progressing  Flowsheets (Taken 3/25/2024 0810)  Absence of Physical Injury: Implement safety measures based on patient assessment     Problem: Safety - Adult  Goal: Free from fall injury  3/26/2024 0139 by Betty Pablo RN  Outcome: Progressing  3/25/2024 1551 by Uma Weaver RN  Outcome: Progressing  Flowsheets (Taken 3/25/2024 0810)  Free From Fall Injury: Instruct family/caregiver on patient safety     Problem: Confusion  Goal: Confusion, delirium, dementia, or psychosis is improved or at baseline  Description: INTERVENTIONS:  1. Assess for possible contributors to thought disturbance, including medications, impaired vision or hearing, underlying metabolic abnormalities, dehydration, psychiatric diagnoses, and notify attending LIP  2. [place] high risk fall precautions, as indicated  3. Provide frequent short contacts to provide reality reorientation, refocusing and direction  4. Decrease environmental stimuli, including noise as appropriate  5. Monitor and intervene to maintain adequate nutrition, hydration, elimination, sleep and activity  6. If unable to ensure safety without constant attention obtain sitter and review sitter guidelines with assigned personnel  7. Initiate Psychosocial CNS and Spiritual Care consult, as 
  Problem: Discharge Planning  Goal: Discharge to home or other facility with appropriate resources  3/26/2024 1232 by Uma Weaver RN  Outcome: Progressing  Flowsheets (Taken 3/26/2024 0834)  Discharge to home or other facility with appropriate resources:   Identify barriers to discharge with patient and caregiver   Arrange for needed discharge resources and transportation as appropriate   Identify discharge learning needs (meds, wound care, etc)     Problem: ABCDS Injury Assessment  Goal: Absence of physical injury  3/26/2024 1232 by Uma Weaver RN  Outcome: Progressing  Flowsheets (Taken 3/26/2024 0834)  Absence of Physical Injury: Implement safety measures based on patient assessment     Problem: Safety - Adult  Goal: Free from fall injury  3/26/2024 1232 by Uma Weaver RN  Outcome: Progressing  Flowsheets (Taken 3/26/2024 0834)  Free From Fall Injury: Instruct family/caregiver on patient safety     Problem: Confusion  Goal: Confusion, delirium, dementia, or psychosis is improved or at baseline  Description: INTERVENTIONS:  1. Assess for possible contributors to thought disturbance, including medications, impaired vision or hearing, underlying metabolic abnormalities, dehydration, psychiatric diagnoses, and notify attending LIP  2. [place] high risk fall precautions, as indicated  3. Provide frequent short contacts to provide reality reorientation, refocusing and direction  4. Decrease environmental stimuli, including noise as appropriate  5. Monitor and intervene to maintain adequate nutrition, hydration, elimination, sleep and activity  6. If unable to ensure safety without constant attention obtain sitter and review sitter guidelines with assigned personnel  7. Initiate Psychosocial CNS and Spiritual Care consult, as indicated  3/26/2024 1232 by Uma Weaver RN  Outcome: Progressing  Flowsheets (Taken 3/26/2024 0834)  Effect of thought disturbance (confusion, delirium, 
  Problem: Discharge Planning  Goal: Discharge to home or other facility with appropriate resources  3/27/2024 0030 by Betty Pablo RN  Outcome: Progressing  3/26/2024 1232 by Uma Weaver RN  Outcome: Progressing  Flowsheets (Taken 3/26/2024 0834)  Discharge to home or other facility with appropriate resources:   Identify barriers to discharge with patient and caregiver   Arrange for needed discharge resources and transportation as appropriate   Identify discharge learning needs (meds, wound care, etc)     Problem: ABCDS Injury Assessment  Goal: Absence of physical injury  3/27/2024 0030 by Betty Pablo RN  Outcome: Progressing  3/26/2024 1232 by Uma Weaver RN  Outcome: Progressing  Flowsheets (Taken 3/26/2024 0834)  Absence of Physical Injury: Implement safety measures based on patient assessment     Problem: Safety - Adult  Goal: Free from fall injury  3/27/2024 0030 by Betty Pablo RN  Outcome: Progressing  3/26/2024 1232 by Uma Weaver RN  Outcome: Progressing  Flowsheets (Taken 3/26/2024 0834)  Free From Fall Injury: Instruct family/caregiver on patient safety     Problem: Confusion  Goal: Confusion, delirium, dementia, or psychosis is improved or at baseline  Description: INTERVENTIONS:  1. Assess for possible contributors to thought disturbance, including medications, impaired vision or hearing, underlying metabolic abnormalities, dehydration, psychiatric diagnoses, and notify attending LIP  2. [place] high risk fall precautions, as indicated  3. Provide frequent short contacts to provide reality reorientation, refocusing and direction  4. Decrease environmental stimuli, including noise as appropriate  5. Monitor and intervene to maintain adequate nutrition, hydration, elimination, sleep and activity  6. If unable to ensure safety without constant attention obtain sitter and review sitter guidelines with assigned personnel  7. Initiate Psychosocial CNS and Spiritual Care consult, as 
  Problem: Discharge Planning  Goal: Discharge to home or other facility with appropriate resources  Outcome: Progressing     Problem: ABCDS Injury Assessment  Goal: Absence of physical injury  Outcome: Progressing     Problem: Safety - Adult  Goal: Free from fall injury  Outcome: Progressing     Problem: Confusion  Goal: Confusion, delirium, dementia, or psychosis is improved or at baseline  Description: INTERVENTIONS:  1. Assess for possible contributors to thought disturbance, including medications, impaired vision or hearing, underlying metabolic abnormalities, dehydration, psychiatric diagnoses, and notify attending LIP  2. [place] high risk fall precautions, as indicated  3. Provide frequent short contacts to provide reality reorientation, refocusing and direction  4. Decrease environmental stimuli, including noise as appropriate  5. Monitor and intervene to maintain adequate nutrition, hydration, elimination, sleep and activity  6. If unable to ensure safety without constant attention obtain sitter and review sitter guidelines with assigned personnel  7. Initiate Psychosocial CNS and Spiritual Care consult, as indicated  Outcome: Progressing     Problem: Pain  Goal: Verbalizes/displays adequate comfort level or baseline comfort level  Outcome: Progressing     
  Problem: Discharge Planning  Goal: Discharge to home or other facility with appropriate resources  Outcome: Progressing     Problem: ABCDS Injury Assessment  Goal: Absence of physical injury  Outcome: Progressing     Problem: Safety - Adult  Goal: Free from fall injury  Outcome: Progressing     Problem: Confusion  Goal: Confusion, delirium, dementia, or psychosis is improved or at baseline  Description: INTERVENTIONS:  1. Assess for possible contributors to thought disturbance, including medications, impaired vision or hearing, underlying metabolic abnormalities, dehydration, psychiatric diagnoses, and notify attending LIP  2. [place] high risk fall precautions, as indicated  3. Provide frequent short contacts to provide reality reorientation, refocusing and direction  4. Decrease environmental stimuli, including noise as appropriate  5. Monitor and intervene to maintain adequate nutrition, hydration, elimination, sleep and activity  6. If unable to ensure safety without constant attention obtain sitter and review sitter guidelines with assigned personnel  7. Initiate Psychosocial CNS and Spiritual Care consult, as indicated  Outcome: Progressing     
  Problem: Discharge Planning  Goal: Discharge to home or other facility with appropriate resources  Outcome: Progressing  Flowsheets (Taken 3/22/2024 1936)  Discharge to home or other facility with appropriate resources:   Identify barriers to discharge with patient and caregiver   Arrange for needed discharge resources and transportation as appropriate   Identify discharge learning needs (meds, wound care, etc)     Problem: ABCDS Injury Assessment  Goal: Absence of physical injury  Outcome: Progressing     Problem: Safety - Adult  Goal: Free from fall injury  Outcome: Progressing     
Pt. Refused morning vitals. PCA's were told to get out of her room and do not take her vitals because she was eating. RN aware.   
Pts.  refused morning vitals on behalf of the patient and would not allow PCAs to enter the room. RN aware.  
are monitored and maintained or improved  Outcome: Progressing

## 2024-03-27 NOTE — DISCHARGE INSTRUCTIONS
Go back to iron once a week and discuss further with Dr Vicente on follow up  Iron studies did not show iron deficiency

## 2024-03-28 ENCOUNTER — CARE COORDINATION (OUTPATIENT)
Dept: CASE MANAGEMENT | Age: 79
End: 2024-03-28

## 2024-03-28 NOTE — CARE COORDINATION
Care Transitions Initial Follow Up Call    Call within 2 business days of discharge: Yes    Patient Current Location:  [place]    Care Transition Nurse contacted the patient by telephone to perform post hospital discharge assessment.  Provided introduction to self, and explanation of the Care Transition Nurse role.     Patient: Gamaliel Carcamo Patient : 1945   MRN: [id number]  Reason for Admission: Visual Hallucinations  Discharge Date: 3/27/24 RARS: Readmission Risk Score: 8.5      Last Discharge Facility       Date Complaint Diagnosis Description Type Department Provider    3/22/24   Admission (Discharged) Michael Plaza MD            Was this an external facility discharge? No Discharge Facility: [facility]    Challenges to be reviewed by the provider   Additional needs identified to be addressed with provider: No                 Method of communication with provider: none.    Initial attempt at CT discharge phone call. Gamaliel states she is \"fine.\" She denies any issues. She declines completing the CT discharge phone call or any additional CT outreach.        Care Transitions 24 Hour Call    Care Transitions Interventions           Follow Up  No future appointments.     Krystin Del Angel RN BSN  Care Transition Nurse  [phone]

## 2024-03-28 NOTE — PROGRESS NOTES
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 Spouse  Type of Home: House  Home Layout: Two level, Bed/Bath upstairs, Able to Live on Main level with bedroom/bathroom, 1/2 bath on main level (pt typically on 1st floor with her bed and bath; basement with FOS--pt's craft room there.)  Home Access: Stairs to enter with rails  Entrance Stairs - Number of Steps: 5 promise  Bathroom Shower/Tub: Tub/Shower unit  Bathroom Toilet: Standard  ADL Assistance: Independent  Homemaking Assistance: Independent  Ambulation Assistance: Independent  Transfer Assistance: Independent  Leisure & Hobbies: Sewing  Additional Comments:  reports she was independent for all home management.  However he reports has \"torn up 3 floors of the house\" and that she doesn't sleep more than 15 minutes at a time, then stays awake for 18 hours.       Objective   Safety Devices  Type of Devices: Call light within reach;Nurse notified;Left in bed (family assisting pt in the room)           ADL  Grooming: Stand by assistance  Grooming Skilled Clinical Factors: Pt performed oral care in stance at the sink  Functional Mobility: Supervision  Functional Mobility Skilled Clinical Factors: Pt completed functional mobility ~150 ft in hallway + to/from bathroom supervision while holding OT's hand. No LOB. Family has been assisting 
by acute d/c:  Short Term Goal 1: transfers Ind  Short Term Goal 2: amb > 150 ft with AD only if needed and S/Ind  Short Term Goal 3: assess steps as needed for d/c plans.  Patient Goals   Patient Goals : pt did not state a therapy goal at this time.     Education  Patient Education  Education Given To: Patient;Family  Education Provided: Role of Therapy;Plan of Care  Education Method: Verbal  Barriers to Learning: Cognition  Education Outcome: Verbalized understanding;Continued education needed    Therapy Time   Individual Concurrent Group Co-treatment   Time In 1200         Time Out 1245         Minutes 45          1eval, 1 gt, 1 act charges   Inna Thomason, PT Electronically signed by Inna Thomason, PT on 3/23/2024 at 1:04 PM           
poor and significant changes to sleep cycle for the last 3 weeks including changes to time spent and pattern of sleep.     Recommendations  - Awaiting routine EEG report.   - Agree with Psych evaluation.   - HIV screen (ordered) Remaining labs per primary team which include Vitamin B1. May consider paraneoplastic/autoimmune serum panel pending the above.   - LP and CSF studies per primary team. Will follow for preliminary study results and consider additional neuro specific studies pending results.   - Delirium protocol as safely able. Agree with efforts and discussed with patient daughter to help promote good sleep/wake cycles.       Keely Munz, CNP  [facility]    I spent 41  minutes in face to face encounter with patient,  care collaboration with multidisciplinary team, review of chart and pertinent testing, coordination of care, medical decision making and documentation.        A copy of this note was provided for Michael Garzon MD     
stand: Supervision  Stand to sit: Supervision  Vision  Vision: Impaired  Vision Exceptions: Wears glasses for reading;Wears glasses for distance  Hearing  Hearing: Within functional limits  Cognition  Overall Cognitive Status: Exceptions  Following Commands: Follows all commands without difficulty  Problem Solving: Assistance required to implement solutions  Insights: Decreased awareness of deficits (Pt can discuss and is aware of past visual hallucinations but is not aware when making paranoid and delusional comments that they are not appropriate or real.)  Orientation  Overall Orientation Status: Within Functional Limits (knew date/month/year/president---however, with conversation, pt with paranoid/dillusional conversations.)  Orientation Level: Oriented to place;Oriented to time;Oriented to person                  Education Given To: Patient;Family  Education Provided: Role of Therapy;Plan of Care;Transfer Training;IADL Safety  Education Method: Demonstration;Verbal  Barriers to Learning: Cognition  Education Outcome: Verbalized understanding;Continued education needed        L Fingers  Full Assessment:  (Not tested formally but pt unable to open water bottle lid and reports this is typical.)  R Fingers  Full Assessment:  (Not tested formally but pt unable to open water bottle lid and reports this is typical.)        AM-PAC - ADL  AM-PAC Daily Activity - Inpatient   How much help is needed for putting on and taking off regular lower body clothing?: A Little  How much help is needed for bathing (which includes washing, rinsing, drying)?: A Little  How much help is needed for toileting (which includes using toilet, bedpan, or urinal)?: A Little  How much help is needed for putting on and taking off regular upper body clothing?: A Little  How much help is needed for taking care of personal grooming?: None  How much help for eating meals?: None  AM-[facility] Inpatient Daily Activity Raw Score: 20  AM-PAC Inpatient ADL

## 2024-03-28 NOTE — TELEPHONE ENCOUNTER
Patient scheduled for 4.4.24 in same day slots. Mr. Carcamo answered and did not want to schedule at first. Stated they would be here Friday. Patient had no sleep in hospital and neither did he. They are both trying to sleep so they will just be here.    I explained needed that appt - so he scheduled with me.

## 2024-03-29 LAB — MITOCHONDRIA M2 AB SER IA-ACNC: 0.7 U/ML (ref 0–4)

## 2024-06-20 ENCOUNTER — OFFICE VISIT (OUTPATIENT)
Dept: ORTHOPEDIC SURGERY | Age: 79
End: 2024-06-20

## 2024-06-20 VITALS — BODY MASS INDEX: 20.06 KG/M2 | WEIGHT: 109 LBS | HEIGHT: 62 IN

## 2024-06-20 DIAGNOSIS — M25.511 ACUTE PAIN OF RIGHT SHOULDER: Primary | ICD-10-CM

## 2024-06-20 DIAGNOSIS — M19.011 ARTHRITIS OF RIGHT SHOULDER REGION: ICD-10-CM

## 2024-06-20 RX ORDER — BACLOFEN 5 MG/1
5 TABLET ORAL
COMMUNITY
Start: 2024-06-13

## 2024-06-20 RX ORDER — LANOLIN ALCOHOL/MO/W.PET/CERES
400 CREAM (GRAM) TOPICAL DAILY
COMMUNITY

## 2024-06-20 RX ORDER — METHYLPREDNISOLONE ACETATE 40 MG/ML
80 INJECTION, SUSPENSION INTRA-ARTICULAR; INTRALESIONAL; INTRAMUSCULAR; SOFT TISSUE ONCE
Status: COMPLETED | OUTPATIENT
Start: 2024-06-20 | End: 2024-06-20

## 2024-06-20 RX ORDER — METHOTREXATE 2.5 MG/1
TABLET ORAL
COMMUNITY

## 2024-06-20 RX ORDER — FERROUS SULFATE 325(65) MG
325 TABLET ORAL
COMMUNITY

## 2024-06-20 RX ADMIN — Medication 4 ML: at 14:12

## 2024-06-20 RX ADMIN — METHYLPREDNISOLONE ACETATE 80 MG: 40 INJECTION, SUSPENSION INTRA-ARTICULAR; INTRALESIONAL; INTRAMUSCULAR; SOFT TISSUE at 14:12

## 2024-06-20 NOTE — PROGRESS NOTES
Yvonne Carcamo is seen today for her right shoulder.  I have seen her left shoulder in the past.  She has had pain since February or March of this year.  She has been able to manage it typically with a lidocaine patch and ibuprofen but recently its gotten worse.  She did some repetitive overhead lifting yesterday cleaning out a closet and that cause pain that can be as bad as 6 out of 10.  She does not recall trauma.    She is right-hand dominant.    She has high cholesterol and high blood pressure.      History: Patient's relevant past family, medical, and social history are reviewed as part of today's visit. ROS of pertinent positives and negatives as above; otherwise negative.    General Exam:    Vitals: Height 1.575 m (5' 2\"), weight 49.4 kg (109 lb), not currently breastfeeding.  Constitutional: Patient is adequately groomed with no evidence of malnutrition  Mental Status: The patient is oriented to time, place and person.  The patient's mood and affect are appropriate.  Gait:  Patient walks with normal gait and station.  Lymphatic: The lymphatic examination bilaterally reveals all areas to be without enlargement or induration.  Vascular: Examination reveals no swelling or calf tenderness.  Peripheral pulses are palpable and 2+.  Neurological: The patient has good coordination.  There is no weakness or sensory deficit.    Skin:    Head/Neck: inspection reveals no rashes, ulcerations or lesions.  Trunk:  inspection reveals no rashes, ulcerations or lesions.  Right Lower Extremity: inspection reveals no rashes, ulcerations or lesions.  Left Lower Extremity: inspection reveals no rashes, ulcerations or lesions.    Examination of the cervical spine reveals no restriction in motion.  There are no reproduction of symptoms into either arm with flexion, extension, rotation or palpation.  The patient has a negative Spurling sign, and no tenderness.    Examination of the left shoulder reveals normal scapular control

## 2024-07-11 ENCOUNTER — HOSPITAL ENCOUNTER (OUTPATIENT)
Age: 79
Discharge: HOME | End: 2024-07-13
Attending: STUDENT IN AN ORGANIZED HEALTH CARE EDUCATION/TRAINING PROGRAM
Payer: MEDICARE

## 2024-07-11 DIAGNOSIS — I34.0 MODERATE MITRAL REGURGITATION: ICD-10-CM

## 2024-07-11 LAB
ECHO AO ROOT DIAM: 3.1 CM
ECHO AV AREA PEAK VELOCITY: 0.6 CM2
ECHO AV AREA VTI: 0.6 CM2
ECHO AV MEAN GRADIENT: 48 MMHG
ECHO AV MEAN VELOCITY: 3.3 M/S
ECHO AV PEAK GRADIENT: 88 MMHG
ECHO AV PEAK VELOCITY: 4.7 M/S
ECHO AV VTI: 94.7 CM
ECHO LA AREA 2C: 13.5 CM2
ECHO LA AREA 4C: 11.8 CM2
ECHO LA DIAMETER: 2.6 CM
ECHO LA MAJOR AXIS: 4.2 CM
ECHO LA MINOR AXIS: 4.3 CM
ECHO LA TO AORTIC ROOT RATIO: 0.84
ECHO LA VOL BP: 31 ML (ref 22–52)
ECHO LA VOL MOD A2C: 35 ML (ref 22–52)
ECHO LA VOL MOD A4C: 28 ML (ref 22–52)
ECHO LV E' LATERAL VELOCITY: 8 CM/S
ECHO LV E' SEPTAL VELOCITY: 7 CM/S
ECHO LV FRACTIONAL SHORTENING: 56 % (ref 28–44)
ECHO LV INTERNAL DIMENSION DIASTOLIC: 3.6 CM (ref 3.9–5.3)
ECHO LV INTERNAL DIMENSION SYSTOLIC: 1.6 CM
ECHO LV IVSD: 1.3 CM (ref 0.6–0.9)
ECHO LV MASS 2D: 150.6 G (ref 67–162)
ECHO LV POSTERIOR WALL DIASTOLIC: 1.2 CM (ref 0.6–0.9)
ECHO LV RELATIVE WALL THICKNESS RATIO: 0.67
ECHO LVOT AREA: 2.3 CM2
ECHO LVOT AV VTI INDEX: 0.26
ECHO LVOT DIAM: 1.7 CM
ECHO LVOT MEAN GRADIENT: 4 MMHG
ECHO LVOT PEAK GRADIENT: 6 MMHG
ECHO LVOT PEAK VELOCITY: 1.3 M/S
ECHO LVOT PEAK VELOCITY: 1.3 M/S
ECHO LVOT SV: 55.4 ML
ECHO LVOT VTI: 24.4 CM
ECHO MV A VELOCITY: 1.23 M/S
ECHO MV E DECELERATION TIME (DT): 231 MS
ECHO MV E VELOCITY: 0.82 M/S
ECHO MV E/A RATIO: 0.67
ECHO MV E/E' LATERAL: 10.25
ECHO MV E/E' RATIO (AVERAGED): 10.98
ECHO MV E/E' SEPTAL: 11.71
ECHO PV MAX VELOCITY: 1.3 M/S
ECHO PV PEAK GRADIENT: 7 MMHG
ECHO RA AREA 4C: 7.3 CM2
ECHO RA VOLUME: 12 ML
ECHO RV FREE WALL PEAK S': 14 CM/S
ECHO RV INTERNAL DIMENSION: 2.6 CM
ECHO RV TAPSE: 1.7 CM (ref 1.7–?)
ECHO TV REGURGITANT MAX VELOCITY: 2.17 M/S
ECHO TV REGURGITANT PEAK GRADIENT: 19 MMHG

## 2024-07-11 PROCEDURE — 93306 TTE W/DOPPLER COMPLETE: CPT
